# Patient Record
Sex: FEMALE | Race: WHITE | NOT HISPANIC OR LATINO | Employment: OTHER | URBAN - METROPOLITAN AREA
[De-identification: names, ages, dates, MRNs, and addresses within clinical notes are randomized per-mention and may not be internally consistent; named-entity substitution may affect disease eponyms.]

---

## 2019-09-13 ENCOUNTER — HOSPITAL ENCOUNTER (EMERGENCY)
Facility: HOSPITAL | Age: 74
Discharge: HOME | End: 2019-09-13
Attending: EMERGENCY MEDICINE
Payer: COMMERCIAL

## 2019-09-13 VITALS
RESPIRATION RATE: 15 BRPM | HEIGHT: 63 IN | SYSTOLIC BLOOD PRESSURE: 173 MMHG | TEMPERATURE: 98.1 F | HEART RATE: 92 BPM | BODY MASS INDEX: 26.56 KG/M2 | OXYGEN SATURATION: 95 % | DIASTOLIC BLOOD PRESSURE: 83 MMHG | WEIGHT: 149.91 LBS

## 2019-09-13 DIAGNOSIS — M79.604 RIGHT LEG PAIN: ICD-10-CM

## 2019-09-13 DIAGNOSIS — L97.309: ICD-10-CM

## 2019-09-13 DIAGNOSIS — T14.8XXA WOUND INFECTION: ICD-10-CM

## 2019-09-13 DIAGNOSIS — R26.2 AMBULATORY DYSFUNCTION: Primary | ICD-10-CM

## 2019-09-13 DIAGNOSIS — L08.9 WOUND INFECTION: ICD-10-CM

## 2019-09-13 PROCEDURE — 99283 EMERGENCY DEPT VISIT LOW MDM: CPT

## 2019-09-13 PROCEDURE — 99284 EMERGENCY DEPT VISIT MOD MDM: CPT | Performed by: EMERGENCY MEDICINE

## 2019-09-13 RX ORDER — CEPHALEXIN 500 MG/1
500 CAPSULE ORAL EVERY 6 HOURS SCHEDULED
Qty: 28 CAPSULE | Refills: 0 | Status: SHIPPED | OUTPATIENT
Start: 2019-09-13 | End: 2019-09-13 | Stop reason: SDUPTHER

## 2019-09-13 RX ORDER — SULFAMETHOXAZOLE AND TRIMETHOPRIM 800; 160 MG/1; MG/1
1 TABLET ORAL 2 TIMES DAILY
Qty: 14 TABLET | Refills: 0 | Status: SHIPPED | OUTPATIENT
Start: 2019-09-13 | End: 2019-09-20

## 2019-09-13 RX ORDER — TRAMADOL HYDROCHLORIDE 50 MG/1
25 TABLET ORAL EVERY 6 HOURS PRN
Qty: 12 TABLET | Refills: 0 | Status: SHIPPED | OUTPATIENT
Start: 2019-09-13 | End: 2019-09-20

## 2019-09-13 RX ORDER — SULFAMETHOXAZOLE AND TRIMETHOPRIM 800; 160 MG/1; MG/1
1 TABLET ORAL ONCE
Status: COMPLETED | OUTPATIENT
Start: 2019-09-13 | End: 2019-09-13

## 2019-09-13 RX ORDER — TRAMADOL HYDROCHLORIDE 50 MG/1
25 TABLET ORAL EVERY 6 HOURS PRN
Qty: 12 TABLET | Refills: 0 | Status: SHIPPED | OUTPATIENT
Start: 2019-09-13 | End: 2019-09-13 | Stop reason: SDUPTHER

## 2019-09-13 RX ORDER — SULFAMETHOXAZOLE AND TRIMETHOPRIM 800; 160 MG/1; MG/1
1 TABLET ORAL 2 TIMES DAILY
Qty: 14 TABLET | Refills: 0 | Status: SHIPPED | OUTPATIENT
Start: 2019-09-13 | End: 2019-09-13 | Stop reason: SDUPTHER

## 2019-09-13 RX ORDER — CEPHALEXIN 250 MG/1
500 CAPSULE ORAL ONCE
Status: COMPLETED | OUTPATIENT
Start: 2019-09-13 | End: 2019-09-13

## 2019-09-13 RX ORDER — CEPHALEXIN 500 MG/1
500 CAPSULE ORAL EVERY 6 HOURS SCHEDULED
Qty: 28 CAPSULE | Refills: 0 | Status: SHIPPED | OUTPATIENT
Start: 2019-09-13 | End: 2019-09-20

## 2019-09-13 RX ADMIN — CEPHALEXIN 500 MG: 250 CAPSULE ORAL at 11:08

## 2019-09-13 RX ADMIN — SULFAMETHOXAZOLE AND TRIMETHOPRIM 1 TABLET: 800; 160 TABLET ORAL at 11:08

## 2019-09-13 NOTE — ED PROVIDER NOTES
History  Chief Complaint   Patient presents with    Wound Infection     pt c/o wound on her right calf for the past year "on and off"  ulcer present  area is pink and warm to touch     HPI   76-year-old female with history of HLD, polyneuropathy, HTN, osteoporosis and chronic right lower extremity ulcer presents to the ED for evaluation of chronic right lower leg ulcer  Patient states that she had right knee pain last night that she believes was due to the position she was laying in  She told her daughter about pain and was urged to come to the ED for evaluation of knee pain and of chronic ulcer  Ulcer has reportedly been present for over a year  Patient has not noted any recent changes in the ulcer including drainage, increase in erythema, or increase in pain and states that she would not have come to the ED if not for knee pain, which is actually not present on evaluation  Pain in/around the ulcer is also not present on exam, though patient does report that sometimes it throbs at night  She does not recall the last time she was on oral antibiotics for the ulcer  She does apply OTC antibiotic ointment  She has not been seen by a doctor in some time  She has had difficulty walking over the past year, but has been getting around with a walker  Patient offered possible admission today for treatment/further evaluation of ambulatory dysfunction and wound care, which she declines  She states she feels well  ROS negative for fevers, chills, chest pain, shortness of breath, abdominal pain, nausea, vomiting, weakness, numbness, paresthesias, or complaints other than stated above  None       Past Medical History:   Diagnosis Date    Osteoporosis     Shingles        Past Surgical History:   Procedure Laterality Date    CATARACT EXTRACTION Bilateral     FOOT SURGERY Right     reconstruction    TOTAL HIP ARTHROPLASTY Bilateral        History reviewed  No pertinent family history    I have reviewed and agree with the history as documented  Social History     Tobacco Use    Smoking status: Former Smoker    Smokeless tobacco: Never Used    Tobacco comment: quit 50 years ago   Substance Use Topics    Alcohol use: Yes     Alcohol/week: 7 0 standard drinks     Types: 7 Glasses of wine per week    Drug use: Never        Review of Systems   Musculoskeletal: Positive for gait problem  Skin: Positive for wound  All other systems reviewed and are negative  Physical Exam  Physical Exam   Constitutional: She is oriented to person, place, and time  She appears well-nourished  No distress  HENT:   Head: Normocephalic and atraumatic  Eyes: EOM are normal    Neck: Normal range of motion  Neck supple  Cardiovascular: Normal rate and regular rhythm  Pulmonary/Chest: Effort normal and breath sounds normal  No respiratory distress  Abdominal: Soft  She exhibits no distension  There is no tenderness  Musculoskeletal: Normal range of motion  She exhibits deformity (chronic deformities of hands, left knee)  Neurological: She is alert and oriented to person, place, and time  Skin: Skin is warm and dry  She is not diaphoretic  Psychiatric: She has a normal mood and affect  Her behavior is normal    Nursing note and vitals reviewed            Vital Signs  ED Triage Vitals [09/13/19 1036]   Temperature Pulse Respirations Blood Pressure SpO2   98 1 °F (36 7 °C) 83 16 168/97 97 %      Temp Source Heart Rate Source Patient Position - Orthostatic VS BP Location FiO2 (%)   Oral Monitor Sitting Right arm --      Pain Score       No Pain           Vitals:    09/13/19 1036 09/13/19 1240   BP: 168/97 (!) 173/83   Pulse: 83 92   Patient Position - Orthostatic VS: Sitting Sitting         Visual Acuity      ED Medications  Medications   cephalexin (KEFLEX) capsule 500 mg (500 mg Oral Given 9/13/19 1108)   sulfamethoxazole-trimethoprim (BACTRIM DS) 800-160 mg per tablet 1 tablet (1 tablet Oral Given 9/13/19 1108) Diagnostic Studies  Results Reviewed     None                 No orders to display              Procedures  Procedures       ED Course           Identification of Seniors at Risk      Most Recent Value   (ISAR) Identification of Seniors at Risk   Before the illness or injury that brought you to the Emergency, did you need someone to help you on a regular basis? 1 Filed at: 09/13/2019 1041   In the last 24 hours, have you needed more help than usual?  0 Filed at: 09/13/2019 1041   Have you been hospitalized for one or more nights during the past 6 months? 0 Filed at: 09/13/2019 1041   In general, do you see well?  0 Filed at: 09/13/2019 1041   In general, do you have serious problems with your memory? 0 Filed at: 09/13/2019 1041   Do you take more than three different medications every day?  0 Filed at: 09/13/2019 1041   ISAR Score  1 Filed at: 09/13/2019 1041                          MDM    Disposition  Final diagnoses:   Ambulatory dysfunction   Right leg pain   Chronic ulcer of ankle ([facility] [address] )   Wound infection     Time reflects when diagnosis was documented in both MDM as applicable and the Disposition within this note     Time User Action Codes Description Comment    9/13/2019 10:55 AM Alisson Muñoz [R26 2] Ambulatory dysfunction     9/13/2019 11:37 AM Alisson Muñoz [M79 604] Right leg pain     9/13/2019 11:37 AM Veena Mesa [I72 038] Chronic ulcer of ankle ([facility] [address] )     9/13/2019 11:37 AM Veena Chow Add [T14  8XXA,  L08 9] Wound infection       ED Disposition     ED Disposition Condition Date/Time Comment    Discharge Stable Fri Sep 13, 2019 10:53 AM Gege Craft discharge to home/self care              Follow-up Information     Follow up With Specialties Details Why Contact Info Additional Information    [address] [facility] Emergency Department Emergency Medicine   [address] [facility] [address] [phone] [place] ED, [address] [facility], [place], [place], [place], [place] Family Medicine Go on 9/24/2019 YOU HAVE A FOLLOW UP APPOINTMENT ON 2PM  please bring insurane card(s)  [address]       Swapnil Redmond DPM Podiatry  YOU HAVE A FOLLOW UP APPOINTMENT AT 2 PM   please bring insurance card(s)   [address]  [phone]       [facility] [address] [place] [address] Vascular Surgery Schedule an appointment as soon as possible for a visit   [address]  [facility] [address] [facility]  [phone] The [address] [place] [address], [address] [place], [place], [address]          Discharge Medication List as of 9/13/2019 11:57 AM      START taking these medications    Details   cephalexin (KEFLEX) 500 mg capsule Take 1 capsule (500 mg total) by mouth every 6 (six) hours for 7 days, Starting Fri 9/13/2019, Until Fri 9/20/2019, Print      sulfamethoxazole-trimethoprim (BACTRIM DS) 800-160 mg per tablet Take 1 tablet by mouth 2 (two) times a day for 7 days smx-tmp DS (BACTRIM) 800-160 mg tabs (1tab q12 D10), Starting Fri 9/13/2019, Until Fri 9/20/2019, Print      traMADol (ULTRAM) 50 mg tablet Take 0 5 tablets (25 mg total) by mouth every 6 (six) hours as needed for moderate pain for up to 7 days, Starting Fri 9/13/2019, Until Fri 9/20/2019, Print               ED Provider  Electronically Signed by           Kaia Serna MD  09/13/19 7456

## 2019-09-13 NOTE — SOCIAL WORK
Cm consulted to meet with patient regarding home services and follow up appointments  Cm met with patient at bedside, patient alert and oriented and accompanied by her granddaughter [place]  Patient reports residing with her granddaughter [place] in a private mobile home with her 3 grandchildren and son-in-law  Patient reports using a wheelchair and self propels, patient reports having assistance with ADL's, her granddaughter Stanley Terry is home with her and the children  Patient reports being interested in seeing a provider within [address], pcp appointment made with juan Hope updated  Patient reports having a hx with [facility], referral made, patient accepted for home services  Patient reports filling her prescriptions at [facility] with no co-payment barriers, denies any MH/SA  Patient reports having complicated surgeries in the past that caused issues with her ambulating denies any str placement  Patient requested a referral for waiver services, phone referral made, patient requesting referral for meals on wheels and shared ride, referral made, referral made for vna with [address] is aware of patient appointments,with new pcp and podiatry, [facility] aware of patient discharge this day  CM reviewed discharge planning process including the following: identifying help at home, patient preference for discharge planning needs, pharmacy preference, and availability of treatment team to discuss questions or concerns patient and/or family may have regarding understanding medications and recognizing signs and symptoms once discharged  CM also encouraged patient to follow up with all recommended appointments after discharge  Patient advised of importance for patient and family to participate in managing patients medical well being

## 2019-10-24 ENCOUNTER — OFFICE VISIT (OUTPATIENT)
Dept: FAMILY MEDICINE CLINIC | Facility: CLINIC | Age: 74
End: 2019-10-24
Payer: COMMERCIAL

## 2019-10-24 VITALS
OXYGEN SATURATION: 96 % | TEMPERATURE: 99.1 F | HEART RATE: 86 BPM | WEIGHT: 146.6 LBS | BODY MASS INDEX: 25.98 KG/M2 | DIASTOLIC BLOOD PRESSURE: 84 MMHG | SYSTOLIC BLOOD PRESSURE: 142 MMHG | HEIGHT: 63 IN

## 2019-10-24 DIAGNOSIS — L03.119 CELLULITIS AND ABSCESS OF FOOT: ICD-10-CM

## 2019-10-24 DIAGNOSIS — G47.00 INSOMNIA, UNSPECIFIED TYPE: Primary | ICD-10-CM

## 2019-10-24 DIAGNOSIS — G89.29 OTHER CHRONIC PAIN: ICD-10-CM

## 2019-10-24 DIAGNOSIS — N39.41 URGE INCONTINENCE OF URINE: ICD-10-CM

## 2019-10-24 DIAGNOSIS — F41.9 ANXIETY: ICD-10-CM

## 2019-10-24 DIAGNOSIS — R53.83 FATIGUE, UNSPECIFIED TYPE: ICD-10-CM

## 2019-10-24 DIAGNOSIS — L02.619 CELLULITIS AND ABSCESS OF FOOT: ICD-10-CM

## 2019-10-24 DIAGNOSIS — I73.9 PERIPHERAL VASCULAR DISEASE OF EXTREMITY (HCC): ICD-10-CM

## 2019-10-24 DIAGNOSIS — Z00.00 HEALTHCARE MAINTENANCE: ICD-10-CM

## 2019-10-24 DIAGNOSIS — Z12.11 SCREENING FOR COLON CANCER: ICD-10-CM

## 2019-10-24 DIAGNOSIS — L03.90 WOUND CELLULITIS: ICD-10-CM

## 2019-10-24 PROCEDURE — 99204 OFFICE O/P NEW MOD 45 MIN: CPT | Performed by: NURSE PRACTITIONER

## 2019-10-24 PROCEDURE — 1101F PT FALLS ASSESS-DOCD LE1/YR: CPT | Performed by: NURSE PRACTITIONER

## 2019-10-24 RX ORDER — BROMPHENIRAMIN/PSEUDOEPHEDRINE 1-15MG/5ML
LIQUID (ML) ORAL 3 TIMES DAILY
Qty: 30 EACH | Refills: 3 | Status: SHIPPED | OUTPATIENT
Start: 2019-10-24 | End: 2021-11-30

## 2019-10-24 RX ORDER — TRAMADOL HYDROCHLORIDE 100 MG/1
100 CAPSULE ORAL DAILY
Qty: 30 CAPSULE | Refills: 0 | Status: ON HOLD | OUTPATIENT
Start: 2019-10-24 | End: 2022-03-04 | Stop reason: CLARIF

## 2019-10-24 RX ORDER — TRAZODONE HYDROCHLORIDE 50 MG/1
50 TABLET ORAL
Qty: 30 TABLET | Refills: 5 | Status: SHIPPED | OUTPATIENT
Start: 2019-10-24 | End: 2020-05-20 | Stop reason: SDUPTHER

## 2019-10-24 RX ORDER — BROMPHENIRAMIN/PSEUDOEPHEDRINE 1-15MG/5ML
LIQUID (ML) ORAL 3 TIMES DAILY
Qty: 30 EACH | Refills: 3 | Status: SHIPPED | OUTPATIENT
Start: 2019-10-24 | End: 2019-10-24 | Stop reason: SDUPTHER

## 2019-10-24 RX ORDER — GABAPENTIN 100 MG/1
100 CAPSULE ORAL 3 TIMES DAILY
Qty: 90 CAPSULE | Refills: 3 | Status: SHIPPED | OUTPATIENT
Start: 2019-10-24 | End: 2020-05-20 | Stop reason: SDUPTHER

## 2019-10-24 NOTE — PATIENT INSTRUCTIONS
Tramadol at night  Gabapentin 3 times  Day for pain  Use heat , Maintain nutrition  Obtain fasting labs, nothing to eat after midnight, may drink water  Heart Healthy Diet   WHAT YOU NEED TO KNOW:   A heart healthy diet is an eating plan low in total fat, unhealthy fats, and sodium (salt)  A heart healthy diet helps decrease your risk for heart disease and stroke  Limit the amount of fat you eat to 25% to 35% of your total daily calories  Limit sodium to less than 2,300 mg each day  DISCHARGE INSTRUCTIONS:   Healthy fats:  Healthy fats can help improve cholesterol levels  The risk for heart disease is decreased when cholesterol levels are normal  Choose healthy fats, such as the following:  · Unsaturated fat  is found in foods such as soybean, canola, olive, corn, and safflower oils  It is also found in soft tub margarine that is made with liquid vegetable oil  · Omega-3 fat  is found in certain fish, such as salmon, tuna, and trout, and in walnuts and flaxseed  Unhealthy fats:  Unhealthy fats can cause unhealthy cholesterol levels in your blood and increase your risk of heart disease  Limit unhealthy fats, such as the following:  · Cholesterol  is found in animal foods, such as eggs and lobster, and in dairy products made from whole milk  Limit cholesterol to less than 300 milligrams (mg) each day  You may need to limit cholesterol to 200 mg each day if you have heart disease  · Saturated fat  is found in meats, such as nichols and hamburger  It is also found in chicken or turkey skin, whole milk, and butter  Limit saturated fat to less than 7% of your total daily calories  Limit saturated fat to less than 6% if you have heart disease or are at increased risk for it  · Trans fat  is found in packaged foods, such as potato chips and cookies  It is also in hard margarine, some fried foods, and shortening  Avoid trans fats as much as possible    Heart healthy foods and drinks to include:  Ask your dietitian or healthcare provider how many servings to have from each of the following food groups:  · Grains:      ¨ Whole-wheat breads, cereals, and pastas, and brown rice    ¨ Low-fat, low-sodium crackers and chips    · Vegetables:      ¨ Broccoli, green beans, green peas, and spinach    ¨ Collards, kale, and lima beans    ¨ Carrots, sweet potatoes, tomatoes, and peppers    ¨ Canned vegetables with no salt added    · Fruits:      ¨ Bananas, peaches, pears, and pineapple    ¨ Grapes, raisins, and dates    ¨ Oranges, tangerines, grapefruit, orange juice, and grapefruit juice    ¨ Apricots, mangoes, melons, and papaya    ¨ Raspberries and strawberries    ¨ Canned fruit with no added sugar    · Low-fat dairy products:      ¨ Nonfat (skim) milk, 1% milk, and low-fat almond, cashew, or soy milks fortified with calcium    ¨ Low-fat cheese, regular or frozen yogurt, and cottage cheese    · Meats and proteins , such as lean cuts of beef and pork (loin, leg, round), skinless chicken and turkey, legumes, soy products, egg whites, and nuts  Foods and drinks to limit or avoid:  Ask your dietitian or healthcare provider about these and other foods that are high in unhealthy fat, sodium, and sugar:  · Snack or packaged foods , such as frozen dinners, cookies, macaroni and cheese, and cereals with more than 300 mg of sodium per serving    · Canned or dry mixes  for cakes, soups, sauces, or gravies    · Vegetables with added sodium , such as instant potatoes, vegetables with added sauces, or regular canned vegetables    · Other foods high in sodium , such as ketchup, barbecue sauce, salad dressing, pickles, olives, soy sauce, and miso    · High-fat dairy foods  such as whole or 2% milk, cream cheese, or sour cream, and cheeses     · High-fat protein foods  such as high-fat cuts of beef (T-bone steaks, ribs), chicken or turkey with skin, and organ meats, such as liver    · Cured or smoked meats , such as hot dogs, nichols, and sausage    · Unhealthy fats and oils , such as butter, stick margarine, shortening, and cooking oils such as coconut or palm oil    · Food and drinks high in sugar , such as soft drinks (soda), sports drinks, sweetened tea, candy, cake, cookies, pies, and doughnuts  Other diet guidelines to follow:   · Eat more foods containing omega-3 fats  Eat fish high in omega-3 fats at least 2 times a week  · Limit alcohol  Too much alcohol can damage your heart and raise your blood pressure  Women should limit alcohol to 1 drink a day  Men should limit alcohol to 2 drinks a day  A drink of alcohol is 12 ounces of beer, 5 ounces of wine, or 1½ ounces of liquor  · Choose low-sodium foods  High-sodium foods can lead to high blood pressure  Add little or no salt to food you prepare  Use herbs and spices in place of salt  · Eat more fiber  to help lower cholesterol levels  Eat at least 5 servings of fruits and vegetables each day  Eat 3 ounces of whole-grain foods each day  Legumes (beans) are also a good source of fiber  Lifestyle guidelines:   · Do not smoke  Nicotine and other chemicals in cigarettes and cigars can cause lung and heart damage  Ask your healthcare provider for information if you currently smoke and need help to quit  E-cigarettes or smokeless tobacco still contain nicotine  Talk to your healthcare provider before you use these products  · Exercise regularly  to help you maintain a healthy weight and improve your blood pressure and cholesterol levels  Ask your healthcare provider about the best exercise plan for you  Do not start an exercise program without asking your healthcare provider  Follow up with your healthcare provider as directed:  Write down your questions so you remember to ask them during your visits  © 2017 [address] Information is for End User's use only and may not be sold, redistributed or otherwise used for commercial purposes   All illustrations and images included in CareNotes® are the copyrighted property of A D A M , Inc  or Frankie Hernández  The above information is an  only  It is not intended as medical advice for individual conditions or treatments  Talk to your doctor, nurse or pharmacist before following any medical regimen to see if it is safe and effective for you

## 2019-10-24 NOTE — PROGRESS NOTES
Assessment/Plan:  BMI Counseling: Body mass index is 25 97 kg/m²  The BMI is above normal  Nutrition recommendations include decreasing portion sizes, encouraging healthy choices of fruits and vegetables, decreasing fast food intake, consuming healthier snacks, limiting drinks that contain sugar, moderation in carbohydrate intake, increasing intake of lean protein, reducing intake of saturated and trans fat and reducing intake of cholesterol  Exercise recommendations include exercising 3-5 times per week  Encounter to establish care  Intake completed  Labs ordered  Referral made to vascular surgery and Wound Care  Stressed the importance of managing wound so it does not deteriorate  Foot pain  Patient has more than right leg  Reports that it hurts a lot  Gabapentin ordered to help with that and also help with post herpetic neurolagia  Tramadol at night    Post-herpetic polyneuropathy  Gabapentin ordered, education provided           Problem List Items Addressed This Visit     None      Visit Diagnoses     Insomnia, unspecified type    -  Primary    Relevant Medications    traZODone (DESYREL) 50 mg tablet    Wound cellulitis        Relevant Medications    collagenase (SANTYL) ointment    Peripheral vascular disease of extremity (HCC)        Relevant Orders    Ambulatory referral to Vascular Surgery    Other chronic pain        Relevant Medications    traMADol HCl  MG CP24    gabapentin (NEURONTIN) 100 mg capsule    Urge incontinence of urine        Relevant Medications    Incontinence Supply Disposable (BRIEFS OVERNIGHT MEDIUM) MISC    Screening for colon cancer        Relevant Orders    Occult Blood, Fecal Immunochemical    Cellulitis and abscess of foot        Relevant Orders    Ambulatory referral to [address] [place] [address]        Relevant Orders    CBC and differential    Comprehensive metabolic panel    Lipid panel    TSH, 3rd generation with Free T4 reflex    Vitamin D Panel Anxiety        Relevant Orders    TSH, 3rd generation with Free T4 reflex    Vitamin D Panel    Fatigue, unspecified type        Relevant Orders    CBC and differential    TSH, 3rd generation with Free T4 reflex    Vitamin D Panel            Subjective:      Patient ID: Earle Ortega is a 75 y o  female  Patient is here to establish care  Last primary care provider- Dr Alycia Calzada  Past medical history-hypertension, hyperlipidemia   Past surgical history-  Social history- Lives with grand dtr and 5 kids    Kids-  Employment-retired  Smoker- Non  Alcohol- Wine  Other drugs-  Last diagnostic labs screening-current  Dental exam-   Eyes- 2014  Mammogram-2012  Cervical cancer screening- Not indicated   Colonoscopy-No  Current concerns-wound on right leg          The following portions of the patient's history were reviewed and updated as appropriate: allergies, current medications, past family history, past medical history, past social history, past surgical history and problem list     Review of Systems   Constitutional: Negative  HENT: Negative  Eyes: Negative  Respiratory: Negative  Cardiovascular: Negative  Gastrointestinal: Negative  Endocrine: Negative  Genitourinary: Negative  Musculoskeletal: Negative  Skin: Positive for wound (Right leg)  Allergic/Immunologic: Negative  Neurological: Negative  Psychiatric/Behavioral: Negative  Objective:      /84   Pulse 86   Temp 99 1 °F (37 3 °C) (Tympanic)   Ht 5' 3" (1 6 m)   Wt 66 5 kg (146 lb 9 6 oz)   SpO2 96%   BMI 25 97 kg/m²          Physical Exam   Constitutional: She is oriented to person, place, and time  She appears well-developed and well-nourished  HENT:   Head: Normocephalic and atraumatic  Right Ear: External ear normal    Left Ear: External ear normal    Eyes: Pupils are equal, round, and reactive to light  Neck: Normal range of motion  Cardiovascular: Normal rate and regular rhythm  Pulmonary/Chest: Effort normal    Abdominal: Soft  Bowel sounds are normal    Musculoskeletal: Normal range of motion  Neurological: She is alert and oriented to person, place, and time  Skin: Skin is warm and dry  Wound on right leg  Psychiatric: She has a normal mood and affect  Her behavior is normal  Thought content normal    Nursing note and vitals reviewed          Labs:    No results found for: WBC, HGB, HCT, MCV, PLT  No results found for: NA, K, CL, CO2, ANIONGAP, BUN, CREATININE, GLUCOSE, GLUF, CALCIUM, CORRECTEDCA, AST, ALT, ALKPHOS, PROT, BILITOT, EGFR  No results found for: GLUCOSE, CALCIUM, NA, K, CO2, CL, BUN, CREATININE

## 2019-10-25 PROBLEM — M20.40 HAMMER TOE: Status: ACTIVE | Noted: 2019-10-25

## 2019-10-25 PROBLEM — B02.23 POST-HERPETIC POLYNEUROPATHY: Status: ACTIVE | Noted: 2017-10-31

## 2019-10-25 PROBLEM — R73.01 IFG (IMPAIRED FASTING GLUCOSE): Status: ACTIVE | Noted: 2017-11-03

## 2019-10-25 PROBLEM — M81.0 SENILE OSTEOPOROSIS: Status: ACTIVE | Noted: 2017-10-31

## 2019-10-25 PROBLEM — M79.673 FOOT PAIN: Status: ACTIVE | Noted: 2019-10-25

## 2019-10-25 PROBLEM — Z76.89 ENCOUNTER TO ESTABLISH CARE: Status: ACTIVE | Noted: 2019-10-25

## 2019-11-14 ENCOUNTER — TRANSCRIBE ORDERS (OUTPATIENT)
Dept: LAB | Facility: HOSPITAL | Age: 74
End: 2019-11-14

## 2019-11-14 DIAGNOSIS — Z12.11 SCREENING FOR COLON CANCER: Primary | ICD-10-CM

## 2019-12-23 ENCOUNTER — TELEPHONE (OUTPATIENT)
Dept: FAMILY MEDICINE CLINIC | Facility: CLINIC | Age: 74
End: 2019-12-23

## 2019-12-23 NOTE — TELEPHONE ENCOUNTER
Called to see when they would get something back  They sent over a wound supply and its pending since November

## 2019-12-26 ENCOUNTER — TELEPHONE (OUTPATIENT)
Dept: FAMILY MEDICINE CLINIC | Facility: CLINIC | Age: 74
End: 2019-12-26

## 2019-12-26 NOTE — TELEPHONE ENCOUNTER
Spoke with patient regarding woundcare supplies   Request was sent out in November, have not received any new request  Advised patient to have the wound care nurse call tomorrow to discuss

## 2019-12-31 ENCOUNTER — TELEPHONE (OUTPATIENT)
Dept: FAMILY MEDICINE CLINIC | Facility: CLINIC | Age: 74
End: 2019-12-31

## 2019-12-31 NOTE — TELEPHONE ENCOUNTER
He called asking if we got a fax they sent us for the patients wound supplies  I did not see anything in your folder

## 2019-12-31 NOTE — TELEPHONE ENCOUNTER
Did not receive anything, Her wound care nurse was supposed to call on Fri, did not receive any calls

## 2020-01-30 ENCOUNTER — TELEPHONE (OUTPATIENT)
Dept: FAMILY MEDICINE CLINIC | Facility: CLINIC | Age: 75
End: 2020-01-30

## 2020-04-13 ENCOUNTER — TELEPHONE (OUTPATIENT)
Dept: OTHER | Facility: OTHER | Age: 75
End: 2020-04-13

## 2020-04-22 ENCOUNTER — TELEPHONE (OUTPATIENT)
Dept: LAB | Facility: HOSPITAL | Age: 75
End: 2020-04-22

## 2020-04-22 ENCOUNTER — TELEMEDICINE (OUTPATIENT)
Dept: FAMILY MEDICINE CLINIC | Facility: CLINIC | Age: 75
End: 2020-04-22
Payer: COMMERCIAL

## 2020-04-22 VITALS — HEART RATE: 78 BPM | SYSTOLIC BLOOD PRESSURE: 111 MMHG | TEMPERATURE: 98 F | DIASTOLIC BLOOD PRESSURE: 60 MMHG

## 2020-04-22 DIAGNOSIS — L03.119 CELLULITIS AND ABSCESS OF LEG: Primary | ICD-10-CM

## 2020-04-22 DIAGNOSIS — L02.419 CELLULITIS AND ABSCESS OF LEG: Primary | ICD-10-CM

## 2020-04-22 DIAGNOSIS — I10 ESSENTIAL HYPERTENSION: ICD-10-CM

## 2020-04-22 DIAGNOSIS — B99.9 INFECTION: ICD-10-CM

## 2020-04-22 DIAGNOSIS — R73.01 IFG (IMPAIRED FASTING GLUCOSE): ICD-10-CM

## 2020-04-22 PROBLEM — B02.23 POST-HERPETIC POLYNEUROPATHY: Status: RESOLVED | Noted: 2017-10-31 | Resolved: 2020-04-22

## 2020-04-22 PROCEDURE — 99214 OFFICE O/P EST MOD 30 MIN: CPT | Performed by: FAMILY MEDICINE

## 2020-04-22 RX ORDER — CEPHALEXIN 500 MG/1
CAPSULE ORAL
Qty: 40 CAPSULE | Refills: 1 | Status: SHIPPED | OUTPATIENT
Start: 2020-04-22 | End: 2020-05-02

## 2020-04-23 ENCOUNTER — TELEPHONE (OUTPATIENT)
Dept: LAB | Facility: HOSPITAL | Age: 75
End: 2020-04-23

## 2020-04-24 ENCOUNTER — TRANSCRIBE ORDERS (OUTPATIENT)
Dept: LAB | Facility: HOSPITAL | Age: 75
End: 2020-04-24

## 2020-04-24 DIAGNOSIS — R73.01 IMPAIRED FASTING GLUCOSE: Primary | ICD-10-CM

## 2020-04-29 ENCOUNTER — TELEMEDICINE (OUTPATIENT)
Dept: FAMILY MEDICINE CLINIC | Facility: CLINIC | Age: 75
End: 2020-04-29
Payer: COMMERCIAL

## 2020-04-29 DIAGNOSIS — E78.2 COMBINED HYPERLIPIDEMIA: ICD-10-CM

## 2020-04-29 DIAGNOSIS — I10 ESSENTIAL (PRIMARY) HYPERTENSION: ICD-10-CM

## 2020-04-29 DIAGNOSIS — L03.115 CELLULITIS OF RIGHT LOWER EXTREMITY: Primary | ICD-10-CM

## 2020-04-29 PROCEDURE — 99213 OFFICE O/P EST LOW 20 MIN: CPT | Performed by: FAMILY MEDICINE

## 2020-04-29 PROCEDURE — 1160F RVW MEDS BY RX/DR IN RCRD: CPT | Performed by: FAMILY MEDICINE

## 2020-04-30 ENCOUNTER — APPOINTMENT (OUTPATIENT)
Dept: LAB | Facility: HOSPITAL | Age: 75
End: 2020-04-30
Payer: COMMERCIAL

## 2020-04-30 DIAGNOSIS — F41.9 ANXIETY: ICD-10-CM

## 2020-04-30 DIAGNOSIS — R73.01 IFG (IMPAIRED FASTING GLUCOSE): ICD-10-CM

## 2020-04-30 DIAGNOSIS — Z00.00 HEALTHCARE MAINTENANCE: ICD-10-CM

## 2020-04-30 DIAGNOSIS — R53.83 FATIGUE, UNSPECIFIED TYPE: ICD-10-CM

## 2020-04-30 DIAGNOSIS — R73.01 IMPAIRED FASTING GLUCOSE: ICD-10-CM

## 2020-04-30 LAB
ALBUMIN SERPL BCP-MCNC: 3.3 G/DL (ref 3.5–5)
ALP SERPL-CCNC: 121 U/L (ref 46–116)
ALT SERPL W P-5'-P-CCNC: 18 U/L (ref 12–78)
ANION GAP SERPL CALCULATED.3IONS-SCNC: 11 MMOL/L (ref 4–13)
AST SERPL W P-5'-P-CCNC: 18 U/L (ref 5–45)
BASOPHILS # BLD AUTO: 0.06 THOUSANDS/ΜL (ref 0–0.1)
BASOPHILS NFR BLD AUTO: 1 % (ref 0–1)
BILIRUB SERPL-MCNC: 0.4 MG/DL (ref 0.2–1)
BUN SERPL-MCNC: 13 MG/DL (ref 5–25)
CALCIUM SERPL-MCNC: 8.4 MG/DL (ref 8.3–10.1)
CHLORIDE SERPL-SCNC: 107 MMOL/L (ref 100–108)
CHOLEST SERPL-MCNC: 171 MG/DL (ref 50–200)
CO2 SERPL-SCNC: 24 MMOL/L (ref 21–32)
CREAT SERPL-MCNC: 0.59 MG/DL (ref 0.6–1.3)
CREAT UR-MCNC: 36.8 MG/DL
EOSINOPHIL # BLD AUTO: 0.7 THOUSAND/ΜL (ref 0–0.61)
EOSINOPHIL NFR BLD AUTO: 9 % (ref 0–6)
ERYTHROCYTE [DISTWIDTH] IN BLOOD BY AUTOMATED COUNT: 18.6 % (ref 11.6–15.1)
EST. AVERAGE GLUCOSE BLD GHB EST-MCNC: 105 MG/DL
GFR SERPL CREATININE-BSD FRML MDRD: 90 ML/MIN/1.73SQ M
GLUCOSE SERPL-MCNC: 66 MG/DL (ref 65–140)
HBA1C MFR BLD: 5.3 %
HCT VFR BLD AUTO: 45.7 % (ref 34.8–46.1)
HDLC SERPL-MCNC: 37 MG/DL
HGB BLD-MCNC: 13.5 G/DL (ref 11.5–15.4)
IMM GRANULOCYTES # BLD AUTO: 0.13 THOUSAND/UL (ref 0–0.2)
IMM GRANULOCYTES NFR BLD AUTO: 2 % (ref 0–2)
LDLC SERPL CALC-MCNC: 106 MG/DL (ref 0–100)
LYMPHOCYTES # BLD AUTO: 1 THOUSANDS/ΜL (ref 0.6–4.47)
LYMPHOCYTES NFR BLD AUTO: 13 % (ref 14–44)
MCH RBC QN AUTO: 28.1 PG (ref 26.8–34.3)
MCHC RBC AUTO-ENTMCNC: 29.5 G/DL (ref 31.4–37.4)
MCV RBC AUTO: 95 FL (ref 82–98)
MICROALBUMIN UR-MCNC: <5 MG/L (ref 0–20)
MICROALBUMIN/CREAT 24H UR: <14 MG/G CREATININE (ref 0–30)
MONOCYTES # BLD AUTO: 0.66 THOUSAND/ΜL (ref 0.17–1.22)
MONOCYTES NFR BLD AUTO: 8 % (ref 4–12)
NEUTROPHILS # BLD AUTO: 5.43 THOUSANDS/ΜL (ref 1.85–7.62)
NEUTS SEG NFR BLD AUTO: 67 % (ref 43–75)
NONHDLC SERPL-MCNC: 134 MG/DL
NRBC BLD AUTO-RTO: 0 /100 WBCS
PLATELET # BLD AUTO: 683 THOUSANDS/UL (ref 149–390)
PMV BLD AUTO: 11 FL (ref 8.9–12.7)
POTASSIUM SERPL-SCNC: 4.2 MMOL/L (ref 3.5–5.3)
PROT SERPL-MCNC: 7.5 G/DL (ref 6.4–8.2)
RBC # BLD AUTO: 4.8 MILLION/UL (ref 3.81–5.12)
SODIUM SERPL-SCNC: 142 MMOL/L (ref 136–145)
TRIGL SERPL-MCNC: 139 MG/DL
TSH SERPL DL<=0.05 MIU/L-ACNC: 1.71 UIU/ML (ref 0.36–3.74)
VENIPUNCTURE: NORMAL
WBC # BLD AUTO: 7.98 THOUSAND/UL (ref 4.31–10.16)

## 2020-04-30 PROCEDURE — 82043 UR ALBUMIN QUANTITATIVE: CPT | Performed by: FAMILY MEDICINE

## 2020-04-30 PROCEDURE — 82570 ASSAY OF URINE CREATININE: CPT | Performed by: FAMILY MEDICINE

## 2020-04-30 PROCEDURE — 36415 COLL VENOUS BLD VENIPUNCTURE: CPT

## 2020-04-30 PROCEDURE — 82306 VITAMIN D 25 HYDROXY: CPT

## 2020-04-30 PROCEDURE — 83036 HEMOGLOBIN GLYCOSYLATED A1C: CPT

## 2020-04-30 PROCEDURE — 80053 COMPREHEN METABOLIC PANEL: CPT

## 2020-04-30 PROCEDURE — 85025 COMPLETE CBC W/AUTO DIFF WBC: CPT

## 2020-04-30 PROCEDURE — 80061 LIPID PANEL: CPT

## 2020-04-30 PROCEDURE — 84443 ASSAY THYROID STIM HORMONE: CPT

## 2020-05-01 ENCOUNTER — TELEPHONE (OUTPATIENT)
Dept: FAMILY MEDICINE CLINIC | Facility: CLINIC | Age: 75
End: 2020-05-01

## 2020-05-04 LAB
25(OH)D2 SERPL-MCNC: 1.4 NG/ML
25(OH)D3 SERPL-MCNC: 11 NG/ML
25(OH)D3+25(OH)D2 SERPL-MCNC: 12 NG/ML

## 2020-05-05 DIAGNOSIS — E55.9 HYPOVITAMINOSIS D: Primary | ICD-10-CM

## 2020-05-05 RX ORDER — ERGOCALCIFEROL 1.25 MG/1
50000 CAPSULE ORAL 2 TIMES WEEKLY
Qty: 16 CAPSULE | Refills: 0 | Status: SHIPPED | OUTPATIENT
Start: 2020-05-07 | End: 2022-03-04 | Stop reason: HOSPADM

## 2020-05-18 ENCOUNTER — TELEPHONE (OUTPATIENT)
Dept: FAMILY MEDICINE CLINIC | Facility: CLINIC | Age: 75
End: 2020-05-18

## 2020-05-20 ENCOUNTER — TELEMEDICINE (OUTPATIENT)
Dept: FAMILY MEDICINE CLINIC | Facility: CLINIC | Age: 75
End: 2020-05-20
Payer: COMMERCIAL

## 2020-05-20 VITALS
HEART RATE: 75 BPM | DIASTOLIC BLOOD PRESSURE: 80 MMHG | SYSTOLIC BLOOD PRESSURE: 160 MMHG | TEMPERATURE: 97.5 F | WEIGHT: 135 LBS | HEIGHT: 63 IN | BODY MASS INDEX: 23.92 KG/M2

## 2020-05-20 DIAGNOSIS — B99.9 INFECTION: ICD-10-CM

## 2020-05-20 DIAGNOSIS — G89.29 OTHER CHRONIC PAIN: ICD-10-CM

## 2020-05-20 DIAGNOSIS — Z00.00 MEDICARE ANNUAL WELLNESS VISIT, SUBSEQUENT: Primary | ICD-10-CM

## 2020-05-20 DIAGNOSIS — G47.00 INSOMNIA, UNSPECIFIED TYPE: ICD-10-CM

## 2020-05-20 PROCEDURE — 4040F PNEUMOC VAC/ADMIN/RCVD: CPT | Performed by: FAMILY MEDICINE

## 2020-05-20 PROCEDURE — 3079F DIAST BP 80-89 MM HG: CPT | Performed by: FAMILY MEDICINE

## 2020-05-20 PROCEDURE — 1125F AMNT PAIN NOTED PAIN PRSNT: CPT | Performed by: FAMILY MEDICINE

## 2020-05-20 PROCEDURE — 1160F RVW MEDS BY RX/DR IN RCRD: CPT | Performed by: FAMILY MEDICINE

## 2020-05-20 PROCEDURE — 3077F SYST BP >= 140 MM HG: CPT | Performed by: FAMILY MEDICINE

## 2020-05-20 PROCEDURE — 1036F TOBACCO NON-USER: CPT | Performed by: FAMILY MEDICINE

## 2020-05-20 PROCEDURE — G0439 PPPS, SUBSEQ VISIT: HCPCS | Performed by: FAMILY MEDICINE

## 2020-05-20 PROCEDURE — 1101F PT FALLS ASSESS-DOCD LE1/YR: CPT | Performed by: FAMILY MEDICINE

## 2020-05-20 PROCEDURE — 1170F FXNL STATUS ASSESSED: CPT | Performed by: FAMILY MEDICINE

## 2020-05-20 PROCEDURE — 3288F FALL RISK ASSESSMENT DOCD: CPT | Performed by: FAMILY MEDICINE

## 2020-05-20 RX ORDER — GABAPENTIN 300 MG/1
300 CAPSULE ORAL 3 TIMES DAILY
Qty: 270 CAPSULE | Refills: 1 | Status: SHIPPED | OUTPATIENT
Start: 2020-05-20 | End: 2020-10-13 | Stop reason: SDUPTHER

## 2020-05-20 RX ORDER — CEPHALEXIN 500 MG/1
CAPSULE ORAL
Qty: 20 CAPSULE | Refills: 0 | Status: SHIPPED | OUTPATIENT
Start: 2020-05-20 | End: 2020-05-24

## 2020-05-20 RX ORDER — TRAZODONE HYDROCHLORIDE 100 MG/1
100 TABLET ORAL
Qty: 90 TABLET | Refills: 1 | Status: ON HOLD | OUTPATIENT
Start: 2020-05-20 | End: 2022-03-04 | Stop reason: CLARIF

## 2020-06-19 ENCOUNTER — TELEPHONE (OUTPATIENT)
Dept: FAMILY MEDICINE CLINIC | Facility: CLINIC | Age: 75
End: 2020-06-19

## 2020-06-19 NOTE — TELEPHONE ENCOUNTER
Pt called in requesting for medication  -leg pain requesting same meds  I offered her virtual appt with another provider  She only wanted Dolsie and will wait until she comes in    Do you want refill medication or set her up with a virtual for next week,   ,

## 2020-07-01 NOTE — TELEPHONE ENCOUNTER
Pt is in [facility] which won't show on the TCM report  I will keep an eye out for the D/C to schedule her

## 2020-10-08 ENCOUNTER — TELEPHONE (OUTPATIENT)
Dept: FAMILY MEDICINE CLINIC | Facility: CLINIC | Age: 75
End: 2020-10-08

## 2020-10-08 DIAGNOSIS — G89.29 OTHER CHRONIC PAIN: ICD-10-CM

## 2020-10-13 DIAGNOSIS — G89.29 OTHER CHRONIC PAIN: ICD-10-CM

## 2020-10-13 RX ORDER — GABAPENTIN 300 MG/1
300 CAPSULE ORAL 3 TIMES DAILY
Qty: 270 CAPSULE | Refills: 1 | Status: SHIPPED | OUTPATIENT
Start: 2020-10-13 | End: 2020-10-14 | Stop reason: SDUPTHER

## 2020-10-13 RX ORDER — GABAPENTIN 300 MG/1
300 CAPSULE ORAL 3 TIMES DAILY
Qty: 270 CAPSULE | Refills: 1 | Status: CANCELLED | OUTPATIENT
Start: 2020-10-13

## 2020-10-14 DIAGNOSIS — G89.29 OTHER CHRONIC PAIN: ICD-10-CM

## 2020-10-14 RX ORDER — GABAPENTIN 300 MG/1
300 CAPSULE ORAL 3 TIMES DAILY
Qty: 270 CAPSULE | Refills: 1 | Status: SHIPPED | OUTPATIENT
Start: 2020-10-14 | End: 2021-10-12 | Stop reason: SDUPTHER

## 2021-03-25 DIAGNOSIS — L03.90 WOUND CELLULITIS: ICD-10-CM

## 2021-03-25 NOTE — TELEPHONE ENCOUNTER
Patients home nurse wants her to have the ointment she was on before, they were giving her the wrong one  Since Leah Serna is not in today could you send this in for the patient

## 2021-03-26 NOTE — TELEPHONE ENCOUNTER
Patient fell in her home Bathroom  Broke her left leg  She is at [facility]  [address] room 4B  [facility] ADULT PROGRESS NOTE    SUBJECTIVE  Patient seen and examined, chart reviewed. No acute events overnight. No complaints associated with his SPT. Draining well.        Current Outpatient Medications:   •  carvedilol 3.125 MG Oral Tab, Take 1 table

## 2021-06-07 ENCOUNTER — HOSPITAL ENCOUNTER (INPATIENT)
Facility: HOSPITAL | Age: 76
LOS: 7 days | Discharge: HOME HEALTH | DRG: 580 | End: 2021-06-14
Attending: EMERGENCY MEDICINE | Admitting: INTERNAL MEDICINE
Payer: COMMERCIAL

## 2021-06-07 ENCOUNTER — APPOINTMENT (EMERGENCY)
Dept: RADIOLOGY | Facility: HOSPITAL | Age: 76
DRG: 580 | End: 2021-06-07
Payer: COMMERCIAL

## 2021-06-07 DIAGNOSIS — D64.9 ANEMIA: ICD-10-CM

## 2021-06-07 DIAGNOSIS — L08.9 INFECTED ULCER OF SKIN (HCC): Primary | ICD-10-CM

## 2021-06-07 DIAGNOSIS — R06.02 SHORTNESS OF BREATH: ICD-10-CM

## 2021-06-07 DIAGNOSIS — D75.839 THROMBOCYTOSIS: ICD-10-CM

## 2021-06-07 DIAGNOSIS — L98.499 INFECTED ULCER OF SKIN (HCC): Primary | ICD-10-CM

## 2021-06-07 DIAGNOSIS — L03.116 CELLULITIS OF LEFT FOOT: ICD-10-CM

## 2021-06-07 DIAGNOSIS — D72.829 LEUKOCYTOSIS: ICD-10-CM

## 2021-06-07 PROBLEM — D50.9 MICROCYTIC ANEMIA: Status: ACTIVE | Noted: 2021-06-07

## 2021-06-07 PROBLEM — S81.802A OPEN WOUND OF LEFT LOWER LEG: Status: ACTIVE | Noted: 2021-06-07

## 2021-06-07 PROBLEM — S81.801A OPEN WOUND OF RIGHT LOWER LEG: Status: ACTIVE | Noted: 2021-06-07

## 2021-06-07 LAB
ANION GAP SERPL CALCULATED.3IONS-SCNC: 12 MMOL/L (ref 4–13)
BASOPHILS # BLD AUTO: 0.03 THOUSANDS/ΜL (ref 0–0.1)
BASOPHILS NFR BLD AUTO: 0 % (ref 0–1)
BUN SERPL-MCNC: 13 MG/DL (ref 5–25)
CALCIUM SERPL-MCNC: 8.9 MG/DL (ref 8.3–10.1)
CHLORIDE SERPL-SCNC: 106 MMOL/L (ref 100–108)
CO2 SERPL-SCNC: 24 MMOL/L (ref 21–32)
CREAT SERPL-MCNC: 0.74 MG/DL (ref 0.6–1.3)
EOSINOPHIL # BLD AUTO: 0.23 THOUSAND/ΜL (ref 0–0.61)
EOSINOPHIL NFR BLD AUTO: 2 % (ref 0–6)
ERYTHROCYTE [DISTWIDTH] IN BLOOD BY AUTOMATED COUNT: 17.7 % (ref 11.6–15.1)
GFR SERPL CREATININE-BSD FRML MDRD: 78 ML/MIN/1.73SQ M
GLUCOSE SERPL-MCNC: 90 MG/DL (ref 65–140)
HCT VFR BLD AUTO: 33.4 % (ref 34.8–46.1)
HGB BLD-MCNC: 9 G/DL (ref 11.5–15.4)
IMM GRANULOCYTES # BLD AUTO: 0.23 THOUSAND/UL (ref 0–0.2)
IMM GRANULOCYTES NFR BLD AUTO: 2 % (ref 0–2)
LACTATE SERPL-SCNC: 1.3 MMOL/L (ref 0.5–2)
LYMPHOCYTES # BLD AUTO: 1.15 THOUSANDS/ΜL (ref 0.6–4.47)
LYMPHOCYTES NFR BLD AUTO: 10 % (ref 14–44)
MCH RBC QN AUTO: 21.9 PG (ref 26.8–34.3)
MCHC RBC AUTO-ENTMCNC: 26.9 G/DL (ref 31.4–37.4)
MCV RBC AUTO: 81 FL (ref 82–98)
MONOCYTES # BLD AUTO: 2.01 THOUSAND/ΜL (ref 0.17–1.22)
MONOCYTES NFR BLD AUTO: 17 % (ref 4–12)
NEUTROPHILS # BLD AUTO: 8.35 THOUSANDS/ΜL (ref 1.85–7.62)
NEUTS SEG NFR BLD AUTO: 69 % (ref 43–75)
NRBC BLD AUTO-RTO: 0 /100 WBCS
NT-PROBNP SERPL-MCNC: 1433 PG/ML
PLATELET # BLD AUTO: 654 THOUSANDS/UL (ref 149–390)
PMV BLD AUTO: 10.9 FL (ref 8.9–12.7)
POTASSIUM SERPL-SCNC: 4.9 MMOL/L (ref 3.5–5.3)
RBC # BLD AUTO: 4.11 MILLION/UL (ref 3.81–5.12)
SODIUM SERPL-SCNC: 142 MMOL/L (ref 136–145)
TROPONIN I SERPL-MCNC: <0.02 NG/ML
WBC # BLD AUTO: 12 THOUSAND/UL (ref 4.31–10.16)

## 2021-06-07 PROCEDURE — 83880 ASSAY OF NATRIURETIC PEPTIDE: CPT | Performed by: EMERGENCY MEDICINE

## 2021-06-07 PROCEDURE — 80048 BASIC METABOLIC PNL TOTAL CA: CPT | Performed by: EMERGENCY MEDICINE

## 2021-06-07 PROCEDURE — 99285 EMERGENCY DEPT VISIT HI MDM: CPT

## 2021-06-07 PROCEDURE — 85025 COMPLETE CBC W/AUTO DIFF WBC: CPT | Performed by: EMERGENCY MEDICINE

## 2021-06-07 PROCEDURE — 87040 BLOOD CULTURE FOR BACTERIA: CPT | Performed by: EMERGENCY MEDICINE

## 2021-06-07 PROCEDURE — 99285 EMERGENCY DEPT VISIT HI MDM: CPT | Performed by: EMERGENCY MEDICINE

## 2021-06-07 PROCEDURE — 83605 ASSAY OF LACTIC ACID: CPT | Performed by: EMERGENCY MEDICINE

## 2021-06-07 PROCEDURE — 84484 ASSAY OF TROPONIN QUANT: CPT | Performed by: EMERGENCY MEDICINE

## 2021-06-07 PROCEDURE — 99223 1ST HOSP IP/OBS HIGH 75: CPT | Performed by: PHYSICIAN ASSISTANT

## 2021-06-07 PROCEDURE — 36415 COLL VENOUS BLD VENIPUNCTURE: CPT | Performed by: EMERGENCY MEDICINE

## 2021-06-07 PROCEDURE — 71046 X-RAY EXAM CHEST 2 VIEWS: CPT

## 2021-06-07 RX ORDER — OXYCODONE HYDROCHLORIDE 5 MG/1
2.5 TABLET ORAL EVERY 4 HOURS PRN
Status: DISCONTINUED | OUTPATIENT
Start: 2021-06-07 | End: 2021-06-14 | Stop reason: HOSPADM

## 2021-06-07 RX ORDER — HYDROMORPHONE HCL IN WATER/PF 6 MG/30 ML
0.2 PATIENT CONTROLLED ANALGESIA SYRINGE INTRAVENOUS
Status: DISCONTINUED | OUTPATIENT
Start: 2021-06-07 | End: 2021-06-14 | Stop reason: HOSPADM

## 2021-06-07 RX ORDER — OXYCODONE HYDROCHLORIDE 5 MG/1
5 TABLET ORAL EVERY 4 HOURS PRN
Status: DISCONTINUED | OUTPATIENT
Start: 2021-06-07 | End: 2021-06-14 | Stop reason: HOSPADM

## 2021-06-07 RX ORDER — KETOROLAC TROMETHAMINE 30 MG/ML
15 INJECTION, SOLUTION INTRAMUSCULAR; INTRAVENOUS ONCE
Status: COMPLETED | OUTPATIENT
Start: 2021-06-07 | End: 2021-06-07

## 2021-06-07 RX ADMIN — KETOROLAC TROMETHAMINE 15 MG: 30 INJECTION, SOLUTION INTRAMUSCULAR; INTRAVENOUS at 22:15

## 2021-06-07 RX ADMIN — CEFTRIAXONE SODIUM 1000 MG: 10 INJECTION, POWDER, FOR SOLUTION INTRAVENOUS at 22:21

## 2021-06-08 LAB
CHOLEST SERPL-MCNC: 108 MG/DL (ref 50–200)
FERRITIN SERPL-MCNC: 65 NG/ML (ref 8–388)
HDLC SERPL-MCNC: 32 MG/DL
IRON SATN MFR SERPL: 8 %
IRON SERPL-MCNC: 14 UG/DL (ref 50–170)
LDLC SERPL CALC-MCNC: 58 MG/DL (ref 0–100)
TIBC SERPL-MCNC: 186 UG/DL (ref 250–450)
TRIGL SERPL-MCNC: 92 MG/DL

## 2021-06-08 PROCEDURE — 80061 LIPID PANEL: CPT | Performed by: PHYSICIAN ASSISTANT

## 2021-06-08 PROCEDURE — 36415 COLL VENOUS BLD VENIPUNCTURE: CPT | Performed by: PHYSICIAN ASSISTANT

## 2021-06-08 PROCEDURE — 99232 SBSQ HOSP IP/OBS MODERATE 35: CPT | Performed by: INTERNAL MEDICINE

## 2021-06-08 PROCEDURE — 83550 IRON BINDING TEST: CPT | Performed by: PHYSICIAN ASSISTANT

## 2021-06-08 PROCEDURE — 83540 ASSAY OF IRON: CPT | Performed by: PHYSICIAN ASSISTANT

## 2021-06-08 PROCEDURE — 82728 ASSAY OF FERRITIN: CPT | Performed by: PHYSICIAN ASSISTANT

## 2021-06-08 RX ORDER — CEFAZOLIN SODIUM 1 G/50ML
1000 SOLUTION INTRAVENOUS EVERY 8 HOURS
Status: DISCONTINUED | OUTPATIENT
Start: 2021-06-08 | End: 2021-06-11

## 2021-06-08 RX ORDER — LANOLIN ALCOHOL/MO/W.PET/CERES
3 CREAM (GRAM) TOPICAL
Status: DISCONTINUED | OUTPATIENT
Start: 2021-06-08 | End: 2021-06-14 | Stop reason: HOSPADM

## 2021-06-08 RX ORDER — GABAPENTIN 300 MG/1
600 CAPSULE ORAL
Status: DISCONTINUED | OUTPATIENT
Start: 2021-06-08 | End: 2021-06-14 | Stop reason: HOSPADM

## 2021-06-08 RX ORDER — ACETAMINOPHEN 325 MG/1
650 TABLET ORAL EVERY 4 HOURS PRN
Status: DISCONTINUED | OUTPATIENT
Start: 2021-06-08 | End: 2021-06-14 | Stop reason: HOSPADM

## 2021-06-08 RX ORDER — HYDROCHLOROTHIAZIDE 12.5 MG/1
12.5 TABLET ORAL DAILY
Status: DISCONTINUED | OUTPATIENT
Start: 2021-06-08 | End: 2021-06-09

## 2021-06-08 RX ORDER — MAGNESIUM HYDROXIDE/ALUMINUM HYDROXICE/SIMETHICONE 120; 1200; 1200 MG/30ML; MG/30ML; MG/30ML
30 SUSPENSION ORAL EVERY 6 HOURS PRN
Status: DISCONTINUED | OUTPATIENT
Start: 2021-06-08 | End: 2021-06-14 | Stop reason: HOSPADM

## 2021-06-08 RX ORDER — B-COMPLEX WITH VITAMIN C
1 TABLET ORAL
Status: DISCONTINUED | OUTPATIENT
Start: 2021-06-08 | End: 2021-06-14 | Stop reason: HOSPADM

## 2021-06-08 RX ORDER — ATORVASTATIN CALCIUM 20 MG/1
20 TABLET, FILM COATED ORAL
Status: DISCONTINUED | OUTPATIENT
Start: 2021-06-08 | End: 2021-06-14 | Stop reason: HOSPADM

## 2021-06-08 RX ORDER — DOXYCYCLINE HYCLATE 50 MG/1
324 CAPSULE, GELATIN COATED ORAL
Status: DISCONTINUED | OUTPATIENT
Start: 2021-06-08 | End: 2021-06-14 | Stop reason: HOSPADM

## 2021-06-08 RX ORDER — ONDANSETRON 2 MG/ML
4 INJECTION INTRAMUSCULAR; INTRAVENOUS EVERY 6 HOURS PRN
Status: DISCONTINUED | OUTPATIENT
Start: 2021-06-08 | End: 2021-06-14 | Stop reason: HOSPADM

## 2021-06-08 RX ADMIN — ATORVASTATIN CALCIUM 20 MG: 20 TABLET, FILM COATED ORAL at 17:26

## 2021-06-08 RX ADMIN — OXYCODONE HYDROCHLORIDE 5 MG: 5 TABLET ORAL at 17:25

## 2021-06-08 RX ADMIN — CEFAZOLIN SODIUM 1000 MG: 1 SOLUTION INTRAVENOUS at 22:02

## 2021-06-08 RX ADMIN — ENOXAPARIN SODIUM 40 MG: 40 INJECTION SUBCUTANEOUS at 09:17

## 2021-06-08 RX ADMIN — Medication 3 MG: at 01:26

## 2021-06-08 RX ADMIN — CYANOCOBALAMIN TAB 500 MCG 1000 MCG: 500 TAB at 09:17

## 2021-06-08 RX ADMIN — HYDROCHLOROTHIAZIDE 12.5 MG: 12.5 TABLET ORAL at 09:17

## 2021-06-08 RX ADMIN — CEFAZOLIN SODIUM 1000 MG: 1 SOLUTION INTRAVENOUS at 14:48

## 2021-06-08 RX ADMIN — GABAPENTIN 600 MG: 300 CAPSULE ORAL at 22:00

## 2021-06-08 RX ADMIN — OXYCODONE HYDROCHLORIDE 2.5 MG: 5 TABLET ORAL at 01:25

## 2021-06-08 RX ADMIN — Medication 1 TABLET: at 09:17

## 2021-06-08 RX ADMIN — GABAPENTIN 600 MG: 300 CAPSULE ORAL at 01:27

## 2021-06-08 RX ADMIN — CEFAZOLIN SODIUM 1000 MG: 1 SOLUTION INTRAVENOUS at 06:17

## 2021-06-08 RX ADMIN — Medication 3 MG: at 22:00

## 2021-06-08 RX ADMIN — FERROUS GLUCONATE 324 MG: 324 TABLET ORAL at 17:25

## 2021-06-08 NOTE — ASSESSMENT & PLAN NOTE
· Patient presented with Fluid-filled blister that appears cloudy to left lateral ankle  States first noticed on day prior to admission  Erythema and warmth surrounding to mid foot and mid-thigh  Also has chronic ulceration to left medial ankle and right medial ankle/calf with surrounding erythema  · WBC 12 00  LA 1 3  Afebrile    Plan:  Currently slowly improving    Continue antibiotics, currently on cephazolin IV  Podiatry input appreciated - patient underwent incision and drainage of left small abscess of the heel  Monitor clinically, temperature curve, white count

## 2021-06-08 NOTE — ASSESSMENT & PLAN NOTE
· Patient presented with Fluid-filled blister that appears cloudy to left lateral ankle  States first noticed on day prior to admission  Erythema and warmth surrounding to mid foot and mid-thigh  Also has chronic ulceration to left medial ankle and right medial ankle/calf with surrounding erythema  · WBC 12 00  LA 1 3   Afebrile    Plan:  Continue antibiotics, currently on cephazolin IV  Podiatry input appreciated  Monitor clinically, temperature curve, white count

## 2021-06-08 NOTE — CASE MANAGEMENT
Referral made to [facility] for wound care, skilled nursing via [address] [place] [address] Drive

## 2021-06-08 NOTE — PROGRESS NOTES
[address] [place] [address]  Progress Note - Reynaldo SiVerion 1945, 67 y o  female MRN: [id number]  Unit/Bed#: ED 20 Encounter: [id number]  Primary Care Provider: TELLY Fitzgerald   Date and time admitted to hospital: 6/7/2021  7:43 PM    * Cellulitis of left foot  Assessment & Plan  · Patient presented with Fluid-filled blister that appears cloudy to left lateral ankle  States first noticed on day prior to admission  Erythema and warmth surrounding to mid foot and mid-thigh  Also has chronic ulceration to left medial ankle and right medial ankle/calf with surrounding erythema  · WBC 12 00  LA 1 3  Afebrile    Plan:  Continue antibiotics, currently on cephazolin IV  Podiatry input appreciated  Monitor clinically, temperature curve, white count    Open wound of right lower leg  Assessment & Plan  · Chronic ulceration to right medial ankle/calf x 1-1 5 years  · See AP above for LLE wound    Open wound of left lower leg  Assessment & Plan  · 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 1-1 5 years  States has been following with wound care clinic as outpt, but has been unable to go x 1 month 2/2 lack of transportation  (-) hx of DM  · Wound care consult  · CM consult to assist with wound care resources available as outpt/HH  · Wound care orders placed pending wound care nurse recommendations    Microcytic anemia  Assessment & Plan  · Hb decreased at 9 0 on admission  MCV 81  · No evidence of acute bleeding  · CBC and iron panel in am    Essential (primary) hypertension  Assessment & Plan  · BP adequately controlled currently  · States is no longer taking home dose Atenolol, HCTZ or Vastec because her BP was running too low   States sis not address this with PCP prior to discontinuing her meds  · Will restart home dose HCTZ but monitor kidney function closely  · Monitor BP per unit protocol        VTE Pharmacologic Prophylaxis:   Pharmacologic: Enoxaparin (Lovenox)  Mechanical VTE Prophylaxis in Place: Yes    Patient Centered Rounds: I have performed bedside rounds with nursing staff today  Discussions with Specialists or Other Care Team Provider:  Care team    Education and Discussions with Family / Patient:  Attempted to call number on file, no answer, left a message    Time Spent for Care: 30 minutes  More than 50% of total time spent on counseling and coordination of care as described above  Current Length of Stay: 1 day(s)    Current Patient Status: Inpatient   Certification Statement: The patient will continue to require additional inpatient hospital stay due to Need for IV antibiotics    Discharge Plan:  Once stable    Code Status: Level 2 - DNAR: but accepts endotracheal intubation      Subjective:     Patient evaluated this morning  She still complains of some pain on lower extremities  White count noted to be 2000  Tolerating antibiotics well otherwise  She denies any nausea vomiting diarrhea constipation  No other events reported  Objective:     Vitals:   Temp (24hrs), Av 4 °F (36 9 °C), Min:98 4 °F (36 9 °C), Max:98 4 °F (36 9 °C)    Temp:  [98 4 °F (36 9 °C)] 98 4 °F (36 9 °C)  HR:  [] 82  Resp:  [16-50] 18  BP: (106-157)/(55-94) 133/83  SpO2:  [94 %-97 %] 97 %  Body mass index is 24 21 kg/m²  Input and Output Summary (last 24 hours): Intake/Output Summary (Last 24 hours) at 2021 1203  Last data filed at 2021 2251  Gross per 24 hour   Intake 50 ml   Output --   Net 50 ml       Physical Exam:     Physical Exam  Vitals signs and nursing note reviewed  Constitutional:       Appearance: Normal appearance  She is normal weight  Comments: Elderly female in bed, awake   HENT:      Head: Normocephalic and atraumatic  Right Ear: External ear normal       Left Ear: External ear normal       Nose: Nose normal  No congestion  Mouth/Throat:      Mouth: Mucous membranes are moist       Pharynx: Oropharynx is clear   No oropharyngeal exudate or posterior oropharyngeal erythema  Eyes:      General: No scleral icterus  Right eye: No discharge  Left eye: No discharge  Extraocular Movements: Extraocular movements intact  Conjunctiva/sclera: Conjunctivae normal       Pupils: Pupils are equal, round, and reactive to light  Neck:      Musculoskeletal: Normal range of motion and neck supple  No neck rigidity or muscular tenderness  Cardiovascular:      Rate and Rhythm: Normal rate and regular rhythm  Pulses: Normal pulses  Heart sounds: Normal heart sounds  No murmur  No friction rub  No gallop  Pulmonary:      Effort: Pulmonary effort is normal  No respiratory distress  Breath sounds: Normal breath sounds  No stridor  No wheezing, rhonchi or rales  Chest:      Chest wall: No tenderness  Abdominal:      General: Abdomen is flat  Bowel sounds are normal  There is no distension  Palpations: Abdomen is soft  There is no mass  Tenderness: There is no abdominal tenderness  There is no guarding or rebound  Musculoskeletal: Normal range of motion  General: No swelling, tenderness, deformity or signs of injury  Skin:     General: Skin is warm and dry  Capillary Refill: Capillary refill takes less than 2 seconds  Coloration: Skin is not jaundiced or pale  Findings: Erythema and lesion present  No bruising or rash  Comments: Significant ulceration bilateral lower extremities with surrounding erythema, no significant drainage   Neurological:      General: No focal deficit present  Mental Status: She is alert and oriented to person, place, and time  Mental status is at baseline  Cranial Nerves: No cranial nerve deficit  Sensory: No sensory deficit  Motor: No weakness  Coordination: Coordination normal    Psychiatric:         Mood and Affect: Mood normal          Behavior: Behavior normal          Thought Content:  Thought content normal          Judgment: Judgment normal            Additional Data:     Labs:    Results from last 7 days   Lab Units 06/07/21 2051   WBC Thousand/uL 12 00*   HEMOGLOBIN g/dL 9 0*   HEMATOCRIT % 33 4*   PLATELETS Thousands/uL 654*   NEUTROS PCT % 69   LYMPHS PCT % 10*   MONOS PCT % 17*   EOS PCT % 2     Results from last 7 days   Lab Units 06/07/21 2051   SODIUM mmol/L 142   POTASSIUM mmol/L 4 9   CHLORIDE mmol/L 106   CO2 mmol/L 24   BUN mg/dL 13   CREATININE mg/dL 0 74   ANION GAP mmol/L 12   CALCIUM mg/dL 8 9   GLUCOSE RANDOM mg/dL 90                 Results from last 7 days   Lab Units 06/07/21 2051   LACTIC ACID mmol/L 1 3           * I Have Reviewed All Lab Data Listed Above  * Additional Pertinent Lab Tests Reviewed: Kandi [address] Admission Reviewed      Recent Cultures (last 7 days):     Results from last 7 days   Lab Units 06/07/21 2051   BLOOD CULTURE  Received in Microbiology Lab  Culture in Progress  Received in Microbiology Lab  Culture in Progress         Last 24 Hours Medication List:   Current Facility-Administered Medications   Medication Dose Route Frequency Provider Last Rate    acetaminophen  650 mg Oral Q4H PRN Ant Bingham PA-C      aluminum-magnesium hydroxide-simethicone  30 mL Oral Q6H PRN Ant Bingham PA-C      atorvastatin  20 mg Oral Daily With Aurora PumpLISA      calcium carbonate-vitamin D  1 tablet Oral Daily With Breakfast Ant Bingham PA-C      cefazolin  1,000 mg Intravenous Q8H La Nena Huynh Stopped (06/08/21 1369)   Clifm Jace cyanocobalamin  1,000 mcg Oral Daily Ant Bingham PA-C      enoxaparin  40 mg Subcutaneous Daily La Nena Huynh      ferrous gluconate  324 mg Oral BID AC Elvia Lockhart MD      gabapentin  600 mg Oral HS Ant Bingham PA-C      hydrochlorothiazide  12 5 mg Oral Daily La Nena Huynh      HYDROmorphone  0 2 mg Intravenous Q3H PRN Ant Bingham PA-C      melatonin  3 mg Oral HS Ant Bingham PA-C      ondansetron  4 mg Intravenous Q6H PRN Howie Simental PA-C      oxyCODONE  2 5 mg Oral Q4H PRN La Nena Haskins      oxyCODONE  5 mg Oral Q4H PRN Howie Simental PA-C          Today, Patient Was Seen By: Ann Amato MD    ** Please Note: Dictation voice to text software may have been used in the creation of this document   **

## 2021-06-08 NOTE — ASSESSMENT & PLAN NOTE
· Fluid-filled blister that appears cloudy to left lateral ankle  States first noticed on day prior to admission  Erythema and warmth surrounding to mid foot and mid-thigh  Also has chronic ulceration to left medial ankle and right medial ankle/calf with surrounding erythema  · WBC 12 00  LA 1 3  Afebrile  · Consult podiatry  · Received dose of Rocephin in ED   Will switch to Ancef pending podiatry recommendations  · Elevate heels off of bed  · Monitor temp and WBC trends as well as level of erythema

## 2021-06-08 NOTE — ED PROVIDER NOTES
History  Chief Complaint   Patient presents with    Weakness - Generalized     Patient reports generalized weakness, sob since earlier today      HPI    Prior to Admission Medications   Prescriptions Last Dose Informant Patient Reported? Taking?    Calcium Carb-Cholecalciferol (CALCIUM 600+D3) 600-200 MG-UNIT TABS   Yes No   Sig: Calcium 600 + D(3) 600 mg (1,500 mg)-200 unit tablet   Incontinence Supply Disposable (BRIEFS OVERNIGHT MEDIUM) MISC   No No   Sig: by Does not apply route 3 (three) times a day   LORazepam (ATIVAN) 0 5 mg tablet   Yes No   Sig: lorazepam 0 5 mg tablet   MAGNESIUM PO   Yes No   Si tablets   Omega-3 Fatty Acids ([employer] FISH OIL) 1200 MG CAPS   Yes No   Sig: Take 1 capsule by mouth Daily   acetaminophen, FOR EMS ONLY, (TYLENOL) 160 mg/5 mL suspension   Yes No   atenolol (TENORMIN) 25 mg tablet   Yes No   Si mg   atorvastatin (LIPITOR) 20 mg tablet   Yes No   Si mg   baclofen 10 mg tablet   Yes No   Sig: baclofen 10 mg tablet   collagenase (SANTYL) ointment   No No   Sig: Apply topically daily   cyanocobalamin (VITAMIN B-12) 100 MCG tablet   Yes No   Si,000 mcg   denosumab (PROLIA) 60 mg/mL   Yes No   Sig: Prolia 60 mg/mL subcutaneous syringe   enalapril (VASOTEC) 20 mg tablet   Yes No   Si mg   ergocalciferol (VITAMIN D2) 50,000 units   No No   Sig: Take 1 capsule (50,000 Units total) by mouth 2 (two) times a week for 16 doses   gabapentin (NEURONTIN) 300 mg capsule   No No   Sig: Take 1 capsule (300 mg total) by mouth 3 (three) times a day   hydrochlorothiazide (MICROZIDE) 12 5 mg capsule   Yes No   Sig: hydrochlorothiazide 12 5 mg capsule   mupirocin (BACTROBAN) 2 % ointment   No No   Sig: Apply topically 3 (three) times a day   naproxen (NAPROSYN) 500 mg tablet   Yes No   Sig: naproxen 500 mg tablet   traMADol HCl  MG CP24   No No   Sig: Take 1 capsule (100 mg total) by mouth daily   traZODone (DESYREL) 100 mg tablet   No No   Sig: Take 1 tablet (100 mg total) by mouth daily at bedtime      Facility-Administered Medications: None       Past Medical History:   Diagnosis Date    Osteoporosis     Shingles        Past Surgical History:   Procedure Laterality Date    CATARACT EXTRACTION Bilateral     FOOT SURGERY Right     reconstruction    TOTAL HIP ARTHROPLASTY Bilateral        Family History   Family history unknown: Yes     I have reviewed and agree with the history as documented  E-Cigarette/Vaping     E-Cigarette/Vaping Substances     Social History     Tobacco Use    Smoking status: Former Smoker    Smokeless tobacco: Never Used    Tobacco comment: quit 50 years ago   Substance Use Topics    Alcohol use: Yes     Alcohol/week: 7 0 standard drinks     Types: 7 Glasses of wine per week    Drug use: Never       Review of Systems    Physical Exam  Physical Exam  Vitals signs and nursing note reviewed  Constitutional:       General: She is not in acute distress  Appearance: She is well-developed  HENT:      Head: Normocephalic and atraumatic  Eyes:      Conjunctiva/sclera: Conjunctivae normal       Pupils: Pupils are equal, round, and reactive to light  Neck:      Musculoskeletal: Normal range of motion  Trachea: No tracheal deviation  Cardiovascular:      Rate and Rhythm: Normal rate and regular rhythm  Pulses:           Dorsalis pedis pulses are 2+ on the right side and 2+ on the left side  Heart sounds: Normal heart sounds  Pulmonary:      Effort: Pulmonary effort is normal  No respiratory distress  Breath sounds: Normal breath sounds  Abdominal:      General: There is no distension  Palpations: Abdomen is soft  Tenderness: There is no abdominal tenderness  Musculoskeletal:      Right ankle: She exhibits normal range of motion  Tenderness (Over ulcer)  Left ankle: She exhibits normal range of motion  Tenderness (Over ulcer)  Right lower leg: No edema  Left lower leg: No edema          Legs: Feet:    Skin:     General: Skin is warm and dry  Neurological:      Mental Status: She is alert and oriented to person, place, and time  GCS: GCS eye subscore is 4  GCS verbal subscore is 5  GCS motor subscore is 6     Psychiatric:         Behavior: Behavior normal          Vital Signs  ED Triage Vitals   Temperature Pulse Respirations Blood Pressure SpO2   06/07/21 2008 06/07/21 1946 06/07/21 1946 06/07/21 1946 06/07/21 1946   98 4 °F (36 9 °C) 98 22 141/75 97 %      Temp Source Heart Rate Source Patient Position - Orthostatic VS BP Location FiO2 (%)   06/07/21 2008 06/07/21 1946 06/07/21 1946 06/07/21 1946 --   Oral Monitor Lying Left arm       Pain Score       06/07/21 1946       No Pain           Vitals:    06/07/21 1946   BP: 141/75   Pulse: 98   Patient Position - Orthostatic VS: Lying         Visual Acuity      ED Medications  Medications   ketorolac (TORADOL) injection 15 mg (15 mg Intravenous Given 6/7/21 2215)   ceftriaxone (ROCEPHIN) 1 g/50 mL in dextrose IVPB (1,000 mg Intravenous New Bag 6/7/21 2221)       Diagnostic Studies  Results Reviewed     Procedure Component Value Units Date/Time    Basic metabolic panel [[id number]] Collected: 06/07/21 2051    Lab Status: Final result Specimen: Blood from Arm, Left Updated: 06/07/21 2138     Sodium 142 mmol/L      Potassium 4 9 mmol/L      Chloride 106 mmol/L      CO2 24 mmol/L      ANION GAP 12 mmol/L      BUN 13 mg/dL      Creatinine 0 74 mg/dL      Glucose 90 mg/dL      Calcium 8 9 mg/dL      eGFR 78 ml/min/1 73sq m     Narrative:      Laly guidelines for Chronic Kidney Disease (CKD):     Stage 1 with normal or high GFR (GFR > 90 mL/min/1 73 square meters)    Stage 2 Mild CKD (GFR = 60-89 mL/min/1 73 square meters)    Stage 3A Moderate CKD (GFR = 45-59 mL/min/1 73 square meters)    Stage 3B Moderate CKD (GFR = 30-44 mL/min/1 73 square meters)    Stage 4 Severe CKD (GFR = 15-29 mL/min/1 73 square meters)    Stage 5 End Stage CKD (GFR <15 mL/min/1 73 square meters)  Note: GFR calculation is accurate only with a steady state creatinine    Troponin I [[id number]]  (Normal) Collected: 06/07/21 2051    Lab Status: Final result Specimen: Blood from Arm, Left Updated: 06/07/21 2135     Troponin I <0 02 ng/mL     NT-BNP PRO [[id number]]  (Abnormal) Collected: 06/07/21 2051    Lab Status: Final result Specimen: Blood from Arm, Left Updated: 06/07/21 2123     NT-proBNP 1,433 pg/mL     Lactic acid [[id number]]  (Normal) Collected: 06/07/21 2051    Lab Status: Final result Specimen: Blood from Arm, Left Updated: 06/07/21 2118     LACTIC ACID 1 3 mmol/L     Narrative:      Result may be elevated if tourniquet was used during collection  CBC and differential [[id number]]  (Abnormal) Collected: 06/07/21 2051    Lab Status: Final result Specimen: Blood from Arm, Left Updated: 06/07/21 2059     WBC 12 00 Thousand/uL      RBC 4 11 Million/uL      Hemoglobin 9 0 g/dL      Hematocrit 33 4 %      MCV 81 fL      MCH 21 9 pg      MCHC 26 9 g/dL      RDW 17 7 %      MPV 10 9 fL      Platelets 661 Thousands/uL      nRBC 0 /100 WBCs      Neutrophils Relative 69 %      Immat GRANS % 2 %      Lymphocytes Relative 10 %      Monocytes Relative 17 %      Eosinophils Relative 2 %      Basophils Relative 0 %      Neutrophils Absolute 8 35 Thousands/µL      Immature Grans Absolute 0 23 Thousand/uL      Lymphocytes Absolute 1 15 Thousands/µL      Monocytes Absolute 2 01 Thousand/µL      Eosinophils Absolute 0 23 Thousand/µL      Basophils Absolute 0 03 Thousands/µL     Blood culture #1 [[id number]] Collected: 06/07/21 2051    Lab Status: In process Specimen: Blood from Arm, Left Updated: 06/07/21 2056    Blood culture #2 [[id number]] Collected: 06/07/21 2051    Lab Status:  In process Specimen: Blood from Arm, Left Updated: 06/07/21 2055                 XR chest 2 views    (Results Pending)              Procedures  ECG 12 Lead Documentation Only    Date/Time: 6/7/2021 9:55 PM  Performed by: Pavan Redmond MD  Authorized by: Pavan Redmond MD     Indications / Diagnosis:  SOB  ECG reviewed by me, the ED Provider: yes    Patient location:  ED  Previous ECG:     Previous ECG:  Unavailable  Interpretation:     Interpretation: normal    Quality:     Tracing quality:  Limited by artifact  Rate:     ECG rate:  96    ECG rate assessment: normal    Rhythm:     Rhythm: sinus rhythm    Ectopy:     Ectopy: none    QRS:     QRS axis:  Normal    QRS intervals:  Normal  Conduction:     Conduction: normal    ST segments:     ST segments:  Normal  T waves:     T waves: normal               ED Course                             SBIRT 20yo+      Most Recent Value   SBIRT (22 yo +)   In order to provide better care to our patients, we are screening all of our patients for alcohol and drug use  Would it be okay to ask you these screening questions? Yes Filed at: 06/07/2021 2008   Initial Alcohol Screen: US AUDIT-C    1  How often do you have a drink containing alcohol? 4 Filed at: 06/07/2021 2008   2  How many drinks containing alcohol do you have on a typical day you are drinking? 1 Filed at: 06/07/2021 2008   3b  FEMALE Any Age, or MALE 65+: How often do you have 4 or more drinks on one occassion? 0 Filed at: 06/07/2021 2008   Audit-C Score  5 Filed at: 06/07/2021 2008   REEMA: How many times in the past year have you    Used an illegal drug or used a prescription medication for non-medical reasons? Never Filed at: 06/07/2021 2008                    MDM  Number of Diagnoses or Management Options  Anemia: new and requires workup  Infected ulcer of skin ([facility] [address] ): new and requires workup  Leukocytosis: new and requires workup  Shortness of breath: new and requires workup  Thrombocytosis [facility]): new and requires workup  Diagnosis management comments: This is a 58-year-old female who presents here today for evaluation of shortness of breath    She states happened earlier today while she was sitting inside her form house, which is not air conditioned, with just a fan going  She said family got back from being out and thought that she seemed to be having trouble breathing, and made her come to the ER for evaluation  She denies fevers, nasal congestion, cough, chest pain, palpitations, nausea, vomiting, diarrhea, underlying problems with her heart or lungs  She states she feels better now that he is in the air conditioning  He denies any known sick contacts  She also notes wounds to her bilateral legs which is been present for awhile  She says she has not been to wound care in about a month due to transportation issues  She previously had home health nurse was not been to see her in some time, and her daughter helps change her dressings periodically  There were last changed on 6/4, and she feels like she needs help at home to help take care of these  She states she was told she has ulcers but does not know why they are there  She says she was checked for problems with her circulation previously and was told that everything looked fine  She denies problems with diabetes or injuries to the area  She denies fevers, increased drainage, redness, odor, myalgias or arthralgias, other systemic symptoms  Review of systems:  Otherwise negative unless stated above    She is well-appearing, in no acute distress  She has no respiratory distress, and lungs are clear  She has wounds to her bilateral ankles which are malodorous, down to the subcutaneous tissue  There is some oozing of blood from the left ankle when the dressing is removed, some purulence drainage from the right  There is surrounding erythema to both of them and tenderness  She has no crepitus, induration or fluctuance  Exam is otherwise unremarkable  Shortness of breath may be due to hot environment, however there is also concern for underlying infection, ACS, CHF contributing to her symptoms  She does have a actively infected wounds to her bilateral legs  We will check labs to evaluate for possible sepsis  Given the extent of the wounds and lack of appropriate care at home she will be admitted for further management of these  She does have a leukocytosis  Thrombocytosis is similar to prior  Anemia is similar to when last checked a year ago  Lactic acid is normal   BNP is mildly elevated at 1433 without prior for comparison  Chest x-ray was reviewed by myself, and shows no acute abnormalities  Lab work does not support severe sepsis or septic shock  Amount and/or Complexity of Data Reviewed  Clinical lab tests: reviewed and ordered  Tests in the radiology section of CPT®: reviewed and ordered  Decide to obtain previous medical records or to obtain history from someone other than the patient: yes  Review and summarize past medical records: yes  Independent visualization of images, tracings, or specimens: yes        Disposition  Final diagnoses:   Infected ulcer of skin (HCC)   Leukocytosis   Thrombocytosis ([facility] [address] )   Anemia   Shortness of breath     Time reflects when diagnosis was documented in both MDM as applicable and the Disposition within this note     Time User Action Codes Description Comment    6/7/2021 10:02 PM Chatman-Tesoriero, Clide Larch Add [R37 396,  L08 9] Infected ulcer of skin ([facility] [address] )     6/7/2021 10:02 PM Chatman-Tesoriero, Clide Larch Add [X12 873] Leukocytosis     6/7/2021 10:02 PM Chatman-Tesoriero, Clide Larch Add [D47 3] Thrombocytosis ([facility] [address] )     6/7/2021 10:02 PM Chatman-Tesoriero, Clide Larch Add [D64 9] Anemia     6/7/2021 10:02 PM Chatman-Tesoriero, Clide Larch Add [R06 02] Shortness of breath       ED Disposition     ED Disposition Condition Date/Time Comment    Admit Stable Mon Jun 7, 2021  9:48 PM Case was discussed with Lata Miller and the patient's admission status was agreed to be Admission Status: inpatient status to the service of Dr Angel Ferrer          Follow-up Information None         Patient's Medications   Discharge Prescriptions    No medications on file     No discharge procedures on file      PDMP Review     None          ED Provider  Electronically Signed by           Shereen Kelly MD  06/07/21 5274

## 2021-06-08 NOTE — UTILIZATION REVIEW
Initial Clinical Review    Admission: Date/Time/Statement:   Admission Orders (From admission, onward)     Ordered        06/07/21 2202  Inpatient Admission  Once                   Orders Placed This Encounter   Procedures    Inpatient Admission     Standing Status:   Standing     Number of Occurrences:   1     Order Specific Question:   Level of Care     Answer:   Med Surg [16]     Order Specific Question:   Estimated length of stay     Answer:   More than 2 Midnights     Order Specific Question:   Certification     Answer:   I certify that inpatient services are medically necessary for this patient for a duration of greater than two midnights  See H&P and MD Progress Notes for additional information about the patient's course of treatment  ED Arrival Information     Expected Arrival Acuity Means of Arrival Escorted By Service Admission Type    - 6/7/2021 19:34 Urgent Wheelchair Self General Medicine Urgent    Arrival Complaint    SOB        Chief Complaint   Patient presents with    Weakness - Generalized     Patient reports generalized weakness, sob since earlier today        Initial Presentation: 67year old female to the ED from home with complaints of weakness, shortness of breath  Admitted to inpatient for cellulitis foot, open wound left and right  lower leg  She was following with wound care, but hasn't gone in about a month due to lack of transportation  She also stopped taking her bp meds  Wound care consult  WBC elevated  Podiatry consult  There is redness and warmth from left mid foot to mid thigh  Monitor temps and WBCs  Restart HCTZ  Monitor bp  Date: 6/8   Day 2:  Continue IV abx        ED Triage Vitals   Temperature Pulse Respirations Blood Pressure SpO2   06/07/21 2008 06/07/21 1946 06/07/21 1946 06/07/21 1946 06/07/21 1946   98 4 °F (36 9 °C) 98 22 141/75 97 %      Temp Source Heart Rate Source Patient Position - Orthostatic VS BP Location FiO2 (%)   06/07/21 2008 06/07/21 1946 06/07/21 1946 06/07/21 1946 --   Oral Monitor Lying Left arm       Pain Score       06/07/21 1946       No Pain          Wt Readings from Last 1 Encounters:   06/07/21 62 kg (136 lb 11 oz)     Additional Vital Signs:   Date/Time  Temp  Pulse  Resp  BP  MAP (mmHg)  SpO2  O2 Device  Patient Position - Orthostatic VS   06/08/21 1030  --  82  18  133/83  104  97 %  None (Room air)  Lying   06/08/21 0800  --  69  18  124/56  80  96 %  None (Room air)  Lying   06/08/21 0700  --  75  20  119/55  79  95 %  None (Room air)  Lying   06/08/21 0630  --  86  20  106/56  70  97 %  None (Room air)  Lying   06/08/21 0600  --  76  16  118/62  84  94 %  None (Room air)  Lying   06/08/21 0300  --  85  20  128/67  90  95 %  None (Room air)  Lying   06/07/21 2300  --  104  50Abnormal   157/94  119  --  None (Room air)  Lying   06/07/21 2008  98 4 °F (36 9 °C)  --  --  --  --  --  --  --   06/07/21 1946  --  98  22  141/75  --  97 %  None (Room air)  Lying       Pertinent Labs/Diagnostic Test Results:   6/8 CXR: No active pulmonary disease on examination which is somewhat limited secondary to low lung volumes         Results from last 7 days   Lab Units 06/07/21 2051   WBC Thousand/uL 12 00*   HEMOGLOBIN g/dL 9 0*   HEMATOCRIT % 33 4*   PLATELETS Thousands/uL 654*   NEUTROS ABS Thousands/µL 8 35*         Results from last 7 days   Lab Units 06/07/21 2051   SODIUM mmol/L 142   POTASSIUM mmol/L 4 9   CHLORIDE mmol/L 106   CO2 mmol/L 24   ANION GAP mmol/L 12   BUN mg/dL 13   CREATININE mg/dL 0 74   EGFR ml/min/1 73sq m 78   CALCIUM mg/dL 8 9             Results from last 7 days   Lab Units 06/07/21 2051   GLUCOSE RANDOM mg/dL 90         Results from last 7 days   Lab Units 06/07/21 2051   TROPONIN I ng/mL <0 02         Results from last 7 days   Lab Units 06/07/21 2051   LACTIC ACID mmol/L 1 3       Results from last 7 days   Lab Units 06/07/21  2051   NT-PRO BNP pg/mL 1,433*     Results from last 7 days   Lab Units 06/08/21  0524 FERRITIN ng/mL 65     Results from last 7 days   Lab Units 06/07/21 2051   BLOOD CULTURE  Received in Microbiology Lab  Culture in Progress  Received in Microbiology Lab  Culture in Progress       ED Treatment:   Medication Administration from 06/07/2021 1933 to 06/08/2021 1247       Date/Time Order Dose Route Action     06/07/2021 2215 ketorolac (TORADOL) injection 15 mg 15 mg Intravenous Given     06/07/2021 2221 ceftriaxone (ROCEPHIN) 1 g/50 mL in dextrose IVPB 1,000 mg Intravenous New Bag     06/08/2021 0125 oxyCODONE (ROXICODONE) IR tablet 2 5 mg 2 5 mg Oral Given     06/08/2021 0917 calcium carbonate-vitamin D (OSCAL-D) 500 mg-200 units per tablet 1 tablet 1 tablet Oral Given     06/08/2021 0917 cyanocobalamin (VITAMIN B-12) tablet 1,000 mcg 1,000 mcg Oral Given     06/08/2021 0127 gabapentin (NEURONTIN) capsule 600 mg 600 mg Oral Given     06/08/2021 0917 hydrochlorothiazide (HYDRODIURIL) tablet 12 5 mg 12 5 mg Oral Given     06/08/2021 0126 melatonin tablet 3 mg 3 mg Oral Given     06/08/2021 0917 enoxaparin (LOVENOX) subcutaneous injection 40 mg 40 mg Subcutaneous Given     06/08/2021 0617 ceFAZolin (ANCEF) IVPB (premix in dextrose) 1,000 mg 50 mL 1,000 mg Intravenous New Bag        Past Medical History:   Diagnosis Date    Hyperlipidemia     Hypertension     Osteoporosis     Shingles        Admitting Diagnosis: Generalized weakness [R53 1]  SOB (shortness of breath) [R06 02]  Age/Sex: 67 y o  female  Admission Orders:  CBC, CMP    Scheduled Medications:  atorvastatin, 20 mg, Oral, Daily With Dinner  calcium carbonate-vitamin D, 1 tablet, Oral, Daily With Breakfast  cefazolin, 1,000 mg, Intravenous, Q8H  cyanocobalamin, 1,000 mcg, Oral, Daily  enoxaparin, 40 mg, Subcutaneous, Daily  ferrous gluconate, 324 mg, Oral, BID AC  gabapentin, 600 mg, Oral, HS  hydrochlorothiazide, 12 5 mg, Oral, Daily  melatonin, 3 mg, Oral, HS      Continuous IV Infusions:     PRN Meds:  acetaminophen, 650 mg, Oral, Q4H PRN  aluminum-magnesium hydroxide-simethicone, 30 mL, Oral, Q6H PRN  HYDROmorphone, 0 2 mg, Intravenous, Q3H PRN  ondansetron, 4 mg, Intravenous, Q6H PRN  oxyCODONE, 2 5 mg, Oral, Q4H PRN  oxyCODONE, 5 mg, Oral, Q4H PRN        IP CONSULT TO PODIATRY  IP CONSULT TO CASE MANAGEMENT  IP CONSULT TO PODIATRY    Network Utilization Review Department  ATTENTION: Please call with any questions or concerns to [phone] and carefully listen to the prompts so that you are directed to the right person  All voicemails are confidential   Nasima Chopra all requests for admission clinical reviews, approved or denied determinations and any other requests to dedicated fax number below belonging to the campus where the patient is receiving treatment   List of dedicated fax numbers for the Facilities:  [address] DENIALS (Administrative/Medical Necessity) [phone]   [address] (Maternity/NICU/Pediatrics) [phone]   [address] [place] [address] [address] [address] [place] [address] [address] [facility] [facility] [phone]

## 2021-06-08 NOTE — ASSESSMENT & PLAN NOTE
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

## 2021-06-08 NOTE — CASE MANAGEMENT
CM called  [phone]  phone number to call for additional nursing thru CM  Spoke with  Ana Leblanc  Ref# for conversation was [id number]  Verified that patient does have a [facility] benefit  Ana Leblanc had no information on OP CM benefit for chronic conditions and had no information re Nursing care for chronic conditions

## 2021-06-08 NOTE — ASSESSMENT & PLAN NOTE
· Hb decreased at 9 0 on admission   MCV 81  · No evidence of acute bleeding  · CBC and iron panel in am

## 2021-06-08 NOTE — ASSESSMENT & PLAN NOTE
· BP adequately controlled currently  · States is no longer taking home dose Atenolol, HCTZ or Vastec because her BP was running too low   States sis not address this with PCP prior to discontinuing her meds  · Will restart home dose HCTZ  · Monitor BP per unit protocol

## 2021-06-08 NOTE — H&P
[facility]  H&P- Lynda Blazing 1945, 67 y o  female MRN: [id number]  Unit/Bed#: ED 20 Encounter: [id number]  Primary Care Provider: TELLY Muñoz   Date and time admitted to hospital: 6/7/2021  7:43 PM    * Cellulitis of left foot  Assessment & Plan  · Fluid-filled blister that appears cloudy to left lateral ankle  States first noticed on day prior to admission  Erythema and warmth surrounding to mid foot and mid-thigh  Also has chronic ulceration to left medial ankle and right medial ankle/calf with surrounding erythema  · WBC 12 00  LA 1 3  Afebrile  · Consult podiatry  · Received dose of Rocephin in ED  Will switch to Ancef pending podiatry recommendations  · Elevate heels off of bed  · Monitor temp and WBC trends as well as level of erythema    Open wound of left lower leg  Assessment & Plan  · 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 1-1 5 years  States has been following with wound care clinic as outpt, but has been unable to go x 1 month 2/2 lack of transportation  (-) hx of DM  · Wound care consult  · CM consult to assist with wound care resources available as outpt/HH  · Wound care orders placed pending wound care nurse recommendations    Open wound of right lower leg  Assessment & Plan  · Chronic ulceration to right medial ankle/calf x 1-1 5 years  · See AP above for LLE wound    Microcytic anemia  Assessment & Plan  · Hb decreased at 9 0 on admission  MCV 81  · No evidence of acute bleeding  · CBC and iron panel in am    Essential (primary) hypertension  Assessment & Plan  · BP adequately controlled currently  · States is no longer taking home dose Atenolol, HCTZ or Vastec because her BP was running too low   States sis not address this with PCP prior to discontinuing her meds  · Will restart home dose HCTZ  · Monitor BP per unit protocol      VTE Prophylaxis: Enoxaparin (Lovenox)  / reason for no mechanical VTE prophylaxis BLE wounds to legs and feet   Code Status: Level 2 Accepts intubation  POLST: POLST form is not discussed and not completed at this time  Discussion with family: NA    Anticipated Length of Stay:  Patient will be admitted on an Inpatient basis with an anticipated length of stay of  Greater than 2 midnights  Justification for Hospital Stay: See AP above    Total Time for Visit, including Counseling / Coordination of Care: 45 minutes  Greater than 50% of this total time spent on direct patient counseling and coordination of care  Chief Complaint:   BLE wounds    History of Present Illness:    Trini Obregon is a 67 y o  female who presents with BLE wounds with cellulitis  Reports chronic open wounds to BLE x 1-1 5 years  States she follows with wound care clinic as outpt but has been unable to go x 1 month 2/2 lack of transportation  Reports no longer taking prescription statin and BP meds 2/2 BP getting too low  States she did not discuss this with PCP  Review of Systems:    Review of Systems   Constitutional: Negative for appetite change, chills and fever  HENT: Negative for congestion, ear pain, sinus pressure and sore throat  Eyes: Negative for visual disturbance  Respiratory: Negative for cough, shortness of breath and wheezing  Cardiovascular: Positive for leg swelling  Negative for chest pain and palpitations  Gastrointestinal: Negative for abdominal pain, constipation, diarrhea, nausea and vomiting  Genitourinary: Negative for difficulty urinating, dysuria and frequency  Musculoskeletal: Negative for neck pain and neck stiffness  Skin: Positive for wound  Neurological: Negative for dizziness, syncope, light-headedness and headaches  All other systems reviewed and are negative        Past Medical and Surgical History:     Past Medical History:   Diagnosis Date    Hyperlipidemia     Hypertension     Osteoporosis     Shingles        Past Surgical History:   Procedure Laterality Date    CATARACT EXTRACTION Bilateral  FOOT SURGERY Right     reconstruction    TOTAL HIP ARTHROPLASTY Bilateral        Meds/Allergies:    Prior to Admission medications    Medication Sig Start Date End Date Taking?  Authorizing Provider   acetaminophen, FOR EMS ONLY, (TYLENOL) 160 mg/5 mL suspension     Historical Provider, MD   atenolol (TENORMIN) 25 mg tablet 25 mg 4/11/18   Historical Provider, MD   atorvastatin (LIPITOR) 20 mg tablet 20 mg    Historical Provider, MD   baclofen 10 mg tablet baclofen 10 mg tablet    Historical Provider, MD   Calcium Carb-Cholecalciferol (CALCIUM 600+D3) 600-200 MG-UNIT TABS Calcium 600 + D(3) 600 mg (1,500 mg)-200 unit tablet 12/1/14   Historical Provider, MD   collagenase (SANTYL) ointment Apply topically daily 3/25/21   TELLY Moore   cyanocobalamin (VITAMIN B-12) 100 MCG tablet 1,000 mcg    Historical Provider, MD   denosumab (PROLIA) 60 mg/mL Prolia 60 mg/mL subcutaneous syringe 9/6/16   Historical Provider, MD   enalapril (VASOTEC) 20 mg tablet 20 mg 11/3/17   Historical Provider, MD   ergocalciferol (VITAMIN D2) 50,000 units Take 1 capsule (50,000 Units total) by mouth 2 (two) times a week for 16 doses 5/7/20 6/30/20  TELLY Looney   gabapentin (NEURONTIN) 300 mg capsule Take 1 capsule (300 mg total) by mouth 3 (three) times a day  Patient taking differently: Take 600 mg by mouth daily at bedtime  10/14/20   TELLY Garcia   hydrochlorothiazide (MICROZIDE) 12 5 mg capsule hydrochlorothiazide 12 5 mg capsule    Historical Provider, MD   Incontinence Supply Disposable (BRIEFS OVERNIGHT MEDIUM) MISC by Does not apply route 3 (three) times a day 10/24/19 11/23/19  TELLY Looney   LORazepam (ATIVAN) 0 5 mg tablet lorazepam 0 5 mg tablet    Historical Provider, MD   MAGNESIUM PO 2 tablets    Historical Provider, MD   mupirocin (BACTROBAN) 2 % ointment Apply topically 3 (three) times a day  Patient not taking: Reported on 6/7/2021 5/20/20   TELLY Garcia   naproxen (NAPROSYN) 500 mg tablet naproxen 500 mg tablet 4/10/17   Historical Provider, MD   Omega-3 Fatty Acids ([employer] FISH OIL) 1200 MG CAPS Take 1 capsule by mouth Daily    Historical Provider, MD   traMADol HCl  MG CP24 Take 1 capsule (100 mg total) by mouth daily  Patient not taking: Reported on 6/7/2021 10/24/19   TELLY Miguel   traZODone (DESYREL) 100 mg tablet Take 1 tablet (100 mg total) by mouth daily at bedtime  Patient not taking: Reported on 6/7/2021 5/20/20   TELLY Valencia     I have reviewed home medications with patient personally  Allergies: Allergies   Allergen Reactions    Aspirin        Social History:     Marital Status:    Patient Pre-hospital Living Situation: Lives with grand daughter and great grandchildren  Patient Pre-hospital Level of Mobility: Ambulatory w rollator  Patient Pre-hospital Diet Restrictions: None  Substance Use History:   Social History     Substance and Sexual Activity   Alcohol Use Yes    Alcohol/week: 7 0 standard drinks    Types: 7 Glasses of wine per week    Frequency: 2-3 times a week    Drinks per session: 1 or 2     Social History     Tobacco Use   Smoking Status Former Smoker   Smokeless Tobacco Never Used   Tobacco Comment    quit 50 years ago     Social History     Substance and Sexual Activity   Drug Use Never       Family History:    Family History   Family history unknown: Yes       Physical Exam:     Vitals:   Blood Pressure: 157/94 (06/07/21 2300)  Pulse: 104 (06/07/21 2300)  Temperature: 98 4 °F (36 9 °C) (06/07/21 2008)  Temp Source: Oral (06/07/21 2008)  Respirations: (!) 50 (06/07/21 2300)  Height: 5' 3" (160 cm) (06/07/21 2008)  Weight - Scale: 62 kg (136 lb 11 oz) (06/07/21 2008)  SpO2: 97 % (06/07/21 1946)    Physical Exam  Vitals signs reviewed  Constitutional:       General: She is not in acute distress  HENT:      Head: Normocephalic and atraumatic        Mouth/Throat:      Comments: deferred  Eyes:      Extraocular Movements: Extraocular movements intact  Neck:      Musculoskeletal: Normal range of motion and neck supple  Cardiovascular:      Rate and Rhythm: Normal rate and regular rhythm  Pulses: Normal pulses  Heart sounds: Normal heart sounds  Pulmonary:      Effort: Pulmonary effort is normal  No respiratory distress  Breath sounds: Normal breath sounds  No wheezing or rhonchi  Abdominal:      Palpations: Abdomen is soft  Tenderness: There is no abdominal tenderness  There is no guarding or rebound  Musculoskeletal: Normal range of motion  Right lower leg: Edema (trace) present  Left lower leg: Edema (trace) present  Skin:     General: Skin is warm and dry  Findings: Erythema (BLE) present  Comments: Open wounds to BLE  Fluid filled blister to left lateral heel  Neurological:      General: No focal deficit present  Mental Status: She is alert and oriented to person, place, and time  Psychiatric:         Mood and Affect: Mood normal          Behavior: Behavior normal          Thought Content: Thought content normal          Additional Data:     Lab Results: I have personally reviewed pertinent reports        Results from last 7 days   Lab Units 06/07/21 2051   WBC Thousand/uL 12 00*   HEMOGLOBIN g/dL 9 0*   HEMATOCRIT % 33 4*   PLATELETS Thousands/uL 654*   NEUTROS PCT % 69   LYMPHS PCT % 10*   MONOS PCT % 17*   EOS PCT % 2     Results from last 7 days   Lab Units 06/07/21  2051   SODIUM mmol/L 142   POTASSIUM mmol/L 4 9   CHLORIDE mmol/L 106   CO2 mmol/L 24   BUN mg/dL 13   CREATININE mg/dL 0 74   ANION GAP mmol/L 12   CALCIUM mg/dL 8 9   GLUCOSE RANDOM mg/dL 90                 Results from last 7 days   Lab Units 06/07/21 2051   LACTIC ACID mmol/L 1 3       Imaging: I have personally reviewed pertinent films in PACS    XR chest 2 views    (Results Pending)       EKG, Pathology, and Other Studies Reviewed on Admission:   · EKG: NA    Allscripts / Epic Records Reviewed: Yes     ** Please Note: This note has been constructed using a voice recognition system   **

## 2021-06-08 NOTE — CASE MANAGEMENT
PT LOS: 1 day  PATIENT CLASS IP  PATIENT IS NOT A READMISSION  PATIENT IS NOT A BUNDLE  CM met with patient at bedside to complete CM open and discuss discharge planning  Patient lives in [facility] in [place]  Patient lives with her grand daughter and 6 great grand children  Home set up is one floor with 2 NALINI  Patient has DME: a rollator and walker and a WC  Patient is Min assist  with ADLs and  Uses rollator kev WC for Mobility  Patient states she is retired, she does not drive  [address] daughter provides transportation  Patient has no  admissions and no MH hx:   Patient has hx of smoking no other hx of SA  Patients PCP is: Stuart Chamberlain and uses [facility] for pharmacy  Patient has used [facility] in  Past  She does not want them at discharge but is open to all other agencies Patientis agreeable to:[facility] at discharge  Patient agrees to a [facility] referral to assess for additional help at home  Patient also states that she is eligible for Nursing support thru customer service  Patient gave CM [phone] as the phone number to call  Patient is able to afford her meds, states has enough food at home thru food stamps  Patient does have POA, Her grand daughter Antoinette Marrow [phone]  Patient's anticipated transportation home is:grand daughter   CM reviewed discharge planning process including the following: identifying caregivers at home, preference for d/c planning needs,   availability of [facility] Meds to Bed program, availability of treatment team to discuss questions or concerns patient and/or family may have regarding diagnosis, plan of care, old or new medications and discharge planning   CM will continue to follow for care coordination and update assessment as appropriate  Patient is a 68 yo female admitted for cellulitis  Bilateral LE   IV abx x2 , pain control, wound care, PLOF: WC mobility with WC and Rollator, Min A with adls ( LE Bathing and dressing)   DCP:  Needs more help at home  Elyssa  referral  Ramez Kramer referral Pending in [facility] [address]   Transportation: grand daughter Hoa Tang

## 2021-06-08 NOTE — ASSESSMENT & PLAN NOTE
· BP adequately controlled currently  · States is no longer taking home dose Atenolol, HCTZ or Vastec because her BP was running too low   States sis not address this with PCP prior to discontinuing her meds  · Will restart home dose HCTZ but monitor kidney function closely  · Monitor BP per unit protocol

## 2021-06-09 ENCOUNTER — APPOINTMENT (INPATIENT)
Dept: RADIOLOGY | Facility: HOSPITAL | Age: 76
DRG: 580 | End: 2021-06-09
Payer: COMMERCIAL

## 2021-06-09 LAB
ALBUMIN SERPL BCP-MCNC: 2.2 G/DL (ref 3.5–5)
ALP SERPL-CCNC: 90 U/L (ref 46–116)
ALT SERPL W P-5'-P-CCNC: 12 U/L (ref 12–78)
ANION GAP SERPL CALCULATED.3IONS-SCNC: 9 MMOL/L (ref 4–13)
AST SERPL W P-5'-P-CCNC: 16 U/L (ref 5–45)
BASOPHILS # BLD AUTO: 0.02 THOUSANDS/ΜL (ref 0–0.1)
BASOPHILS NFR BLD AUTO: 0 % (ref 0–1)
BILIRUB SERPL-MCNC: 0.37 MG/DL (ref 0.2–1)
BUN SERPL-MCNC: 13 MG/DL (ref 5–25)
CALCIUM ALBUM COR SERPL-MCNC: 10 MG/DL (ref 8.3–10.1)
CALCIUM SERPL-MCNC: 8.6 MG/DL (ref 8.3–10.1)
CHLORIDE SERPL-SCNC: 105 MMOL/L (ref 100–108)
CO2 SERPL-SCNC: 27 MMOL/L (ref 21–32)
CREAT SERPL-MCNC: 0.71 MG/DL (ref 0.6–1.3)
EOSINOPHIL # BLD AUTO: 0.23 THOUSAND/ΜL (ref 0–0.61)
EOSINOPHIL NFR BLD AUTO: 3 % (ref 0–6)
ERYTHROCYTE [DISTWIDTH] IN BLOOD BY AUTOMATED COUNT: 17.4 % (ref 11.6–15.1)
GFR SERPL CREATININE-BSD FRML MDRD: 82 ML/MIN/1.73SQ M
GLUCOSE SERPL-MCNC: 99 MG/DL (ref 65–140)
HCT VFR BLD AUTO: 29.7 % (ref 34.8–46.1)
HGB BLD-MCNC: 8.1 G/DL (ref 11.5–15.4)
IMM GRANULOCYTES # BLD AUTO: 0.15 THOUSAND/UL (ref 0–0.2)
IMM GRANULOCYTES NFR BLD AUTO: 2 % (ref 0–2)
LYMPHOCYTES # BLD AUTO: 1.12 THOUSANDS/ΜL (ref 0.6–4.47)
LYMPHOCYTES NFR BLD AUTO: 14 % (ref 14–44)
MCH RBC QN AUTO: 22.1 PG (ref 26.8–34.3)
MCHC RBC AUTO-ENTMCNC: 27.3 G/DL (ref 31.4–37.4)
MCV RBC AUTO: 81 FL (ref 82–98)
MONOCYTES # BLD AUTO: 2.09 THOUSAND/ΜL (ref 0.17–1.22)
MONOCYTES NFR BLD AUTO: 26 % (ref 4–12)
NEUTROPHILS # BLD AUTO: 4.46 THOUSANDS/ΜL (ref 1.85–7.62)
NEUTS SEG NFR BLD AUTO: 55 % (ref 43–75)
NRBC BLD AUTO-RTO: 0 /100 WBCS
PLATELET # BLD AUTO: 586 THOUSANDS/UL (ref 149–390)
PMV BLD AUTO: 11.1 FL (ref 8.9–12.7)
POTASSIUM SERPL-SCNC: 4.1 MMOL/L (ref 3.5–5.3)
PROT SERPL-MCNC: 6.5 G/DL (ref 6.4–8.2)
RBC # BLD AUTO: 3.67 MILLION/UL (ref 3.81–5.12)
SODIUM SERPL-SCNC: 141 MMOL/L (ref 136–145)
WBC # BLD AUTO: 8.07 THOUSAND/UL (ref 4.31–10.16)

## 2021-06-09 PROCEDURE — 0J9R0ZZ DRAINAGE OF LEFT FOOT SUBCUTANEOUS TISSUE AND FASCIA, OPEN APPROACH: ICD-10-PCS | Performed by: PHYSICIAN ASSISTANT

## 2021-06-09 PROCEDURE — 87205 SMEAR GRAM STAIN: CPT | Performed by: PODIATRIST

## 2021-06-09 PROCEDURE — 80053 COMPREHEN METABOLIC PANEL: CPT | Performed by: INTERNAL MEDICINE

## 2021-06-09 PROCEDURE — 97163 PT EVAL HIGH COMPLEX 45 MIN: CPT

## 2021-06-09 PROCEDURE — 85025 COMPLETE CBC W/AUTO DIFF WBC: CPT | Performed by: INTERNAL MEDICINE

## 2021-06-09 PROCEDURE — 87186 SC STD MICRODIL/AGAR DIL: CPT | Performed by: PODIATRIST

## 2021-06-09 PROCEDURE — 73630 X-RAY EXAM OF FOOT: CPT

## 2021-06-09 PROCEDURE — 87075 CULTR BACTERIA EXCEPT BLOOD: CPT | Performed by: PODIATRIST

## 2021-06-09 PROCEDURE — 87070 CULTURE OTHR SPECIMN AEROBIC: CPT | Performed by: PODIATRIST

## 2021-06-09 PROCEDURE — 99232 SBSQ HOSP IP/OBS MODERATE 35: CPT | Performed by: INTERNAL MEDICINE

## 2021-06-09 RX ORDER — SODIUM CHLORIDE, SODIUM GLUCONATE, SODIUM ACETATE, POTASSIUM CHLORIDE, MAGNESIUM CHLORIDE, SODIUM PHOSPHATE, DIBASIC, AND POTASSIUM PHOSPHATE .53; .5; .37; .037; .03; .012; .00082 G/100ML; G/100ML; G/100ML; G/100ML; G/100ML; G/100ML; G/100ML
50 INJECTION, SOLUTION INTRAVENOUS CONTINUOUS
Status: DISCONTINUED | OUTPATIENT
Start: 2021-06-09 | End: 2021-06-13

## 2021-06-09 RX ADMIN — CYANOCOBALAMIN TAB 500 MCG 1000 MCG: 500 TAB at 08:20

## 2021-06-09 RX ADMIN — CEFAZOLIN SODIUM 1000 MG: 1 SOLUTION INTRAVENOUS at 14:22

## 2021-06-09 RX ADMIN — ATORVASTATIN CALCIUM 20 MG: 20 TABLET, FILM COATED ORAL at 16:24

## 2021-06-09 RX ADMIN — CEFAZOLIN SODIUM 1000 MG: 1 SOLUTION INTRAVENOUS at 21:40

## 2021-06-09 RX ADMIN — SODIUM CHLORIDE, SODIUM GLUCONATE, SODIUM ACETATE, POTASSIUM CHLORIDE, MAGNESIUM CHLORIDE, SODIUM PHOSPHATE, DIBASIC, AND POTASSIUM PHOSPHATE 50 ML/HR: .53; .5; .37; .037; .03; .012; .00082 INJECTION, SOLUTION INTRAVENOUS at 15:18

## 2021-06-09 RX ADMIN — Medication 3 MG: at 21:40

## 2021-06-09 RX ADMIN — FERROUS GLUCONATE 324 MG: 324 TABLET ORAL at 08:27

## 2021-06-09 RX ADMIN — CEFAZOLIN SODIUM 1000 MG: 1 SOLUTION INTRAVENOUS at 06:08

## 2021-06-09 RX ADMIN — GABAPENTIN 600 MG: 300 CAPSULE ORAL at 21:40

## 2021-06-09 RX ADMIN — OXYCODONE HYDROCHLORIDE 5 MG: 5 TABLET ORAL at 06:11

## 2021-06-09 RX ADMIN — HYDROMORPHONE HYDROCHLORIDE 0.2 MG: 0.2 INJECTION, SOLUTION INTRAMUSCULAR; INTRAVENOUS; SUBCUTANEOUS at 07:34

## 2021-06-09 RX ADMIN — Medication 1 TABLET: at 08:20

## 2021-06-09 RX ADMIN — ENOXAPARIN SODIUM 40 MG: 40 INJECTION SUBCUTANEOUS at 08:20

## 2021-06-09 RX ADMIN — FERROUS GLUCONATE 324 MG: 324 TABLET ORAL at 16:24

## 2021-06-09 NOTE — WOUND OSTOMY CARE
Progress Note - Wound   Dwyane Draft 67 y o  female MRN: [id number]  Unit/Bed#: -01 Encounter: [id number]      Assessment:   Patient admitted due to cellulitis of left foot  History of HLD, HTN, and osteoporosis  Wound care consulted for lower extremity wounds, podiatry was also consulted and has evaluated the wounds and placed orders  Wound care will sign off and podiatry will be following  Patient agreeable to assessment, alert and oriented x4, continent of bowel and bladder, independently turns for assessment, heels elevated off of mattress, dependent for care at this time due to NWB to left foot per podiatry  1  Bilateral lower extremities being following and evaluated by podiatry  Follow orders per podiatry for wound care treatment  2  Bilateral sacrum and buttock, and right heels are dry, intact, no redness  Educated patient on importance of frequent offloading of pressure via turning, repositioning, and weight-shifting  Verbalized understanding of plan of care  Primary nurse aware of plan of care  See flow sheets for more detailed assessment findings  Will follow along  Skin care plans:  1-Hydraguard to bilateral sacrum, buttock and right heel BID and PRN  2-Elevate heels to offload pressure  3-Ehob cushion in chair when out of bed  4-Moisturize skin daily with skin nourishing cream   5-Turn/reposition q2h or when medically stable for pressure re-distribution on skin  6-Wound care will sign off at this time  Follow orders per podiatry for lower extremity wound care           Wound Care will sign off  Call or Tigertext with any questions    Hermelindo Wahl RN BSN  Wound care

## 2021-06-09 NOTE — PLAN OF CARE
Problem: PHYSICAL THERAPY ADULT  Goal: Performs mobility at highest level of function for planned discharge setting  See evaluation for individualized goals  Description: Treatment/Interventions: Functional transfer training, LE strengthening/ROM, Therapeutic exercise, Endurance training, Patient/family training, Bed mobility, Continued evaluation, Spoke to nursing          See flowsheet documentation for full assessment, interventions and recommendations  Note: Prognosis: Good  Problem List: Decreased strength, Decreased endurance, Impaired balance, Decreased mobility, Impaired sensation, Decreased skin integrity(podiatry restrictions)  Assessment: Pt is 67year old female seen for PT evaluation s/p admit to [facility] on 6/7/2021 with Cellulitis of left foot  PT consulted to assess pt's functional mobility and d/c needs  Order placed for PT eval and tx, with up with assist order  Comorbidities affecting pt's physical performance at time of assessment include essential primary hypertension, open wound left and right lower leg, and microcytic anemia  PTA, pt was independent with stand pivot transfers  Personal factors affecting pt at time of IE include stairs to enter home, inability to ambulate household distances, inability to navigate community distances, inability to navigate level surfaces without external assistance, unable to perform dynamic tasks in community, positive fall history, inability to perform IADLs, and inability to perform ADLs  Please find objective findings from PT assessment regarding body systems outlined above with impairments and limitations including weakness, impaired balance, decreased endurance, inability to ambulate/perform stand pivot transfers, decreased activity tolerance, decreased functional mobility tolerance, altered sensation, fall risk, and decreased skin integrity   The following objective measures performed on IE also reveal limitations: Barthel Index: 45/100 and Modified Mireille: 4 (moderate/severe disability)  Pt's clinical presentation is currently unstable/unpredictable seen in pt's presentation of need for ongoing medical management/monitoring, pt is a fall risk, and pt requires cues/assist for safety with functional mobility  Pt to benefit from continued PT tx to address deficits as defined above and maximize level of functional independent mobility and consistency  From PT/mobility standpoint, recommendation at time of d/c would be STR pending progress in order to facilitate return to PLOF  Barriers to Discharge: Inaccessible home environment, Other (Comment)(decline in functional status)     PT Discharge Recommendation: Post acute rehabilitation services     PT - OK to Discharge: Yes(when medically cleared, if to STR)    See flowsheet documentation for full assessment

## 2021-06-09 NOTE — CONSULTS
Consult - Podiatry   Emy Loyd 67 y o  female MRN: [id number]  Unit/Bed#: -01 Encounter: [id number]    Assessment/Plan     Assessment:  Chronic Leg ulcers bilateral  Ulcer left heel with abscess, cellulitis  Plan:  Reviewed chart and labs  Reviewed pathophysiology and treatment with the patient  Leg wounds were cleansed and dressed  I and D of small abscess on left heel was performed at the bedside  Deep swabs were taken for culture and sensitivity aerobic and anaerobic  The wound was irrigated and dressed  General skin precautions should be observed  Additional diagnostic modalities or changes in the treatment regimen will be predicated by the clinical course and appearance and result of labs and x-rays  The patient has recently been a patient at the [facility] but stopped going about a month ago on account of difficulty with transportation  She is going to need home health nursing for wound care at least 3 times a week  She really should also be following up in the [address] [place] [address]  History of Present Illness     HPI:  Emy Loyd is a 67 y o  female who presents with bilateral leg wounds which have been being treated in the local wound care center  She also has new or ulcerations in the lateral aspect of the left heel which is of very recent origin according to the patient  Consults  Review of Systems   Constitutional:  Per admit H&P  HENT:  Per admit H&P  Eyes:  Per admit H&P  Respiratory: per admit H&P tive  Cardiovascular:  Per admit H&P  Gastrointestinal:  Per admit H&P  Musculoskeletal:  Bilateral lower extremity weakness  Patient reports has been nonambulatory and uses a wheelchair or scooter  Skin:  Chronic leg ulcerations and new ulcerations in the left heel   Neurological: Negative  Psych: negative         Historical Information   Past Medical History:   Diagnosis Date    Hyperlipidemia     Hypertension     Osteoporosis     Shingles      Past Surgical History:   Procedure Laterality Date    CATARACT EXTRACTION Bilateral     FOOT SURGERY Right     reconstruction    TOTAL HIP ARTHROPLASTY Bilateral      Social History   Social History     Substance and Sexual Activity   Alcohol Use Yes    Alcohol/week: 7 0 standard drinks    Types: 7 Glasses of wine per week    Frequency: 2-3 times a week    Drinks per session: 1 or 2     Social History     Substance and Sexual Activity   Drug Use Never     Social History     Tobacco Use   Smoking Status Former Smoker   Smokeless Tobacco Never Used   Tobacco Comment    quit 50 years ago     Family History:   Family History   Family history unknown: Yes       Meds/Allergies   Medications Prior to Admission   Medication    acetaminophen, FOR EMS ONLY, (TYLENOL) 160 mg/5 mL suspension    atenolol (TENORMIN) 25 mg tablet    atorvastatin (LIPITOR) 20 mg tablet    baclofen 10 mg tablet    Calcium Carb-Cholecalciferol (CALCIUM 600+D3) 600-200 MG-UNIT TABS    collagenase (SANTYL) ointment    cyanocobalamin (VITAMIN B-12) 100 MCG tablet    denosumab (PROLIA) 60 mg/mL    enalapril (VASOTEC) 20 mg tablet    ergocalciferol (VITAMIN D2) 50,000 units    gabapentin (NEURONTIN) 300 mg capsule    hydrochlorothiazide (MICROZIDE) 12 5 mg capsule    Incontinence Supply Disposable (BRIEFS OVERNIGHT MEDIUM) MISC    LORazepam (ATIVAN) 0 5 mg tablet    MAGNESIUM PO    mupirocin (BACTROBAN) 2 % ointment    naproxen (NAPROSYN) 500 mg tablet    Omega-3 Fatty Acids ([employer] FISH OIL) 1200 MG CAPS    traMADol HCl  MG CP24    traZODone (DESYREL) 100 mg tablet     Allergies   Allergen Reactions    Aspirin        Objective   First Vitals:   Blood Pressure: 141/75 (06/07/21 1946)  Pulse: 98 (06/07/21 1946)  Temperature: 98 4 °F (36 9 °C) (06/07/21 2008)  Temp Source: Oral (06/07/21 2008)  Respirations: 22 (06/07/21 1946)  Height: 5' 3" (160 cm) (06/07/21 2008)  Weight - Scale: 62 kg (136 lb 11 oz) (06/07/21 2008)  SpO2: 97 % (06/07/21 1946)    Current Vitals:   Blood Pressure: (!) 94/46 (06/09/21 0752)  Pulse: 93 (06/09/21 0752)  Temperature: 97 5 °F (36 4 °C) (06/09/21 0752)  Temp Source: Oral (06/07/21 2008)  Respirations: 18 (06/09/21 0752)  Height: 5' 3" (160 cm) (06/07/21 2008)  Weight - Scale: 62 kg (136 lb 11 oz) (06/07/21 2008)  SpO2: 93 % (06/09/21 0752)        BP (!) 94/46   Pulse 93   Temp 97 5 °F (36 4 °C)   Resp 18   Ht 5' 3" (1 6 m)   Wt 62 kg (136 lb 11 oz)   SpO2 93%   BMI 24 21 kg/m²      General Appearance:    Alert, cooperative, no distress   Head:    Normocephalic, without obvious abnormality, atraumatic   Eyes:    PERRL, conjunctiva/corneas clear, EOM's intact        Nose:   Moist mucous membranes   Neck:   Supple, symmetrical, trachea midline   Back:     Symmetric   Lungs:     Respirations unlabored   Heart:    Regular rate and rhythm, S1 and S2 normal, no murmur, rub   or gallop   Abdomen:     Soft, non-tender   Extremities: There is bilateral lower extremity weakness and stiffness  Pulses:   Pedal pulses are not palpable  There is no distal cyanosis or gangrene  There is no wound cyanosis or gangrene  There is no sign of acute evolving dysvascularity  Skin:   The skin is warm and dry  There are large ulcerations present in the anterior leg bilaterally  Right leg with mostly moist granulation tissue  Left leg with mostly moist granulation tissue but some firmly adherent yellow fibrinous exudate  There is no undermining or deep tracking  There is no pus or purulence expressed  There is no malodor  The left heel shows a small ulceration at the lateral aspect  Full-thickness ulceration about 1 cm x 0 6 cm times 0 6 cm deep  There is some filmy exudate and slough  Just distal to this wound about 5 cm away is evidence of a large bulla which apparently has recently broke open  There is some murky fluid present    Debridement of loose overlying skin reveals a central ulceration and small underlying abscess  This wound is about 1 cm x 0 5 cm times point 8 cm  Incision and drainage of excisional type with scalpel and scissors of epidermis, dermis, subcutaneous tissues and fascia to bleeding tissue  A modest amount of pus was expressed  There did not appear to be any connection between the 2 heel wounds  Deep culture swabs were taken for analysis  The wounds were irrigated and dressed with Maxorb Ag  Neurologic:   Gross sensation is preserved  Protective sensation is intact  Lab Results:   Admission on 06/07/2021   Component Date Value    WBC 06/07/2021 12 00*    RBC 06/07/2021 4 11     Hemoglobin 06/07/2021 9 0*    Hematocrit 06/07/2021 33 4*    MCV 06/07/2021 81*    MCH 06/07/2021 21 9*    MCHC 06/07/2021 26 9*    RDW 06/07/2021 17 7*    MPV 06/07/2021 10 9     Platelets 41/16/8192 654*    nRBC 06/07/2021 0     Neutrophils Relative 06/07/2021 69     Immat GRANS % 06/07/2021 2     Lymphocytes Relative 06/07/2021 10*    Monocytes Relative 06/07/2021 17*    Eosinophils Relative 06/07/2021 2     Basophils Relative 06/07/2021 0     Neutrophils Absolute 06/07/2021 8 35*    Immature Grans Absolute 06/07/2021 0 23*    Lymphocytes Absolute 06/07/2021 1 15     Monocytes Absolute 06/07/2021 2 01*    Eosinophils Absolute 06/07/2021 0 23     Basophils Absolute 06/07/2021 0 03     Sodium 06/07/2021 142     Potassium 06/07/2021 4 9     Chloride 06/07/2021 106     CO2 06/07/2021 24     ANION GAP 06/07/2021 12     BUN 06/07/2021 13     Creatinine 06/07/2021 0 74     Glucose 06/07/2021 90     Calcium 06/07/2021 8 9     eGFR 06/07/2021 78     LACTIC ACID 06/07/2021 1 3     Blood Culture 06/07/2021 No Growth at 24 hrs   Blood Culture 06/07/2021 No Growth at 24 hrs       Troponin I 06/07/2021 <0 02     NT-proBNP 06/07/2021 1,433*    Cholesterol 06/08/2021 108     Triglycerides 06/08/2021 92     HDL, Direct 06/08/2021 32*    LDL Calculated 06/08/2021 58     Iron Saturation 06/08/2021 8     TIBC 06/08/2021 186*    Iron 06/08/2021 14*    Ferritin 06/08/2021 65     WBC 06/09/2021 8 07     RBC 06/09/2021 3 67*    Hemoglobin 06/09/2021 8 1*    Hematocrit 06/09/2021 29 7*    MCV 06/09/2021 81*    MCH 06/09/2021 22 1*    MCHC 06/09/2021 27 3*    RDW 06/09/2021 17 4*    MPV 06/09/2021 11 1     Platelets 15/29/3821 586*    nRBC 06/09/2021 0     Neutrophils Relative 06/09/2021 55     Immat GRANS % 06/09/2021 2     Lymphocytes Relative 06/09/2021 14     Monocytes Relative 06/09/2021 26*    Eosinophils Relative 06/09/2021 3     Basophils Relative 06/09/2021 0     Neutrophils Absolute 06/09/2021 4 46     Immature Grans Absolute 06/09/2021 0 15     Lymphocytes Absolute 06/09/2021 1 12     Monocytes Absolute 06/09/2021 2 09*    Eosinophils Absolute 06/09/2021 0 23     Basophils Absolute 06/09/2021 0 02     Sodium 06/09/2021 141     Potassium 06/09/2021 4 1     Chloride 06/09/2021 105     CO2 06/09/2021 27     ANION GAP 06/09/2021 9     BUN 06/09/2021 13     Creatinine 06/09/2021 0 71     Glucose 06/09/2021 99     Calcium 06/09/2021 8 6     Corrected Calcium 06/09/2021 10 0     AST 06/09/2021 16     ALT 06/09/2021 12     Alkaline Phosphatase 06/09/2021 90     Total Protein 06/09/2021 6 5     Albumin 06/09/2021 2 2*    Total Bilirubin 06/09/2021 0 37     eGFR 06/09/2021 82              Results from last 7 days   Lab Units 06/07/21 2051   BLOOD CULTURE  No Growth at 24 hrs  No Growth at 24 hrs  Invalid input(s): LABAEARO            Imaging: I have personally reviewed pertinent films in PACS  EKG, Pathology, and Other Studies: I have personally reviewed pertinent reports        Code Status: Level 2 - DNAR: but accepts endotracheal intubation  Advance Directive and Living Will:      Power of :    POLST:

## 2021-06-09 NOTE — PLAN OF CARE
Problem: Potential for Falls  Goal: Patient will remain free of falls  Description: INTERVENTIONS:  - Assess patient frequently for physical needs  -  Identify cognitive and physical deficits and behaviors that affect risk of falls    -  [place] fall precautions as indicated by assessment   - Educate patient/family on patient safety including physical limitations  - Instruct patient to call for assistance with activity based on assessment  - Modify environment to reduce risk of injury  - Consider OT/PT consult to assist with strengthening/mobility  Outcome: Progressing     Problem: Prexisting or High Potential for Compromised Skin Integrity  Goal: Skin integrity is maintained or improved  Description: INTERVENTIONS:  - Identify patients at risk for skin breakdown  - Assess and monitor skin integrity  - Assess and monitor nutrition and hydration status  - Monitor labs   - Assess for incontinence   - Turn and reposition patient  - Assist with mobility/ambulation  - Relieve pressure over bony prominences  - Avoid friction and shearing  - Provide appropriate hygiene as needed including keeping skin clean and dry  - Evaluate need for skin moisturizer/barrier cream  - Collaborate with interdisciplinary team   - Patient/family teaching  - Consider wound care consult   Outcome: Progressing     Problem: PAIN - ADULT  Goal: Verbalizes/displays adequate comfort level or baseline comfort level  Description: Interventions:  - Encourage patient to monitor pain and request assistance  - Assess pain using appropriate pain scale  - Administer analgesics based on type and severity of pain and evaluate response  - Implement non-pharmacological measures as appropriate and evaluate response  - Consider cultural and social influences on pain and pain management  - Notify physician/advanced practitioner if interventions unsuccessful or patient reports new pain  Outcome: Progressing     Problem: INFECTION - ADULT  Goal: Absence or prevention of progression during hospitalization  Description: INTERVENTIONS:  - Assess and monitor for signs and symptoms of infection  - Monitor lab/diagnostic results  - Monitor all insertion sites, i e  indwelling lines, tubes, and drains  - Monitor endotracheal if appropriate and nasal secretions for changes in amount and color  - Valentines appropriate cooling/warming therapies per order  - Administer medications as ordered  - Instruct and encourage patient and family to use good hand hygiene technique  - Identify and instruct in appropriate isolation precautions for identified infection/condition  Outcome: Progressing  Goal: Absence of fever/infection during neutropenic period  Description: INTERVENTIONS:  - Monitor WBC    Outcome: Progressing     Problem: SAFETY ADULT  Goal: Patient will remain free of falls  Description: INTERVENTIONS:  - Assess patient frequently for physical needs  -  Identify cognitive and physical deficits and behaviors that affect risk of falls    -  [place] fall precautions as indicated by assessment   - Educate patient/family on patient safety including physical limitations  - Instruct patient to call for assistance with activity based on assessment  - Modify environment to reduce risk of injury  - Consider OT/PT consult to assist with strengthening/mobility  Outcome: Progressing  Goal: Maintain or return to baseline ADL function  Description: INTERVENTIONS:  -  Assess patient's ability to carry out ADLs; assess patient's baseline for ADL function and identify physical deficits which impact ability to perform ADLs (bathing, care of mouth/teeth, toileting, grooming, dressing, etc )  - Assess/evaluate cause of self-care deficits   - Assess range of motion  - Assess patient's mobility; develop plan if impaired  - Assess patient's need for assistive devices and provide as appropriate  - Encourage maximum independence but intervene and supervise when necessary  - Involve family in performance of ADLs  - Assess for home care needs following discharge   - Consider OT consult to assist with ADL evaluation and planning for discharge  - Provide patient education as appropriate  Outcome: Progressing  Goal: Maintain or return mobility status to optimal level  Description: INTERVENTIONS:  - Assess patient's baseline mobility status (ambulation, transfers, stairs, etc )    - Identify cognitive and physical deficits and behaviors that affect mobility  - Identify mobility aids required to assist with transfers and/or ambulation (gait belt, sit-to-stand, lift, walker, cane, etc )  - [place] fall precautions as indicated by assessment  - Record patient progress and toleration of activity level on Mobility SBAR; progress patient to next Phase/Stage  - Instruct patient to call for assistance with activity based on assessment  - Consider rehabilitation consult to assist with strengthening/weightbearing, etc   Outcome: Progressing     Problem: DISCHARGE PLANNING  Goal: Discharge to home or other facility with appropriate resources  Description: INTERVENTIONS:  - Identify barriers to discharge w/patient and caregiver  - Arrange for needed discharge resources and transportation as appropriate  - Identify discharge learning needs (meds, wound care, etc )  - Arrange for interpretive services to assist at discharge as needed  - Refer to Case Management Department for coordinating discharge planning if the patient needs post-hospital services based on physician/advanced practitioner order or complex needs related to functional status, cognitive ability, or social support system  Outcome: Progressing     Problem: Knowledge Deficit  Goal: Patient/family/caregiver demonstrates understanding of disease process, treatment plan, medications, and discharge instructions  Description: Complete learning assessment and assess knowledge base    Interventions:  - Provide teaching at level of understanding  - Provide teaching via preferred learning methods  Outcome: Progressing

## 2021-06-09 NOTE — PROGRESS NOTES
[address] [facility]  Progress Note - Pink Batter 1945, 67 y o  female MRN: [id number]  Unit/Bed#: -Alejandro Encounter: [id number]  Primary Care Provider: TELLY David   Date and time admitted to hospital: 6/7/2021  7:43 PM    * Cellulitis of left foot  Assessment & Plan  · Patient presented with Fluid-filled blister that appears cloudy to left lateral ankle  States first noticed on day prior to admission  Erythema and warmth surrounding to mid foot and mid-thigh  Also has chronic ulceration to left medial ankle and right medial ankle/calf with surrounding erythema  · WBC 12 00  LA 1 3  Afebrile    Plan:  Continue antibiotics, currently on cephazolin IV  Podiatry input appreciated  Monitor clinically, temperature curve, white count    Open wound of right lower leg  Assessment & Plan  · Chronic ulceration to right medial ankle/calf x 1-1 5 years  · See AP above for LLE wound    Open wound of left lower leg  Assessment & Plan  · 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 1-1 5 years  States has been following with wound care clinic as outpt, but has been unable to go x 1 month 2/2 lack of transportation  (-) hx of DM  · Wound care consult  · CM consult to assist with wound care resources available as outpt/HH  · Wound care orders placed pending wound care nurse recommendations    Microcytic anemia  Assessment & Plan  · Hb decreased at 9 0 on admission  MCV 81  · No evidence of acute bleeding  · CBC and iron panel in am    Essential (primary) hypertension  Assessment & Plan  · BP adequately controlled currently  · States is no longer taking home dose Atenolol, HCTZ or Vastec because her BP was running too low   States sis not address this with PCP prior to discontinuing her meds  · Will restart home dose HCTZ but monitor kidney function closely  · Monitor BP per unit protocol        VTE Pharmacologic Prophylaxis:   Pharmacologic: Enoxaparin (Lovenox)  Mechanical VTE Prophylaxis in Place: Yes    Patient Centered Rounds: I have performed bedside rounds with nursing staff today  Discussions with Specialists or Other Care Team Provider:  Discussed with care team    Education and Discussions with Family / Patient:  Patient    Time Spent for Care: 30 minutes  More than 50% of total time spent on counseling and coordination of care as described above  Current Length of Stay: 2 day(s)    Current Patient Status: Inpatient   Certification Statement: The patient will continue to require additional inpatient hospital stay due to Need for IV antibiotics    Discharge Plan:  Once stable    Code Status: Level 2 - DNAR: but accepts endotracheal intubation      Subjective:     Patient later this morning  She feels slightly better compared to yesterday  States that the pain lower extremities is improving degree  She just underwent evaluation by Podiatry, ACE and drainage was done at the bedside  Currently nontoxic otherwise  Hemoglobin 8 1 this morning  She denies any bleeding  Objective:     Vitals:   Temp (24hrs), Av 5 °F (36 9 °C), Min:97 5 °F (36 4 °C), Max:99 °F (37 2 °C)    Temp:  [97 5 °F (36 4 °C)-99 °F (37 2 °C)] 99 °F (37 2 °C)  HR:  [84-93] 85  Resp:  [17-18] 17  BP: ()/(46-64) 91/49  SpO2:  [93 %-98 %] 96 %  Body mass index is 24 21 kg/m²  Input and Output Summary (last 24 hours): Intake/Output Summary (Last 24 hours) at 2021 1500  Last data filed at 2021 0900  Gross per 24 hour   Intake 1290 ml   Output 250 ml   Net 1040 ml       Physical Exam:     Physical Exam  Vitals signs and nursing note reviewed  Constitutional:       Appearance: Normal appearance  She is normal weight  Comments: Elderly female in bed, awake   HENT:      Head: Normocephalic and atraumatic  Right Ear: External ear normal       Left Ear: External ear normal       Nose: Nose normal  No congestion        Mouth/Throat:      Mouth: Mucous membranes are moist       Pharynx: Oropharynx is clear  No oropharyngeal exudate or posterior oropharyngeal erythema  Eyes:      General: No scleral icterus  Right eye: No discharge  Left eye: No discharge  Extraocular Movements: Extraocular movements intact  Conjunctiva/sclera: Conjunctivae normal       Pupils: Pupils are equal, round, and reactive to light  Neck:      Musculoskeletal: Normal range of motion and neck supple  No neck rigidity or muscular tenderness  Cardiovascular:      Rate and Rhythm: Normal rate and regular rhythm  Pulses: Normal pulses  Heart sounds: Normal heart sounds  No murmur  No friction rub  No gallop  Pulmonary:      Effort: Pulmonary effort is normal  No respiratory distress  Breath sounds: Normal breath sounds  No stridor  No wheezing, rhonchi or rales  Chest:      Chest wall: No tenderness  Abdominal:      General: Abdomen is flat  Bowel sounds are normal  There is no distension  Palpations: Abdomen is soft  There is no mass  Tenderness: There is no abdominal tenderness  There is no guarding or rebound  Musculoskeletal: Normal range of motion  General: No swelling, tenderness, deformity or signs of injury  Skin:     General: Skin is warm and dry  Capillary Refill: Capillary refill takes less than 2 seconds  Coloration: Skin is not jaundiced or pale  Findings: Lesion present  No bruising, erythema or rash  Comments: Wounds in bilateral lower extremities currently dressed, area of redness particularly in the left lower extremity seems slightly improved compared to yesterday   Neurological:      General: No focal deficit present  Mental Status: She is alert and oriented to person, place, and time  Mental status is at baseline  Cranial Nerves: No cranial nerve deficit  Sensory: No sensory deficit  Motor: No weakness        Coordination: Coordination normal    Psychiatric:         Mood and Affect: Mood normal  Behavior: Behavior normal          Thought Content: Thought content normal          Judgment: Judgment normal          Additional Data:     Labs:    Results from last 7 days   Lab Units 06/09/21  0439   WBC Thousand/uL 8 07   HEMOGLOBIN g/dL 8 1*   HEMATOCRIT % 29 7*   PLATELETS Thousands/uL 586*   NEUTROS PCT % 55   LYMPHS PCT % 14   MONOS PCT % 26*   EOS PCT % 3     Results from last 7 days   Lab Units 06/09/21  0439   SODIUM mmol/L 141   POTASSIUM mmol/L 4 1   CHLORIDE mmol/L 105   CO2 mmol/L 27   BUN mg/dL 13   CREATININE mg/dL 0 71   ANION GAP mmol/L 9   CALCIUM mg/dL 8 6   ALBUMIN g/dL 2 2*   TOTAL BILIRUBIN mg/dL 0 37   ALK PHOS U/L 90   ALT U/L 12   AST U/L 16   GLUCOSE RANDOM mg/dL 99                 Results from last 7 days   Lab Units 06/07/21 2051   LACTIC ACID mmol/L 1 3           * I Have Reviewed All Lab Data Listed Above  * Additional Pertinent Lab Tests Reviewed: Kandi [address] Admission Reviewed        Recent Cultures (last 7 days):     Results from last 7 days   Lab Units 06/09/21 0822 06/07/21 2051   BLOOD CULTURE   --  No Growth at 24 hrs  No Growth at 24 hrs     GRAM STAIN RESULT  Rare Polys*  Rare Gram positive cocci in pairs*  --        Last 24 Hours Medication List:   Current Facility-Administered Medications   Medication Dose Route Frequency Provider Last Rate    acetaminophen  650 mg Oral Q4H PRN Bambi Villagomez PA-C      aluminum-magnesium hydroxide-simethicone  30 mL Oral Q6H PRN Bambi Villagomez PA-C      atorvastatin  20 mg Oral Daily With Joan Villagomez PA-C      calcium carbonate-vitamin D  1 tablet Oral Daily With Breakfast Bambi Villagomez PA-C      cefazolin  1,000 mg Intravenous Q8H Bambi Villagomez PA-C 1,000 mg (06/09/21 1422)    cyanocobalamin  1,000 mcg Oral Daily Bambi Villagomez PA-C      enoxaparin  40 mg Subcutaneous Daily Shelley Benavides PA-C      ferrous gluconate  324 mg Oral BID AC Marco Antonio Ellis MD      gabapentin  600 mg Oral HS Pretty Jackson PA-C      hydrochlorothiazide  12 5 mg Oral Daily Pretty [place], [place]      HYDROmorphone  0 2 mg Intravenous Q3H PRN Pretty Jackson PA-C      melatonin  3 mg Oral HS [place], [place]      ondansetron  4 mg Intravenous Q6H PRN Pretty [place]      oxyCODONE  2 5 mg Oral Q4H PRN Pretty [place], [place]      oxyCODONE  5 mg Oral Q4H PRN Pretty Jackson PA-C          Today, Patient Was Seen By: Johann Castillo MD    ** Please Note: Dictation voice to text software may have been used in the creation of this document   **

## 2021-06-09 NOTE — PHYSICAL THERAPY NOTE
Physical Therapy Evaluation     Patient's Name: Funmi Barney    Admitting Diagnosis  Shortness of breath [R06 02]  Leukocytosis [D72 829]  SOB (shortness of breath) [R06 02]  Anemia [D64 9]  Thrombocytosis (HCC) [D47 3]  Cellulitis of left foot [L03 116]  Generalized weakness [R53 1]  Infected ulcer of skin ([address] ) [L98 499, L08 9]    Problem List  Patient Active Problem List   Diagnosis    Hammer toe    Combined hyperlipidemia    Essential (primary) hypertension    Foot pain    IFG (impaired fasting glucose)    Senile osteoporosis    Encounter to establish care    Open wound of left lower leg    Open wound of right lower leg    Cellulitis of left foot    Microcytic anemia     Past Medical History  Past Medical History:   Diagnosis Date    Hyperlipidemia     Hypertension     Osteoporosis     Shingles      Past Surgical History  Past Surgical History:   Procedure Laterality Date    CATARACT EXTRACTION Bilateral     FOOT SURGERY Right     reconstruction    TOTAL HIP ARTHROPLASTY Bilateral       06/09/21 0952   PT Last Visit   PT Visit Date 06/09/21   Note Type   Note type Evaluation   Pain Assessment   Pain Assessment Tool 0-10   Pain Score No Pain   Home Living   Type of [address] [place] [address] One level;Stairs to enter without rails  (2 NALINI)   Bathroom Shower/Tub Walk-in shower   Bathroom Toilet Standard   Bathroom Equipment Grab bars in shower; Shower chair;Commode   [address] [facility]; Wheelchair-manual;Other (Comment)  (RW and rollator)   Additional Comments Pt performs stand pivot transfers at baseline     Prior Function   Level of [place] Independent with ADLs and functional mobility   Lives With Other (Comment)  (granddaughter)   Receives Help From Family   ADL Assistance Needs assistance   IADLs Needs assistance   Falls in the last 6 months 0  (positive fall history >1 year ago)   Vocational Retired   Restrictions/Precautions   navabi Precautions Per Order Yes   LLE Weight Bearing Per Order NWB  (maintained NWB until cleared by podiatry)   Braces or Orthoses Other (Comment)  (none per patient)   Other Precautions Bed Alarm;WBS;Fall Risk   General   Family/Caregiver Present No   Cognition   Overall Cognitive Status WFL   Arousal/Participation Alert   Orientation Level Oriented X4   Memory Within functional limits   Following Commands Follows all commands and directions without difficulty   Comments Pt agreeable to PT    RLE Assessment   RLE Assessment X   Strength RLE   RLE Overall Strength 3+/5   LLE Assessment   LLE Assessment X   Strength LLE   LLE Overall Strength 3+/5   Light Touch   RLE Light Touch Impaired   LLE Light Touch Impaired   Bed Mobility   Rolling R 4  Minimal assistance   Additional items Assist x 1;Bedrails; Increased time required;Verbal cues;LE management   Supine to Sit 4  Minimal assistance   Additional items Assist x 1;HOB elevated; Bedrails; Increased time required;Verbal cues;LE management   Sit to Supine 4  Minimal assistance   Additional items Assist x 1;HOB elevated; Bedrails; Increased time required;Verbal cues;LE management   Transfers   Sit to Stand Unable to assess   Ambulation/Elevation   Gait pattern Not appropriate; Not tested   Balance   Static Sitting Fair   Dynamic Sitting Fair -   Endurance Deficit   Endurance Deficit Yes   Activity Tolerance   Activity Tolerance Patient tolerated treatment well   Nurse Made Aware Discussed case with VINICIUS Grey; post session pt was left supine in bed in NAD, all belongings within reach, +bed alarm   Assessment   Prognosis Good   Problem List Decreased strength;Decreased endurance; Impaired balance;Decreased mobility; Impaired sensation;Decreased skin integrity  (podiatry restrictions)   Assessment Pt is 67year old female seen for PT evaluation s/p [facility] to [facility] on [facility]/7/2021 with Cellulitis of left foot  PT consulted to assess pt's functional mobility and d/c needs   Order placed for PT eval and tx, with up with assist order  Comorbidities affecting pt's physical performance at time of assessment include essential primary hypertension, open wound left and right lower leg, and microcytic anemia  PTA, pt was independent with stand pivot transfers  Personal factors affecting pt at time of IE include stairs to enter home, inability to ambulate household distances, inability to navigate community distances, inability to navigate level surfaces without external assistance, unable to perform dynamic tasks in community, positive fall history, inability to perform IADLs, and inability to perform ADLs  Please find objective findings from PT assessment regarding body systems outlined above with impairments and limitations including weakness, impaired balance, decreased endurance, inability to ambulate/perform stand pivot transfers, decreased activity tolerance, decreased functional mobility tolerance, altered sensation, fall risk, and decreased skin integrity  The following objective measures performed on IE also reveal limitations: Barthel Index: 45/100 and Modified [place]: 4 (moderate/severe disability)  Pt's clinical presentation is currently unstable/unpredictable seen in pt's presentation of need for ongoing medical management/monitoring, pt is a fall risk, and pt requires cues/assist for safety with functional mobility  Pt to benefit from continued PT tx to address deficits as defined above and maximize level of functional independent mobility and consistency  From PT/mobility standpoint, recommendation at time of d/c would be STR pending progress in order to facilitate return to PLOF  Barriers to Discharge Inaccessible home environment; Other (Comment)  (decline in functional status)   Goals   STG Expiration Date 06/19/21   Short Term Goal #1 In 7-10 days: Increase bilateral LE strength 1/2 grade to facilitate independent mobility, Perform all bed mobility tasks with close supervision to decrease caregiver burden, Increase all balance 1/2 grade to decrease risk for falls and PT to see and establish goals for transfers when appropriate   Plan   Treatment/Interventions Functional transfer training;LE strengthening/ROM; Therapeutic exercise; Endurance training;Patient/family training;Bed mobility;Continued evaluation;Spoke to nursing   PT Frequency Other (Comment)  (3-5x/wk)   Recommendation   PT Discharge Recommendation Post acute rehabilitation services   PT - OK to Discharge Yes  (when medically cleared, if to STR)   AM-PAC Basic Mobility Inpatient   Turning in Bed Without Bedrails 3   Lying on Back to Sitting on Edge of Flat Bed 3   Moving Bed to Chair 1   Standing Up From Chair 1   Walk in Room 1   Climb 3-5 Stairs 1   Basic Mobility Inpatient Raw Score 10   Turning Head Towards Sound 4   Follow Simple Instructions 4   Low Function Basic Mobility Raw Score 18   Low Function Basic Mobility Standardized Score 29 25   Modified Wetzel Scale   Modified Wetzel Scale 4   Barthel Index   Feeding 10   Bathing 0   Grooming Score 5   Dressing Score 5   Bladder Score 5   Bowels Score 10   Toilet Use Score 5   Transfers (Bed/Chair) Score 5   Mobility (Level Surface) Score 0   Stairs Score 0   Barthel Index Score 45     Long Ordonez, PT, DPT

## 2021-06-10 LAB
ANION GAP SERPL CALCULATED.3IONS-SCNC: 10 MMOL/L (ref 4–13)
BASOPHILS # BLD MANUAL: 0.13 THOUSAND/UL (ref 0–0.1)
BASOPHILS NFR MAR MANUAL: 2 % (ref 0–1)
BUN SERPL-MCNC: 11 MG/DL (ref 5–25)
CALCIUM SERPL-MCNC: 8.2 MG/DL (ref 8.3–10.1)
CHLORIDE SERPL-SCNC: 105 MMOL/L (ref 100–108)
CO2 SERPL-SCNC: 26 MMOL/L (ref 21–32)
CREAT SERPL-MCNC: 0.65 MG/DL (ref 0.6–1.3)
EOSINOPHIL # BLD MANUAL: 0.19 THOUSAND/UL (ref 0–0.4)
EOSINOPHIL NFR BLD MANUAL: 3 % (ref 0–6)
ERYTHROCYTE [DISTWIDTH] IN BLOOD BY AUTOMATED COUNT: 17.3 % (ref 11.6–15.1)
GFR SERPL CREATININE-BSD FRML MDRD: 86 ML/MIN/1.73SQ M
GLUCOSE SERPL-MCNC: 87 MG/DL (ref 65–140)
HCT VFR BLD AUTO: 29 % (ref 34.8–46.1)
HGB BLD-MCNC: 7.8 G/DL (ref 11.5–15.4)
LYMPHOCYTES # BLD AUTO: 1.14 THOUSAND/UL (ref 0.6–4.47)
LYMPHOCYTES # BLD AUTO: 18 % (ref 14–44)
MCH RBC QN AUTO: 21.7 PG (ref 26.8–34.3)
MCHC RBC AUTO-ENTMCNC: 26.9 G/DL (ref 31.4–37.4)
MCV RBC AUTO: 81 FL (ref 82–98)
MICROCYTES BLD QL AUTO: PRESENT
MONOCYTES # BLD AUTO: 1.33 THOUSAND/UL (ref 0–1.22)
MONOCYTES NFR BLD: 21 % (ref 4–12)
NEUTROPHILS # BLD MANUAL: 3.48 THOUSAND/UL (ref 1.85–7.62)
NEUTS BAND NFR BLD MANUAL: 1 % (ref 0–8)
NEUTS SEG NFR BLD AUTO: 54 % (ref 43–75)
NRBC BLD AUTO-RTO: 0 /100 WBCS
PLATELET # BLD AUTO: 550 THOUSANDS/UL (ref 149–390)
PLATELET BLD QL SMEAR: ABNORMAL
PMV BLD AUTO: 10.8 FL (ref 8.9–12.7)
POTASSIUM SERPL-SCNC: 3.9 MMOL/L (ref 3.5–5.3)
RBC # BLD AUTO: 3.59 MILLION/UL (ref 3.81–5.12)
SODIUM SERPL-SCNC: 141 MMOL/L (ref 136–145)
TOTAL CELLS COUNTED SPEC: 100
VARIANT LYMPHS # BLD AUTO: 1 %
WBC # BLD AUTO: 6.32 THOUSAND/UL (ref 4.31–10.16)

## 2021-06-10 PROCEDURE — 80048 BASIC METABOLIC PNL TOTAL CA: CPT | Performed by: INTERNAL MEDICINE

## 2021-06-10 PROCEDURE — 99232 SBSQ HOSP IP/OBS MODERATE 35: CPT | Performed by: INTERNAL MEDICINE

## 2021-06-10 PROCEDURE — 85027 COMPLETE CBC AUTOMATED: CPT | Performed by: INTERNAL MEDICINE

## 2021-06-10 PROCEDURE — 85007 BL SMEAR W/DIFF WBC COUNT: CPT | Performed by: INTERNAL MEDICINE

## 2021-06-10 RX ADMIN — ENOXAPARIN SODIUM 40 MG: 40 INJECTION SUBCUTANEOUS at 08:16

## 2021-06-10 RX ADMIN — ATORVASTATIN CALCIUM 20 MG: 20 TABLET, FILM COATED ORAL at 17:01

## 2021-06-10 RX ADMIN — FERROUS GLUCONATE 324 MG: 324 TABLET ORAL at 17:01

## 2021-06-10 RX ADMIN — CEFAZOLIN SODIUM 1000 MG: 1 SOLUTION INTRAVENOUS at 06:28

## 2021-06-10 RX ADMIN — OXYCODONE HYDROCHLORIDE 5 MG: 5 TABLET ORAL at 17:26

## 2021-06-10 RX ADMIN — Medication 1 TABLET: at 06:31

## 2021-06-10 RX ADMIN — FERROUS GLUCONATE 324 MG: 324 TABLET ORAL at 06:28

## 2021-06-10 RX ADMIN — SODIUM CHLORIDE, SODIUM GLUCONATE, SODIUM ACETATE, POTASSIUM CHLORIDE, MAGNESIUM CHLORIDE, SODIUM PHOSPHATE, DIBASIC, AND POTASSIUM PHOSPHATE 50 ML/HR: .53; .5; .37; .037; .03; .012; .00082 INJECTION, SOLUTION INTRAVENOUS at 08:17

## 2021-06-10 RX ADMIN — CYANOCOBALAMIN TAB 500 MCG 1000 MCG: 500 TAB at 08:16

## 2021-06-10 RX ADMIN — Medication 3 MG: at 23:09

## 2021-06-10 RX ADMIN — ACETAMINOPHEN 650 MG: 325 TABLET, FILM COATED ORAL at 13:34

## 2021-06-10 RX ADMIN — GABAPENTIN 600 MG: 300 CAPSULE ORAL at 23:09

## 2021-06-10 RX ADMIN — CEFAZOLIN SODIUM 1000 MG: 1 SOLUTION INTRAVENOUS at 13:34

## 2021-06-10 RX ADMIN — CEFAZOLIN SODIUM 1000 MG: 1 SOLUTION INTRAVENOUS at 23:09

## 2021-06-10 NOTE — ASSESSMENT & PLAN NOTE
· Patient presented with Fluid-filled blister that appears cloudy to left lateral ankle  States first noticed on day prior to admission  Erythema and warmth surrounding to mid foot and mid-thigh  Also has chronic ulceration to left medial ankle and right medial ankle/calf with surrounding erythema  · WBC 12 00  LA 1 3  Afebrile    Plan:  Currently slowly improving  Continue antibiotics, currently on cephazolin IV  Podiatry input appreciated - patient underwent incision and drainage of left small abscess of the heel  Waiting for cultures    Monitor clinically, temperature curve, white count

## 2021-06-10 NOTE — ASSESSMENT & PLAN NOTE
· BP adequately controlled currently  · States is no longer taking home dose Atenolol, HCTZ or Vastec because her BP was running too low   States sis not address this with PCP prior to discontinuing her meds  · Hydrochlorothiazide has been discontinued due to blood pressure on the lower side  · Monitor BP per unit protocol

## 2021-06-11 LAB
ALBUMIN SERPL BCP-MCNC: 2.2 G/DL (ref 3.5–5)
ALP SERPL-CCNC: 73 U/L (ref 46–116)
ALT SERPL W P-5'-P-CCNC: 7 U/L (ref 12–78)
ANION GAP SERPL CALCULATED.3IONS-SCNC: 9 MMOL/L (ref 4–13)
AST SERPL W P-5'-P-CCNC: 14 U/L (ref 5–45)
BACTERIA SPEC ANAEROBE CULT: NORMAL
BACTERIA WND AEROBE CULT: ABNORMAL
BILIRUB SERPL-MCNC: 0.27 MG/DL (ref 0.2–1)
BUN SERPL-MCNC: 11 MG/DL (ref 5–25)
CALCIUM ALBUM COR SERPL-MCNC: 9.7 MG/DL (ref 8.3–10.1)
CALCIUM SERPL-MCNC: 8.3 MG/DL (ref 8.3–10.1)
CHLORIDE SERPL-SCNC: 105 MMOL/L (ref 100–108)
CO2 SERPL-SCNC: 28 MMOL/L (ref 21–32)
CREAT SERPL-MCNC: 0.65 MG/DL (ref 0.6–1.3)
ERYTHROCYTE [DISTWIDTH] IN BLOOD BY AUTOMATED COUNT: 17.3 % (ref 11.6–15.1)
GFR SERPL CREATININE-BSD FRML MDRD: 86 ML/MIN/1.73SQ M
GLUCOSE SERPL-MCNC: 88 MG/DL (ref 65–140)
GRAM STN SPEC: ABNORMAL
GRAM STN SPEC: ABNORMAL
HCT VFR BLD AUTO: 29.7 % (ref 34.8–46.1)
HGB BLD-MCNC: 8 G/DL (ref 11.5–15.4)
MCH RBC QN AUTO: 21.9 PG (ref 26.8–34.3)
MCHC RBC AUTO-ENTMCNC: 26.9 G/DL (ref 31.4–37.4)
MCV RBC AUTO: 81 FL (ref 82–98)
NRBC BLD AUTO-RTO: 0 /100 WBCS
PLATELET # BLD AUTO: 600 THOUSANDS/UL (ref 149–390)
PMV BLD AUTO: 10.7 FL (ref 8.9–12.7)
POTASSIUM SERPL-SCNC: 4.1 MMOL/L (ref 3.5–5.3)
PROT SERPL-MCNC: 6.6 G/DL (ref 6.4–8.2)
RBC # BLD AUTO: 3.66 MILLION/UL (ref 3.81–5.12)
SODIUM SERPL-SCNC: 142 MMOL/L (ref 136–145)
WBC # BLD AUTO: 7.56 THOUSAND/UL (ref 4.31–10.16)

## 2021-06-11 PROCEDURE — 80053 COMPREHEN METABOLIC PANEL: CPT | Performed by: INTERNAL MEDICINE

## 2021-06-11 PROCEDURE — 85027 COMPLETE CBC AUTOMATED: CPT | Performed by: INTERNAL MEDICINE

## 2021-06-11 PROCEDURE — 99232 SBSQ HOSP IP/OBS MODERATE 35: CPT | Performed by: INTERNAL MEDICINE

## 2021-06-11 RX ORDER — DOXYCYCLINE HYCLATE 100 MG/1
100 CAPSULE ORAL EVERY 12 HOURS SCHEDULED
Status: DISCONTINUED | OUTPATIENT
Start: 2021-06-11 | End: 2021-06-14 | Stop reason: HOSPADM

## 2021-06-11 RX ADMIN — CEFAZOLIN SODIUM 1000 MG: 1 SOLUTION INTRAVENOUS at 06:10

## 2021-06-11 RX ADMIN — FERROUS GLUCONATE 324 MG: 324 TABLET ORAL at 17:27

## 2021-06-11 RX ADMIN — FERROUS GLUCONATE 324 MG: 324 TABLET ORAL at 07:48

## 2021-06-11 RX ADMIN — CYANOCOBALAMIN TAB 500 MCG 1000 MCG: 500 TAB at 09:24

## 2021-06-11 RX ADMIN — GABAPENTIN 600 MG: 300 CAPSULE ORAL at 22:33

## 2021-06-11 RX ADMIN — SODIUM CHLORIDE, SODIUM GLUCONATE, SODIUM ACETATE, POTASSIUM CHLORIDE, MAGNESIUM CHLORIDE, SODIUM PHOSPHATE, DIBASIC, AND POTASSIUM PHOSPHATE 50 ML/HR: .53; .5; .37; .037; .03; .012; .00082 INJECTION, SOLUTION INTRAVENOUS at 06:10

## 2021-06-11 RX ADMIN — ACETAMINOPHEN 325 MG: 325 TABLET, FILM COATED ORAL at 12:39

## 2021-06-11 RX ADMIN — DOXYCYCLINE 100 MG: 100 CAPSULE ORAL at 22:33

## 2021-06-11 RX ADMIN — Medication 1 TABLET: at 07:47

## 2021-06-11 RX ADMIN — DOXYCYCLINE 100 MG: 100 CAPSULE ORAL at 13:45

## 2021-06-11 RX ADMIN — Medication 3 MG: at 22:33

## 2021-06-11 RX ADMIN — ATORVASTATIN CALCIUM 20 MG: 20 TABLET, FILM COATED ORAL at 17:27

## 2021-06-11 RX ADMIN — ENOXAPARIN SODIUM 40 MG: 40 INJECTION SUBCUTANEOUS at 09:24

## 2021-06-11 NOTE — PLAN OF CARE
Problem: Potential for Falls  Goal: Patient will remain free of falls  Description: INTERVENTIONS:  - Assess patient frequently for physical needs  -  Identify cognitive and physical deficits and behaviors that affect risk of falls    -  [place] fall precautions as indicated by assessment   - Educate patient/family on patient safety including physical limitations  - Instruct patient to call for assistance with activity based on assessment  - Modify environment to reduce risk of injury  - Consider OT/PT consult to assist with strengthening/mobility  Outcome: Progressing     Problem: Prexisting or High Potential for Compromised Skin Integrity  Goal: Skin integrity is maintained or improved  Description: INTERVENTIONS:  - Identify patients at risk for skin breakdown  - Assess and monitor skin integrity  - Assess and monitor nutrition and hydration status  - Monitor labs   - Assess for incontinence   - Turn and reposition patient  - Assist with mobility/ambulation  - Relieve pressure over bony prominences  - Avoid friction and shearing  - Provide appropriate hygiene as needed including keeping skin clean and dry  - Evaluate need for skin moisturizer/barrier cream  - Collaborate with interdisciplinary team   - Patient/family teaching  - Consider wound care consult   Outcome: Progressing     Problem: PAIN - ADULT  Goal: Verbalizes/displays adequate comfort level or baseline comfort level  Description: Interventions:  - Encourage patient to monitor pain and request assistance  - Assess pain using appropriate pain scale  - Administer analgesics based on type and severity of pain and evaluate response  - Implement non-pharmacological measures as appropriate and evaluate response  - Consider cultural and social influences on pain and pain management  - Notify physician/advanced practitioner if interventions unsuccessful or patient reports new pain  Outcome: Progressing     Problem: INFECTION - ADULT  Goal: Absence or prevention of progression during hospitalization  Description: INTERVENTIONS:  - Assess and monitor for signs and symptoms of infection  - Monitor lab/diagnostic results  - Monitor all insertion sites, i e  indwelling lines, tubes, and drains  - Monitor endotracheal if appropriate and nasal secretions for changes in amount and color  - [place] appropriate cooling/warming therapies per order  - Administer medications as ordered  - Instruct and encourage patient and family to use good hand hygiene technique  - Identify and instruct in appropriate isolation precautions for identified infection/condition  Outcome: Progressing  Goal: Absence of fever/infection during neutropenic period  Description: INTERVENTIONS:  - Monitor WBC    Outcome: Progressing     Problem: SAFETY ADULT  Goal: Patient will remain free of falls  Description: INTERVENTIONS:  - Assess patient frequently for physical needs  -  Identify cognitive and physical deficits and behaviors that affect risk of falls    -  [place] fall precautions as indicated by assessment   - Educate patient/family on patient safety including physical limitations  - Instruct patient to call for assistance with activity based on assessment  - Modify environment to reduce risk of injury  - Consider OT/PT consult to assist with strengthening/mobility  Outcome: Progressing  Goal: Maintain or return to baseline ADL function  Description: INTERVENTIONS:  -  Assess patient's ability to carry out ADLs; assess patient's baseline for ADL function and identify physical deficits which impact ability to perform ADLs (bathing, care of mouth/teeth, toileting, grooming, dressing, etc )  - Assess/evaluate cause of self-care deficits   - Assess range of motion  - Assess patient's mobility; develop plan if impaired  - Assess patient's need for assistive devices and provide as appropriate  - Encourage maximum independence but intervene and supervise when necessary  - Involve family in performance of ADLs  - Assess for home care needs following discharge   - Consider OT consult to assist with ADL evaluation and planning for discharge  - Provide patient education as appropriate  Outcome: Progressing  Goal: Maintain or return mobility status to optimal level  Description: INTERVENTIONS:  - Assess patient's baseline mobility status (ambulation, transfers, stairs, etc )    - Identify cognitive and physical deficits and behaviors that affect mobility  - Identify mobility aids required to assist with transfers and/or ambulation (gait belt, sit-to-stand, lift, walker, cane, etc )  - [place] fall precautions as indicated by assessment  - Record patient progress and toleration of activity level on Mobility SBAR; progress patient to next Phase/Stage  - Instruct patient to call for assistance with activity based on assessment  - Consider rehabilitation consult to assist with strengthening/weightbearing, etc   Outcome: Progressing     Problem: DISCHARGE PLANNING  Goal: Discharge to home or other facility with appropriate resources  Description: INTERVENTIONS:  - Identify barriers to discharge w/patient and caregiver  - Arrange for needed discharge resources and transportation as appropriate  - Identify discharge learning needs (meds, wound care, etc )  - Arrange for interpretive services to assist at discharge as needed  - Refer to Case Management Department for coordinating discharge planning if the patient needs post-hospital services based on physician/advanced practitioner order or complex needs related to functional status, cognitive ability, or social support system  Outcome: Progressing     Problem: Knowledge Deficit  Goal: Patient/family/caregiver demonstrates understanding of disease process, treatment plan, medications, and discharge instructions  Description: Complete learning assessment and assess knowledge base    Interventions:  - Provide teaching at level of understanding  - Provide teaching via preferred learning methods  Outcome: Progressing

## 2021-06-11 NOTE — PROGRESS NOTES
[address] [facility]  Progress Note - Phoebe Blend 1945, 67 y o  female MRN: [id number]  Unit/Bed#: -Alejandro Encounter: [id number]  Primary Care Provider: TELLY Zeng   Date and time admitted to hospital: 6/7/2021  7:43 PM    * Cellulitis of left foot  Assessment & Plan  · Patient presented with Fluid-filled blister that appears cloudy to left lateral ankle  States first noticed on day prior to admission  Erythema and warmth surrounding to mid foot and mid-thigh  Also has chronic ulceration to left medial ankle and right medial ankle/calf with surrounding erythema  · WBC 12 00  LA 1 3  Afebrile    Plan:  Currently slowly improving  On cephazolin IV wait for final culture results   Podiatry input appreciated - patient underwent incision and drainage of left small abscess of the heel  Monitor clinically, temperature curve, white count    Open wound of right lower leg  Assessment & Plan  · Chronic ulceration to right medial ankle/calf x 1-1 5 years  · See AP above for LLE wound    Open wound of left lower leg  Assessment & Plan  · 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 1-1 5 years  States has been following with wound care clinic as outpt, but has been unable to go x 1 month 2/2 lack of transportation  (-) hx of DM  · Wound care consult  · CM consult to assist with wound care resources available as outpt/HH  · Wound care orders placed pending wound care nurse recommendations    Microcytic anemia  Assessment & Plan  · Hb decreased at 9 0 on admission  MCV 81  · No evidence of acute bleeding  · CBC and iron panel in am    Essential (primary) hypertension  Assessment & Plan  · BP adequately controlled currently  · States is no longer taking home dose Atenolol, HCTZ or Vastec because her BP was running too low   States sis not address this with PCP prior to discontinuing her meds  · Hydrochlorothiazide has been discontinued due to blood pressure on the lower side  · Monitor BP per unit protocol    VTE Pharmacologic Prophylaxis:   Pharmacologic: Enoxaparin (Lovenox)  Mechanical VTE Prophylaxis in Place: Yes    Patient Centered Rounds: I have performed bedside rounds with nursing staff today  Discussions with Specialists or Other Care Team Provider:  Discussed with patient    Education and Discussions with Family / Patient:  Patient, she is updating the family herself    Time Spent for Care: 30 minutes  More than 50% of total time spent on counseling and coordination of care as described above  Current Length of Stay: 4 day(s)    Current Patient Status: Inpatient   Certification Statement: The patient will continue to require additional inpatient hospital stay due to Need for therapy    Discharge Plan:  Once stable    Code Status: Level 2 - DNAR: but accepts endotracheal intubation      Subjective:     Patient evaluated this morning  She does mention improvement in her lower extremities but not completely back to baseline yet  She denies any fever, hemodynamically stable  No other events reported  Objective:     Vitals:   Temp (24hrs), Av 4 °F (36 9 °C), Min:98 2 °F (36 8 °C), Max:98 6 °F (37 °C)    Temp:  [98 2 °F (36 8 °C)-98 6 °F (37 °C)] 98 2 °F (36 8 °C)  HR:  [72-75] 74  Resp:  [18-19] 19  BP: (102-108)/(59-71) 105/61  SpO2:  [95 %-96 %] 96 %  Body mass index is 24 21 kg/m²  Input and Output Summary (last 24 hours): Intake/Output Summary (Last 24 hours) at 2021 1257  Last data filed at 2021 1202  Gross per 24 hour   Intake 480 ml   Output 3350 ml   Net -2870 ml       Physical Exam:     Physical Exam  Vitals signs and nursing note reviewed  Constitutional:       General: She is not in acute distress  Appearance: Normal appearance  She is normal weight  She is not ill-appearing, toxic-appearing or diaphoretic  Comments: Elderly female in bed, awake   HENT:      Head: Normocephalic and atraumatic        Right Ear: External ear normal       Left Ear: External ear normal       Nose: Nose normal  No congestion  Mouth/Throat:      Mouth: Mucous membranes are moist       Pharynx: Oropharynx is clear  No oropharyngeal exudate or posterior oropharyngeal erythema  Eyes:      General: No scleral icterus  Right eye: No discharge  Left eye: No discharge  Extraocular Movements: Extraocular movements intact  Conjunctiva/sclera: Conjunctivae normal       Pupils: Pupils are equal, round, and reactive to light  Neck:      Musculoskeletal: Normal range of motion and neck supple  No neck rigidity or muscular tenderness  Cardiovascular:      Rate and Rhythm: Normal rate and regular rhythm  Pulses: Normal pulses  Heart sounds: Normal heart sounds  No murmur  No friction rub  No gallop  Pulmonary:      Effort: Pulmonary effort is normal  No respiratory distress  Breath sounds: Normal breath sounds  No stridor  No wheezing, rhonchi or rales  Chest:      Chest wall: No tenderness  Abdominal:      General: Abdomen is flat  Bowel sounds are normal  There is no distension  Palpations: Abdomen is soft  There is no mass  Tenderness: There is no abdominal tenderness  There is no guarding or rebound  Musculoskeletal: Normal range of motion  General: No swelling, tenderness, deformity or signs of injury  Skin:     General: Skin is warm and dry  Capillary Refill: Capillary refill takes less than 2 seconds  Coloration: Skin is not jaundiced or pale  Findings: No bruising, erythema, lesion or rash  Comments: Again noted Wounds in bilateral lower extremities currently dressed, area of redness particularly in the left lower extremity since significantly improved at this point   Neurological:      General: No focal deficit present  Mental Status: She is alert and oriented to person, place, and time  Mental status is at baseline  Cranial Nerves: No cranial nerve deficit        Sensory: No sensory deficit  Motor: No weakness  Coordination: Coordination normal    Psychiatric:         Mood and Affect: Mood normal          Behavior: Behavior normal          Thought Content: Thought content normal          Judgment: Judgment normal          Additional Data:     Labs:    Results from last 7 days   Lab Units 06/11/21  0614 06/10/21  0532 06/09/21  0439   WBC Thousand/uL 7 56 6 32 8 07   HEMOGLOBIN g/dL 8 0* 7 8* 8 1*   HEMATOCRIT % 29 7* 29 0* 29 7*   PLATELETS Thousands/uL 600* 550* 586*   BANDS PCT %  --  1  --    NEUTROS PCT %  --   --  55   LYMPHS PCT %  --   --  14   LYMPHO PCT %  --  18  --    MONOS PCT %  --   --  26*   MONO PCT %  --  21*  --    EOS PCT %  --  3 3     Results from last 7 days   Lab Units 06/11/21  0614   SODIUM mmol/L 142   POTASSIUM mmol/L 4 1   CHLORIDE mmol/L 105   CO2 mmol/L 28   BUN mg/dL 11   CREATININE mg/dL 0 65   ANION GAP mmol/L 9   CALCIUM mg/dL 8 3   ALBUMIN g/dL 2 2*   TOTAL BILIRUBIN mg/dL 0 27   ALK PHOS U/L 73   ALT U/L 7*   AST U/L 14   GLUCOSE RANDOM mg/dL 88                 Results from last 7 days   Lab Units 06/07/21 2051   LACTIC ACID mmol/L 1 3           * I Have Reviewed All Lab Data Listed Above  * Additional Pertinent Lab Tests Reviewed: Kandi [address] Admission Reviewed      Recent Cultures (last 7 days):     Results from last 7 days   Lab Units 06/09/21  0822 06/07/21 2051   BLOOD CULTURE   --  No Growth at 72 hrs  No Growth at 72 hrs     GRAM STAIN RESULT  Rare Polys*  Rare Gram positive cocci in pairs*  --    WOUND CULTURE  2+ Growth of Methicillin Resistant Staphylococcus aureus*  --        Last 24 Hours Medication List:   Current Facility-Administered Medications   Medication Dose Route Frequency Provider Last Rate    acetaminophen  650 mg Oral Q4H PRN Mendel Mallick, PA-C      aluminum-magnesium hydroxide-simethicone  30 mL Oral Q6H PRN Mendel Mallick, PA-C      atorvastatin  20 mg Oral Daily With Mob[employer] M Khoi Huff PA-C      calcium carbonate-vitamin D  1 tablet Oral Daily With Breakfast Trevon Correa PA-C      cyanocobalamin  1,000 mcg Oral Daily [place], [place]      doxycycline hyclate  100 mg Oral Q12H [address] Gela Paniagua MD      enoxaparin  40 mg Subcutaneous Daily [place], [place]      ferrous gluconate  324 mg Oral BID AC Gela Paniagua MD      gabapentin  600 mg Oral HS [place], [place]      HYDROmorphone  0 2 mg Intravenous Q3H PRN Trevon Correa PA-C      melatonin  3 mg Oral HS [place], [place]      multi-electrolyte  50 mL/hr Intravenous Continuous Gela Paniagua MD 50 mL/hr (06/11/21 0610)    ondansetron  4 mg Intravenous Q6H PRN Trevon Correa PA-C      oxyCODONE  2 5 mg Oral Q4H PRN Trevon Correa PA-C      oxyCODONE  5 mg Oral Q4H PRN Trevon Correa PA-C          Today, Patient Was Seen By: Gela Paniagua MD    ** Please Note: Dictation voice to text software may have been used in the creation of this document   **

## 2021-06-11 NOTE — ASSESSMENT & PLAN NOTE
· Patient presented with Fluid-filled blister that appears cloudy to left lateral ankle  States first noticed on day prior to admission  Erythema and warmth surrounding to mid foot and mid-thigh  Also has chronic ulceration to left medial ankle and right medial ankle/calf with surrounding erythema  · WBC 12 00  LA 1 3  Afebrile    Plan:  Currently slowly improving    On cephazolin IV wait for final culture results   Podiatry input appreciated - patient underwent incision and drainage of left small abscess of the heel  Monitor clinically, temperature curve, white count

## 2021-06-11 NOTE — PLAN OF CARE
Problem: Potential for Falls  Goal: Patient will remain free of falls  Description: INTERVENTIONS:  - Assess patient frequently for physical needs  -  Identify cognitive and physical deficits and behaviors that affect risk of falls    -  [place] fall precautions as indicated by assessment   - Educate patient/family on patient safety including physical limitations  - Instruct patient to call for assistance with activity based on assessment  - Modify environment to reduce risk of injury  - Consider OT/PT consult to assist with strengthening/mobility  Outcome: Progressing     Problem: Prexisting or High Potential for Compromised Skin Integrity  Goal: Skin integrity is maintained or improved  Description: INTERVENTIONS:  - Identify patients at risk for skin breakdown  - Assess and monitor skin integrity  - Assess and monitor nutrition and hydration status  - Monitor labs   - Assess for incontinence   - Turn and reposition patient  - Assist with mobility/ambulation  - Relieve pressure over bony prominences  - Avoid friction and shearing  - Provide appropriate hygiene as needed including keeping skin clean and dry  - Evaluate need for skin moisturizer/barrier cream  - Collaborate with interdisciplinary team   - Patient/family teaching  - Consider wound care consult   Outcome: Progressing     Problem: PAIN - ADULT  Goal: Verbalizes/displays adequate comfort level or baseline comfort level  Description: Interventions:  - Encourage patient to monitor pain and request assistance  - Assess pain using appropriate pain scale  - Administer analgesics based on type and severity of pain and evaluate response  - Implement non-pharmacological measures as appropriate and evaluate response  - Consider cultural and social influences on pain and pain management  - Notify physician/advanced practitioner if interventions unsuccessful or patient reports new pain  Outcome: Progressing     Problem: INFECTION - ADULT  Goal: Absence or prevention of progression during hospitalization  Description: INTERVENTIONS:  - Assess and monitor for signs and symptoms of infection  - Monitor lab/diagnostic results  - Monitor all insertion sites, i e  indwelling lines, tubes, and drains  - Monitor endotracheal if appropriate and nasal secretions for changes in amount and color  - [place] appropriate cooling/warming therapies per order  - Administer medications as ordered  - Instruct and encourage patient and family to use good hand hygiene technique  - Identify and instruct in appropriate isolation precautions for identified infection/condition  Outcome: Progressing  Goal: Absence of fever/infection during neutropenic period  Description: INTERVENTIONS:  - Monitor WBC    Outcome: Progressing     Problem: SAFETY ADULT  Goal: Patient will remain free of falls  Description: INTERVENTIONS:  - Assess patient frequently for physical needs  -  Identify cognitive and physical deficits and behaviors that affect risk of falls    -  [place] fall precautions as indicated by assessment   - Educate patient/family on patient safety including physical limitations  - Instruct patient to call for assistance with activity based on assessment  - Modify environment to reduce risk of injury  - Consider OT/PT consult to assist with strengthening/mobility  Outcome: Progressing  Goal: Maintain or return to baseline ADL function  Description: INTERVENTIONS:  -  Assess patient's ability to carry out ADLs; assess patient's baseline for ADL function and identify physical deficits which impact ability to perform ADLs (bathing, care of mouth/teeth, toileting, grooming, dressing, etc )  - Assess/evaluate cause of self-care deficits   - Assess range of motion  - Assess patient's mobility; develop plan if impaired  - Assess patient's need for assistive devices and provide as appropriate  - Encourage maximum independence but intervene and supervise when necessary  - Involve family in performance of ADLs  - Assess for home care needs following discharge   - Consider OT consult to assist with ADL evaluation and planning for discharge  - Provide patient education as appropriate  Outcome: Progressing  Goal: Maintain or return mobility status to optimal level  Description: INTERVENTIONS:  - Assess patient's baseline mobility status (ambulation, transfers, stairs, etc )    - Identify cognitive and physical deficits and behaviors that affect mobility  - Identify mobility aids required to assist with transfers and/or ambulation (gait belt, sit-to-stand, lift, walker, cane, etc )  - [place] fall precautions as indicated by assessment  - Record patient progress and toleration of activity level on Mobility SBAR; progress patient to next Phase/Stage  - Instruct patient to call for assistance with activity based on assessment  - Consider rehabilitation consult to assist with strengthening/weightbearing, etc   Outcome: Progressing     Problem: DISCHARGE PLANNING  Goal: Discharge to home or other facility with appropriate resources  Description: INTERVENTIONS:  - Identify barriers to discharge w/patient and caregiver  - Arrange for needed discharge resources and transportation as appropriate  - Identify discharge learning needs (meds, wound care, etc )  - Arrange for interpretive services to assist at discharge as needed  - Refer to Case Management Department for coordinating discharge planning if the patient needs post-hospital services based on physician/advanced practitioner order or complex needs related to functional status, cognitive ability, or social support system  Outcome: Progressing     Problem: Knowledge Deficit  Goal: Patient/family/caregiver demonstrates understanding of disease process, treatment plan, medications, and discharge instructions  Description: Complete learning assessment and assess knowledge base    Interventions:  - Provide teaching at level of understanding  - Provide teaching via preferred learning methods  Outcome: Progressing     Problem: Nutrition/Hydration-ADULT  Goal: Nutrient/Hydration intake appropriate for improving, restoring or maintaining nutritional needs  Description: Monitor and assess patient's nutrition/hydration status for malnutrition  Collaborate with interdisciplinary team and initiate plan and interventions as ordered  Monitor patient's weight and dietary intake as ordered or per policy  Utilize nutrition screening tool and intervene as necessary  Determine patient's food preferences and provide high-protein, high-caloric foods as appropriate       INTERVENTIONS:  - Monitor oral intake, urinary output, labs, and treatment plans  - Assess nutrition and hydration status and recommend course of action  - Evaluate amount of meals eaten  - Assist patient with eating if necessary   - Allow adequate time for meals  - Recommend/ encourage appropriate diets, oral nutritional supplements, and vitamin/mineral supplements  - Order, calculate, and assess calorie counts as needed  - Recommend, monitor, and adjust tube feedings and TPN/PPN based on assessed needs  - Assess need for intravenous fluids  - Provide specific nutrition/hydration education as appropriate  - Include patient/family/caregiver in decisions related to nutrition  Outcome: Progressing

## 2021-06-11 NOTE — CASE MANAGEMENT
IMM reviewed w/ Jae Garcia ([facility])   [phone] (H)  COPY left at bedside  IMM filed in medical records bin

## 2021-06-11 NOTE — CASE MANAGEMENT
[facility] AT [facility] 6/16  avs updated

## 2021-06-12 ENCOUNTER — IMMUNIZATIONS (INPATIENT)
Dept: FAMILY MEDICINE CLINIC | Facility: HOSPITAL | Age: 76
DRG: 580 | End: 2021-06-12
Payer: COMMERCIAL

## 2021-06-12 DIAGNOSIS — Z23 ENCOUNTER FOR IMMUNIZATION: Primary | ICD-10-CM

## 2021-06-12 LAB
ANION GAP SERPL CALCULATED.3IONS-SCNC: 8 MMOL/L (ref 4–13)
BUN SERPL-MCNC: 11 MG/DL (ref 5–25)
CALCIUM SERPL-MCNC: 8.5 MG/DL (ref 8.3–10.1)
CHLORIDE SERPL-SCNC: 106 MMOL/L (ref 100–108)
CO2 SERPL-SCNC: 28 MMOL/L (ref 21–32)
CREAT SERPL-MCNC: 0.71 MG/DL (ref 0.6–1.3)
ERYTHROCYTE [DISTWIDTH] IN BLOOD BY AUTOMATED COUNT: 17.2 % (ref 11.6–15.1)
GFR SERPL CREATININE-BSD FRML MDRD: 82 ML/MIN/1.73SQ M
GLUCOSE SERPL-MCNC: 95 MG/DL (ref 65–140)
HCT VFR BLD AUTO: 29.5 % (ref 34.8–46.1)
HGB BLD-MCNC: 8 G/DL (ref 11.5–15.4)
MCH RBC QN AUTO: 22 PG (ref 26.8–34.3)
MCHC RBC AUTO-ENTMCNC: 27.1 G/DL (ref 31.4–37.4)
MCV RBC AUTO: 81 FL (ref 82–98)
NRBC BLD AUTO-RTO: 0 /100 WBCS
PLATELET # BLD AUTO: 638 THOUSANDS/UL (ref 149–390)
PMV BLD AUTO: 11 FL (ref 8.9–12.7)
POTASSIUM SERPL-SCNC: 4.2 MMOL/L (ref 3.5–5.3)
RBC # BLD AUTO: 3.63 MILLION/UL (ref 3.81–5.12)
SODIUM SERPL-SCNC: 142 MMOL/L (ref 136–145)
WBC # BLD AUTO: 9.05 THOUSAND/UL (ref 4.31–10.16)

## 2021-06-12 PROCEDURE — 85027 COMPLETE CBC AUTOMATED: CPT | Performed by: INTERNAL MEDICINE

## 2021-06-12 PROCEDURE — 91300 SARS-COV-2 / COVID-19 MRNA VACCINE (PFIZER-BIONTECH) 30 MCG: CPT

## 2021-06-12 PROCEDURE — 0001A SARS-COV-2 / COVID-19 MRNA VACCINE (PFIZER-BIONTECH) 30 MCG: CPT

## 2021-06-12 PROCEDURE — 80048 BASIC METABOLIC PNL TOTAL CA: CPT | Performed by: INTERNAL MEDICINE

## 2021-06-12 PROCEDURE — 99232 SBSQ HOSP IP/OBS MODERATE 35: CPT | Performed by: INTERNAL MEDICINE

## 2021-06-12 RX ADMIN — SODIUM CHLORIDE, SODIUM GLUCONATE, SODIUM ACETATE, POTASSIUM CHLORIDE, MAGNESIUM CHLORIDE, SODIUM PHOSPHATE, DIBASIC, AND POTASSIUM PHOSPHATE 50 ML/HR: .53; .5; .37; .037; .03; .012; .00082 INJECTION, SOLUTION INTRAVENOUS at 04:14

## 2021-06-12 RX ADMIN — GABAPENTIN 600 MG: 300 CAPSULE ORAL at 21:17

## 2021-06-12 RX ADMIN — Medication 1 TABLET: at 07:22

## 2021-06-12 RX ADMIN — FERROUS GLUCONATE 324 MG: 324 TABLET ORAL at 15:26

## 2021-06-12 RX ADMIN — DOXYCYCLINE 100 MG: 100 CAPSULE ORAL at 21:16

## 2021-06-12 RX ADMIN — ENOXAPARIN SODIUM 40 MG: 40 INJECTION SUBCUTANEOUS at 08:15

## 2021-06-12 RX ADMIN — Medication 3 MG: at 21:17

## 2021-06-12 RX ADMIN — MUPIROCIN 1 APPLICATION: 20 OINTMENT TOPICAL at 14:04

## 2021-06-12 RX ADMIN — FERROUS GLUCONATE 324 MG: 324 TABLET ORAL at 07:21

## 2021-06-12 RX ADMIN — CYANOCOBALAMIN TAB 500 MCG 1000 MCG: 500 TAB at 08:15

## 2021-06-12 RX ADMIN — MUPIROCIN 1 APPLICATION: 20 OINTMENT TOPICAL at 17:26

## 2021-06-12 RX ADMIN — ACETAMINOPHEN 650 MG: 325 TABLET, FILM COATED ORAL at 15:26

## 2021-06-12 RX ADMIN — ATORVASTATIN CALCIUM 20 MG: 20 TABLET, FILM COATED ORAL at 15:36

## 2021-06-12 RX ADMIN — DOXYCYCLINE 100 MG: 100 CAPSULE ORAL at 08:14

## 2021-06-12 NOTE — ASSESSMENT & PLAN NOTE
· Patient presented with Fluid-filled blister that appears cloudy to left lateral ankle  States first noticed on day prior to admission  Erythema and warmth surrounding to mid foot and mid-thigh  Also has chronic ulceration to left medial ankle and right medial ankle/calf with surrounding erythema  · WBC 12 00  LA 1 3  Afebrile    Plan:  Currently slowly improving  Podiatry input appreciated - patient underwent incision and drainage of left small abscess of the heel  On cephazolin IV initially, culture growing MRSA, switched to doxycycline  Monitor clinically, temperature curve, white count    Patients mobility is pretty poor, she has been recommended rehab  However she then told me she wants to go home with home VNA  I told her that she needs to do better if she wants to go home since she has not moved from the bed recently I am not sure if she is steady enough to go home at all  Will re-consult PT/OT to reassess her status

## 2021-06-12 NOTE — PROGRESS NOTES
[address] [place] [address]  Progress Note - Brenden Lyon 1945, 67 y o  female MRN: [id number]  Unit/Bed#: -Alejandro Encounter: [id number]  Primary Care Provider: TELLY Chang   Date and time admitted to hospital: 6/7/2021  7:43 PM    * Cellulitis of left foot  Assessment & Plan  · Patient presented with Fluid-filled blister that appears cloudy to left lateral ankle  States first noticed on day prior to admission  Erythema and warmth surrounding to mid foot and mid-thigh  Also has chronic ulceration to left medial ankle and right medial ankle/calf with surrounding erythema  · WBC 12 00  LA 1 3  Afebrile    Plan:  Currently slowly improving  Podiatry input appreciated - patient underwent incision and drainage of left small abscess of the heel  On cephazolin IV initially, culture growing MRSA, switched to doxycycline  Monitor clinically, temperature curve, white count    Patients mobility is pretty poor, she has been recommended rehab  However she then told me she wants to go home with home VNA  I told her that she needs to do better if she wants to go home since she has not moved from the bed recently I am not sure if she is steady enough to go home at all  Will re-consult PT/OT to reassess her status  Open wound of right lower leg  Assessment & Plan  · Chronic ulceration to right medial ankle/calf x 1-1 5 years  · See AP above for LLE wound    Open wound of left lower leg  Assessment & Plan  · 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 1-1 5 years  States has been following with wound care clinic as outpt, but has been unable to go x 1 month 2/2 lack of transportation  (-) hx of DM  · Wound care consult  · CM consult to assist with wound care resources available as outpt/HH  · Wound care on board  Microcytic anemia  Assessment & Plan  · Hb decreased at 9 0 on admission   MCV 81  · No evidence of acute bleeding  · CBC and iron panel in am    Essential (primary) hypertension  Assessment & Plan  · BP adequately controlled currently  · States is no longer taking home dose Atenolol, HCTZ or Vastec because her BP was running too low  States sis not address this with PCP prior to discontinuing her meds  · Hydrochlorothiazide has been discontinued due to blood pressure on the lower side  · Monitor BP per unit protocol    VTE Pharmacologic Prophylaxis:   Pharmacologic: Enoxaparin (Lovenox)  Mechanical VTE Prophylaxis in Place: Yes    Patient Centered Rounds: I have performed bedside rounds with nursing staff today  Discussions with Specialists or Other Care Team Provider: Team    Education and Discussions with Family / Patient: Patient  Tried to call phone in chart, no one picked up    Time Spent for Care: 30 minutes  More than 50% of total time spent on counseling and coordination of care as described above  Current Length of Stay: 5 day(s)    Current Patient Status: Inpatient   Certification Statement: The patient will continue to require additional inpatient hospital stay due to Need for therapy    Discharge Plan: Once stable    Code Status: Level 2 - DNAR: but accepts endotracheal intubation      Subjective:     Patient evaluated this morning  She denies any nausea vomiting, diarrhea constipation  , afebrile, nontoxic  She wants to go home with visiting nurse however she has moved out of the bed yet  Objective:     Vitals:   Temp (24hrs), Av 7 °F (36 5 °C), Min:97 6 °F (36 4 °C), Max:97 7 °F (36 5 °C)    Temp:  [97 6 °F (36 4 °C)-97 7 °F (36 5 °C)] 97 7 °F (36 5 °C)  HR:  [69-75] 74  Resp:  [16-18] 16  BP: (104-133)/(58-71) 132/71  SpO2:  [95 %-96 %] 95 %  Body mass index is 24 21 kg/m²  Input and Output Summary (last 24 hours): Intake/Output Summary (Last 24 hours) at 2021 1142  Last data filed at 2021 2602  Gross per 24 hour   Intake 240 ml   Output 2250 ml   Net -2010 ml       Physical Exam:     Physical Exam  Vitals signs and nursing note reviewed  Constitutional:       General: She is not in acute distress  Appearance: Normal appearance  She is normal weight  She is not ill-appearing, toxic-appearing or diaphoretic  Comments: Elderly female in bed, awake   HENT:      Head: Normocephalic and atraumatic  Right Ear: External ear normal       Left Ear: External ear normal       Nose: Nose normal  No congestion  Mouth/Throat:      Mouth: Mucous membranes are moist       Pharynx: Oropharynx is clear  No oropharyngeal exudate or posterior oropharyngeal erythema  Eyes:      General: No scleral icterus  Right eye: No discharge  Left eye: No discharge  Extraocular Movements: Extraocular movements intact  Conjunctiva/sclera: Conjunctivae normal       Pupils: Pupils are equal, round, and reactive to light  Neck:      Musculoskeletal: Normal range of motion and neck supple  No neck rigidity or muscular tenderness  Cardiovascular:      Rate and Rhythm: Normal rate and regular rhythm  Pulses: Normal pulses  Heart sounds: Normal heart sounds  No murmur  No friction rub  No gallop  Pulmonary:      Effort: Pulmonary effort is normal  No respiratory distress  Breath sounds: Normal breath sounds  No stridor  No wheezing, rhonchi or rales  Chest:      Chest wall: No tenderness  Abdominal:      General: Abdomen is flat  Bowel sounds are normal  There is no distension  Palpations: Abdomen is soft  There is no mass  Tenderness: There is no abdominal tenderness  There is no guarding or rebound  Musculoskeletal: Normal range of motion  General: No swelling, tenderness, deformity or signs of injury  Skin:     General: Skin is warm and dry  Capillary Refill: Capillary refill takes less than 2 seconds  Coloration: Skin is not jaundiced or pale  Findings: No bruising, erythema, lesion or rash        Comments: Wounds in bilateral lower extremities currently dressed, area of redness particularly in the left lower extremity continues to improve   Neurological:      General: No focal deficit present  Mental Status: She is alert and oriented to person, place, and time  Mental status is at baseline  Cranial Nerves: No cranial nerve deficit  Sensory: No sensory deficit  Motor: No weakness  Coordination: Coordination normal    Psychiatric:         Mood and Affect: Mood normal          Behavior: Behavior normal          Thought Content: Thought content normal          Judgment: Judgment normal          Additional Data:     Labs:    Results from last 7 days   Lab Units 06/12/21  0515  06/10/21  0532 06/09/21  0439   WBC Thousand/uL 9 05   < > 6 32 8 07   HEMOGLOBIN g/dL 8 0*   < > 7 8* 8 1*   HEMATOCRIT % 29 5*   < > 29 0* 29 7*   PLATELETS Thousands/uL 638*   < > 550* 586*   BANDS PCT %  --   --  1  --    NEUTROS PCT %  --   --   --  55   LYMPHS PCT %  --   --   --  14   LYMPHO PCT %  --   --  18  --    MONOS PCT %  --   --   --  26*   MONO PCT %  --   --  21*  --    EOS PCT %  --   --  3 3    < > = values in this interval not displayed  Results from last 7 days   Lab Units 06/12/21  0515 06/11/21  0614   SODIUM mmol/L 142 142   POTASSIUM mmol/L 4 2 4 1   CHLORIDE mmol/L 106 105   CO2 mmol/L 28 28   BUN mg/dL 11 11   CREATININE mg/dL 0 71 0 65   ANION GAP mmol/L 8 9   CALCIUM mg/dL 8 5 8 3   ALBUMIN g/dL  --  2 2*   TOTAL BILIRUBIN mg/dL  --  0 27   ALK PHOS U/L  --  73   ALT U/L  --  7*   AST U/L  --  14   GLUCOSE RANDOM mg/dL 95 88                 Results from last 7 days   Lab Units 06/07/21 2051   LACTIC ACID mmol/L 1 3           * I Have Reviewed All Lab Data Listed Above  * Additional Pertinent Lab Tests Reviewed: Kandi 66 Admission Reviewed      Recent Cultures (last 7 days):     Results from last 7 days   Lab Units 06/09/21 0822 06/07/21 2051   BLOOD CULTURE   --  No Growth After 4 Days  No Growth After 4 Days     GRAM STAIN RESULT  Rare Polys*  Rare Gram positive cocci in pairs*  --    WOUND CULTURE  2+ Growth of Methicillin Resistant Staphylococcus aureus*  --        Last 24 Hours Medication List:   Current Facility-Administered Medications   Medication Dose Route Frequency Provider Last Rate    acetaminophen  650 mg Oral Q4H PRN Isidro Osorio PA-C      aluminum-magnesium hydroxide-simethicone  30 mL Oral Q6H PRN Isidro Osorio PA-C      atorvastatin  20 mg Oral Daily With Sasha Grant PA-C      calcium carbonate-vitamin D  1 tablet Oral Daily With Breakfast Isidro Osorio PA-C      cyanocobalamin  1,000 mcg Oral Daily Tor Brisker, [place]      doxycycline hyclate  100 mg Oral Q12H 5001 EMI Coon MD      enoxaparin  40 mg Subcutaneous Daily [place], [place]      ferrous gluconate  324 mg Oral BID AC Marina Ratliff MD      gabapentin  600 mg Oral HS [place], [place]      HYDROmorphone  0 2 mg Intravenous Q3H PRN Isidro Osorio PA-C      melatonin  3 mg Oral HS Tor [place], [place]      multi-electrolyte  50 mL/hr Intravenous Continuous Marina Ratliff MD 50 mL/hr (06/12/21 9871)    mupirocin  1 application Topical BID Marina Ratliff MD      ondansetron  4 mg Intravenous Q6H PRN Isidro Osorio PA-C      oxyCODONE  2 5 mg Oral Q4H PRN Isidro Osorio PA-C      oxyCODONE  5 mg Oral Q4H PRN Isidro Osorio PA-C          Today, Patient Was Seen By: Marina Ratliff MD    ** Please Note: Dictation voice to text software may have been used in the creation of this document   **

## 2021-06-12 NOTE — ASSESSMENT & PLAN NOTE
· 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 1-1 5 years  States has been following with wound care clinic as outpt, but has been unable to go x 1 month 2/2 lack of transportation  (-) hx of DM  · Wound care consult  · CM consult to assist with wound care resources available as outpt/HH  · Wound care on board

## 2021-06-12 NOTE — UTILIZATION REVIEW
Continued Stay Review    Date:    6/12/21                          Current Patient Class:    Inpatient  Current Level of Care:   Med surg    HPI:76 y o  female initially admitted on    6/7   with   Cellulitis  Left foot    6/9  Bedside  I & D L foot  By podiatry    Assessment/Plan:   6/12      Left foot slowly improving  IV  Ancef d/c  6/11  Continue po doxy  Continue  PT/OT, poor mobility, refusing rehab  Continue to monitor temps and labs  Wound care  On case     Wounds in bilateral lower extremities currently dressed, area of redness particularly in the left lower extremity continues to improve     Vital Signs:   98 5 °F (36 9 °C)  78  --  91/65  74  96 %  --     06/12/21 07:30:14  97 7 °F (36 5 °C)  74  --  132/71  91  95 %  --   06/12/21 0722  --  --  --  --  --  --  None (Room air)         Pertinent Labs/Diagnostic Results:       Results from last 7 days   Lab Units 06/12/21 0515 06/11/21 0614 06/10/21  0532 06/09/21  0439 06/07/21  2051   WBC Thousand/uL 9 05 7 56 6 32 8 07 12 00*   HEMOGLOBIN g/dL 8 0* 8 0* 7 8* 8 1* 9 0*   HEMATOCRIT % 29 5* 29 7* 29 0* 29 7* 33 4*   PLATELETS Thousands/uL 638* 600* 550* 586* 654*   NEUTROS ABS Thousands/µL  --   --   --  4 46 8 35*   BANDS PCT %  --   --  1  --   --          Results from last 7 days   Lab Units 06/12/21  0515 06/11/21  0614 06/10/21  0532 06/09/21 0439 06/07/21 2051   SODIUM mmol/L 142 142 141 141 142   POTASSIUM mmol/L 4 2 4 1 3 9 4 1 4 9   CHLORIDE mmol/L 106 105 105 105 106   CO2 mmol/L 28 28 26 27 24   ANION GAP mmol/L 8 9 10 9 12   BUN mg/dL 11 11 11 13 13   CREATININE mg/dL 0 71 0 65 0 65 0 71 0 74   EGFR ml/min/1 73sq m 82 86 86 82 78   CALCIUM mg/dL 8 5 8 3 8 2* 8 6 8 9     Results from last 7 days   Lab Units 06/11/21 0614 06/09/21 0439   AST U/L 14 16   ALT U/L 7* 12   ALK PHOS U/L 73 90   TOTAL PROTEIN g/dL 6 6 6 5   ALBUMIN g/dL 2 2* 2 2*   TOTAL BILIRUBIN mg/dL 0 27 0 37         Results from last 7 days   Lab Units 06/12/21  0515 06/11/21  8875 06/10/21  0532 06/09/21  0439 06/07/21 2051   GLUCOSE RANDOM mg/dL 95 88 87 99 90           Results from last 7 days   Lab Units 06/07/21 2051   TROPONIN I ng/mL <0 02                     Results from last 7 days   Lab Units 06/07/21 2051   LACTIC ACID mmol/L 1 3             Results from last 7 days   Lab Units 06/07/21 2051   NT-PRO BNP pg/mL 1,433*     Results from last 7 days   Lab Units 06/08/21  0524   FERRITIN ng/mL 65               Results from last 7 days   Lab Units 06/09/21  0822 06/07/21 2051   BLOOD CULTURE   --  No Growth After 4 Days  No Growth After 4 Days  GRAM STAIN RESULT  Rare Polys*  Rare Gram positive cocci in pairs*  --    WOUND CULTURE  2+ Growth of Methicillin Resistant Staphylococcus aureus*  --      Results from last 7 days   Lab Units 06/10/21  0532   TOTAL COUNTED  100             Medications:   Scheduled Medications:  atorvastatin, 20 mg, Oral, Daily With Dinner  calcium carbonate-vitamin D, 1 tablet, Oral, Daily With Breakfast  cyanocobalamin, 1,000 mcg, Oral, Daily  doxycycline hyclate, 100 mg, Oral, Q12H [place]  enoxaparin, 40 mg, Subcutaneous, Daily  ferrous gluconate, 324 mg, Oral, BID AC  gabapentin, 600 mg, Oral, HS  melatonin, 3 mg, Oral, HS  mupirocin, 1 application, Topical, BID  IV  Ancef  D/c   6/11      Continuous IV Infusions:  multi-electrolyte, 50 mL/hr, Intravenous, Continuous      PRN Meds:  acetaminophen, 650 mg, Oral, Q4H PRN  aluminum-magnesium hydroxide-simethicone, 30 mL, Oral, Q6H PRN  HYDROmorphone, 0 2 mg, Intravenous, Q3H PRN  ondansetron, 4 mg, Intravenous, Q6H PRN  oxyCODONE, 2 5 mg, Oral, Q4H PRN  oxyCODONE, 5 mg, Oral, Q4H PRN        Discharge Plan:    D    Network Utilization Review Department  ATTENTION: Please call with any questions or concerns to [phone] and carefully listen to the prompts so that you are directed to the right person   All voicemails are confidential   James Oconnor all requests for admission clinical reviews, approved or denied determinations and any other requests to dedicated fax number below belonging to the campus where the patient is receiving treatment   List of dedicated fax numbers for the Facilities:  [address] [place] [address] DENIALS (Administrative/Medical Necessity) [phone]   [address] (Maternity/NICU/Pediatrics) [phone]   [address] [place] [address] [id number] [place] [address] [phone] [place] [address] [phone]

## 2021-06-12 NOTE — PLAN OF CARE
Problem: Potential for Falls  Goal: Patient will remain free of falls  Description: INTERVENTIONS:  - Assess patient frequently for physical needs  -  Identify cognitive and physical deficits and behaviors that affect risk of falls    -  [place] fall precautions as indicated by assessment   - Educate patient/family on patient safety including physical limitations  - Instruct patient to call for assistance with activity based on assessment  - Modify environment to reduce risk of injury  - Consider OT/PT consult to assist with strengthening/mobility  Outcome: Progressing     Problem: Prexisting or High Potential for Compromised Skin Integrity  Goal: Skin integrity is maintained or improved  Description: INTERVENTIONS:  - Identify patients at risk for skin breakdown  - Assess and monitor skin integrity  - Assess and monitor nutrition and hydration status  - Monitor labs   - Assess for incontinence   - Turn and reposition patient  - Assist with mobility/ambulation  - Relieve pressure over bony prominences  - Avoid friction and shearing  - Provide appropriate hygiene as needed including keeping skin clean and dry  - Evaluate need for skin moisturizer/barrier cream  - Collaborate with interdisciplinary team   - Patient/family teaching  - Consider wound care consult   Outcome: Progressing     Problem: PAIN - ADULT  Goal: Verbalizes/displays adequate comfort level or baseline comfort level  Description: Interventions:  - Encourage patient to monitor pain and request assistance  - Assess pain using appropriate pain scale  - Administer analgesics based on type and severity of pain and evaluate response  - Implement non-pharmacological measures as appropriate and evaluate response  - Consider cultural and social influences on pain and pain management  - Notify physician/advanced practitioner if interventions unsuccessful or patient reports new pain  Outcome: Progressing     Problem: INFECTION - ADULT  Goal: Absence or prevention of progression during hospitalization  Description: INTERVENTIONS:  - Assess and monitor for signs and symptoms of infection  - Monitor lab/diagnostic results  - Monitor all insertion sites, i e  indwelling lines, tubes, and drains  - Monitor endotracheal if appropriate and nasal secretions for changes in amount and color  - [place] appropriate cooling/warming therapies per order  - Administer medications as ordered  - Instruct and encourage patient and family to use good hand hygiene technique  - Identify and instruct in appropriate isolation precautions for identified infection/condition  Outcome: Progressing  Goal: Absence of fever/infection during neutropenic period  Description: INTERVENTIONS:  - Monitor WBC    Outcome: Progressing     Problem: SAFETY ADULT  Goal: Patient will remain free of falls  Description: INTERVENTIONS:  - Assess patient frequently for physical needs  -  Identify cognitive and physical deficits and behaviors that affect risk of falls    -  [place] fall precautions as indicated by assessment   - Educate patient/family on patient safety including physical limitations  - Instruct patient to call for assistance with activity based on assessment  - Modify environment to reduce risk of injury  - Consider OT/PT consult to assist with strengthening/mobility  Outcome: Progressing  Goal: Maintain or return to baseline ADL function  Description: INTERVENTIONS:  -  Assess patient's ability to carry out ADLs; assess patient's baseline for ADL function and identify physical deficits which impact ability to perform ADLs (bathing, care of mouth/teeth, toileting, grooming, dressing, etc )  - Assess/evaluate cause of self-care deficits   - Assess range of motion  - Assess patient's mobility; develop plan if impaired  - Assess patient's need for assistive devices and provide as appropriate  - Encourage maximum independence but intervene and supervise when necessary  - Involve family in performance of ADLs  - Assess for home care needs following discharge   - Consider OT consult to assist with ADL evaluation and planning for discharge  - Provide patient education as appropriate  Outcome: Progressing  Goal: Maintain or return mobility status to optimal level  Description: INTERVENTIONS:  - Assess patient's baseline mobility status (ambulation, transfers, stairs, etc )    - Identify cognitive and physical deficits and behaviors that affect mobility  - Identify mobility aids required to assist with transfers and/or ambulation (gait belt, sit-to-stand, lift, walker, cane, etc )  - [place] fall precautions as indicated by assessment  - Record patient progress and toleration of activity level on Mobility SBAR; progress patient to next Phase/Stage  - Instruct patient to call for assistance with activity based on assessment  - Consider rehabilitation consult to assist with strengthening/weightbearing, etc   Outcome: Progressing     Problem: DISCHARGE PLANNING  Goal: Discharge to home or other facility with appropriate resources  Description: INTERVENTIONS:  - Identify barriers to discharge w/patient and caregiver  - Arrange for needed discharge resources and transportation as appropriate  - Identify discharge learning needs (meds, wound care, etc )  - Arrange for interpretive services to assist at discharge as needed  - Refer to Case Management Department for coordinating discharge planning if the patient needs post-hospital services based on physician/advanced practitioner order or complex needs related to functional status, cognitive ability, or social support system  Outcome: Progressing     Problem: Knowledge Deficit  Goal: Patient/family/caregiver demonstrates understanding of disease process, treatment plan, medications, and discharge instructions  Description: Complete learning assessment and assess knowledge base    Interventions:  - Provide teaching at level of understanding  - Provide teaching via preferred learning methods  Outcome: Progressing     Problem: Nutrition/Hydration-ADULT  Goal: Nutrient/Hydration intake appropriate for improving, restoring or maintaining nutritional needs  Description: Monitor and assess patient's nutrition/hydration status for malnutrition  Collaborate with interdisciplinary team and initiate plan and interventions as ordered  Monitor patient's weight and dietary intake as ordered or per policy  Utilize nutrition screening tool and intervene as necessary  Determine patient's food preferences and provide high-protein, high-caloric foods as appropriate       INTERVENTIONS:  - Monitor oral intake, urinary output, labs, and treatment plans  - Assess nutrition and hydration status and recommend course of action  - Evaluate amount of meals eaten  - Assist patient with eating if necessary   - Allow adequate time for meals  - Recommend/ encourage appropriate diets, oral nutritional supplements, and vitamin/mineral supplements  - Order, calculate, and assess calorie counts as needed  - Recommend, monitor, and adjust tube feedings and TPN/PPN based on assessed needs  - Assess need for intravenous fluids  - Provide specific nutrition/hydration education as appropriate  - Include patient/family/caregiver in decisions related to nutrition  Outcome: Progressing

## 2021-06-13 PROBLEM — D62 ACUTE BLOOD LOSS ANEMIA: Status: ACTIVE | Noted: 2021-06-13

## 2021-06-13 LAB
BACTERIA BLD CULT: NORMAL
BACTERIA BLD CULT: NORMAL
BASOPHILS # BLD AUTO: 0.02 THOUSANDS/ΜL (ref 0–0.1)
BASOPHILS NFR BLD AUTO: 0 % (ref 0–1)
EOSINOPHIL # BLD AUTO: 0.31 THOUSAND/ΜL (ref 0–0.61)
EOSINOPHIL NFR BLD AUTO: 4 % (ref 0–6)
ERYTHROCYTE [DISTWIDTH] IN BLOOD BY AUTOMATED COUNT: 17.6 % (ref 11.6–15.1)
HCT VFR BLD AUTO: 26.6 % (ref 34.8–46.1)
HGB BLD-MCNC: 7 G/DL (ref 11.5–15.4)
IMM GRANULOCYTES # BLD AUTO: 0.46 THOUSAND/UL (ref 0–0.2)
IMM GRANULOCYTES NFR BLD AUTO: 6 % (ref 0–2)
LYMPHOCYTES # BLD AUTO: 1.05 THOUSANDS/ΜL (ref 0.6–4.47)
LYMPHOCYTES NFR BLD AUTO: 13 % (ref 14–44)
MCH RBC QN AUTO: 21.6 PG (ref 26.8–34.3)
MCHC RBC AUTO-ENTMCNC: 25.9 G/DL (ref 31.4–37.4)
MCV RBC AUTO: 83 FL (ref 82–98)
MONOCYTES # BLD AUTO: 1.85 THOUSAND/ΜL (ref 0.17–1.22)
MONOCYTES NFR BLD AUTO: 24 % (ref 4–12)
NEUTROPHILS # BLD AUTO: 4.18 THOUSANDS/ΜL (ref 1.85–7.62)
NEUTS SEG NFR BLD AUTO: 53 % (ref 43–75)
NRBC BLD AUTO-RTO: 1 /100 WBCS
PLATELET # BLD AUTO: 538 THOUSANDS/UL (ref 149–390)
PMV BLD AUTO: 11.8 FL (ref 8.9–12.7)
RBC # BLD AUTO: 3.19 MILLION/UL (ref 3.81–5.12)
WBC # BLD AUTO: 7.87 THOUSAND/UL (ref 4.31–10.16)

## 2021-06-13 PROCEDURE — 85025 COMPLETE CBC W/AUTO DIFF WBC: CPT | Performed by: INTERNAL MEDICINE

## 2021-06-13 PROCEDURE — 99232 SBSQ HOSP IP/OBS MODERATE 35: CPT | Performed by: INTERNAL MEDICINE

## 2021-06-13 RX ADMIN — Medication 3 MG: at 21:23

## 2021-06-13 RX ADMIN — IRON SUCROSE 300 MG: 20 INJECTION, SOLUTION INTRAVENOUS at 09:35

## 2021-06-13 RX ADMIN — ENOXAPARIN SODIUM 40 MG: 40 INJECTION SUBCUTANEOUS at 08:24

## 2021-06-13 RX ADMIN — FERROUS GLUCONATE 324 MG: 324 TABLET ORAL at 08:25

## 2021-06-13 RX ADMIN — CYANOCOBALAMIN TAB 500 MCG 1000 MCG: 500 TAB at 08:24

## 2021-06-13 RX ADMIN — DOXYCYCLINE 100 MG: 100 CAPSULE ORAL at 08:24

## 2021-06-13 RX ADMIN — DOXYCYCLINE 100 MG: 100 CAPSULE ORAL at 21:23

## 2021-06-13 RX ADMIN — MUPIROCIN 1 APPLICATION: 20 OINTMENT TOPICAL at 17:21

## 2021-06-13 RX ADMIN — MUPIROCIN 1 APPLICATION: 20 OINTMENT TOPICAL at 08:25

## 2021-06-13 RX ADMIN — GABAPENTIN 600 MG: 300 CAPSULE ORAL at 21:23

## 2021-06-13 RX ADMIN — Medication 1 TABLET: at 08:24

## 2021-06-13 RX ADMIN — ACETAMINOPHEN 650 MG: 325 TABLET, FILM COATED ORAL at 21:23

## 2021-06-13 RX ADMIN — ATORVASTATIN CALCIUM 20 MG: 20 TABLET, FILM COATED ORAL at 17:20

## 2021-06-13 RX ADMIN — FERROUS GLUCONATE 324 MG: 324 TABLET ORAL at 17:20

## 2021-06-13 NOTE — ASSESSMENT & PLAN NOTE
· Hb decreased at 9 0 on admission  MCV 81  · This was chronic however  recently has been decreasing during hospitalization  It can possibly be related to bleeding from wounds periprocedure  See plan under acute blood loss anemia

## 2021-06-13 NOTE — ASSESSMENT & PLAN NOTE
· Patient presented with Fluid-filled blister that appears cloudy to left lateral ankle  States first noticed on day prior to admission  Erythema and warmth surrounding to mid foot and mid-thigh  Also has chronic ulceration to left medial ankle and right medial ankle/calf with surrounding erythema  · WBC 12 00  LA 1 3  Afebrile    Plan:  Continues to improve day by day  Podiatry input appreciated - patient underwent incision and drainage of left small abscess of the heel  On cephazolin IV initially, culture growing MRSA, switched to doxycycline  Monitor clinically, temperature curve, white count    Patients mobility is pretty poor, she has been recommended rehab  However she then told me she wants to go home with home VNA  I told her that she needs to do better if she wants to go home since she has not moved from the bed recently I am not sure if she is steady enough to go home at all  PT/OT reconsulted to reassess her status as she has not moved out of bed  Continue to monitor Hb which is dropping, physical status

## 2021-06-13 NOTE — ASSESSMENT & PLAN NOTE
Hb noted to be dropping, currently 7  In the setting of chronic iron deficiency but now aggravated by bleeding from wounds  Patient does mentions some preprocedural bleeding from wounds  Will monitor Hb closely, may need transfusion if <7  Has received IV iron  Continue PO iron

## 2021-06-13 NOTE — ASSESSMENT & PLAN NOTE
· 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 1-1 5 years  States has been following with wound care clinic as outpt, but has been unable to go x 1 month 2/2 lack of transportation  (-) hx of DM     · Wound care has been consulted, input appreciated

## 2021-06-13 NOTE — PROGRESS NOTES
[address] [place] [address]  Progress Note - Robbi Hui 1945, 67 y o  female MRN: [id number]  Unit/Bed#: [facility] Amanda Encounter: [id number]  Primary Care Provider: TELLY Ruiz   Date and time admitted to hospital: 6/7/2021  7:43 PM    * Cellulitis of left foot  Assessment & Plan  · Patient presented with Fluid-filled blister that appears cloudy to left lateral ankle  States first noticed on day prior to admission  Erythema and warmth surrounding to mid foot and mid-thigh  Also has chronic ulceration to left medial ankle and right medial ankle/calf with surrounding erythema  · WBC 12 00  LA 1 3  Afebrile    Plan:  Continues to improve day by day  Podiatry input appreciated - patient underwent incision and drainage of left small abscess of the heel  On cephazolin IV initially, culture growing MRSA, switched to doxycycline  Monitor clinically, temperature curve, white count    Patients mobility is pretty poor, she has been recommended rehab  However she then told me she wants to go home with home VNA  I told her that she needs to do better if she wants to go home since she has not moved from the bed recently I am not sure if she is steady enough to go home at all  PT/OT reconsulted to reassess her status as she has not moved out of bed  Continue to monitor Hb which is dropping, physical status  Acute blood loss anemia  Assessment & Plan  Hb noted to be dropping, currently 7  In the setting of chronic iron deficiency but now aggravated by bleeding from wounds  Patient does mentions some preprocedural bleeding from wounds  Will monitor Hb closely, may need transfusion if <7  Has received IV iron  Continue PO iron  Open wound of right lower leg  Assessment & Plan  · Chronic ulceration to right medial ankle/calf x 1-1 5 years  · See AP above for LLE wound    Open wound of left lower leg  Assessment & Plan  · 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 1-1 5 years   States has been following with wound care clinic as outpt, but has been unable to go x 1 month 2/2 lack of transportation  (-) hx of DM  · Wound care has been consulted, input appreciated    Microcytic anemia  Assessment & Plan  · Hb decreased at 9 0 on admission  MCV 81  · This was chronic however  recently has been decreasing during hospitalization  It can possibly be related to bleeding from wounds periprocedure  See plan under acute blood loss anemia  Essential (primary) hypertension  Assessment & Plan  · BP adequately controlled currently  · States is no longer taking home dose Atenolol, HCTZ or Vastec because her BP was running too low  States sis not address this with PCP prior to discontinuing her meds  · Hydrochlorothiazide has been discontinued due to blood pressure on the lower side  · Monitor BP per unit protocol      VTE Pharmacologic Prophylaxis:   Pharmacologic: Enoxaparin (Lovenox)  Mechanical VTE Prophylaxis in Place: Yes    Patient Centered Rounds: I have performed bedside rounds with nursing staff today  Discussions with Specialists or Other Care Team Provider:  Discussed with care team      Time Spent for Care: 30 minutes  More than 50% of total time spent on counseling and coordination of care as described above  Current Length of Stay: 6 day(s)    Current Patient Status: Inpatient   Certification Statement: The patient will continue to require additional inpatient hospital stay due to Need for close hemoglobin monitoring, PT OT evaluation    Discharge Plan:  Once stable    Code Status: Level 2 - DNAR: but accepts endotracheal intubation      Subjective:     Patient evaluated this morning  She feels otherwise okay however he does mention that the day of the procedure she did have some bleeding from wounds  Currently dressing is dry  However hemoglobin has been dropping, currently 7  No GI bleeding otherwise  She denies any nausea vomiting  Tolerated doxycycline well  Has not worked with PT/OT yet    No other events reported  Objective:     Vitals:   Temp (24hrs), Av 4 °F (36 9 °C), Min:98 1 °F (36 7 °C), Max:98 5 °F (36 9 °C)    Temp:  [98 1 °F (36 7 °C)-98 5 °F (36 9 °C)] 98 5 °F (36 9 °C)  HR:  [64-74] 64  Resp:  [19] 19  BP: ()/(58-63) 119/58  SpO2:  [95 %-96 %] 96 %  Body mass index is 24 21 kg/m²  Input and Output Summary (last 24 hours): Intake/Output Summary (Last 24 hours) at 2021 1439  Last data filed at 2021 1310  Gross per 24 hour   Intake 600 ml   Output 1200 ml   Net -600 ml       Physical Exam:     Physical Exam  Vitals and nursing note reviewed  Constitutional:       Appearance: Normal appearance  She is normal weight  Comments: Elderly female sitting in bed, awake   HENT:      Head: Normocephalic and atraumatic  Right Ear: External ear normal       Left Ear: External ear normal       Nose: Nose normal  No congestion  Mouth/Throat:      Mouth: Mucous membranes are moist       Pharynx: Oropharynx is clear  No oropharyngeal exudate or posterior oropharyngeal erythema  Eyes:      General: No scleral icterus  Right eye: No discharge  Left eye: No discharge  Extraocular Movements: Extraocular movements intact  Conjunctiva/sclera: Conjunctivae normal       Pupils: Pupils are equal, round, and reactive to light  Cardiovascular:      Rate and Rhythm: Normal rate and regular rhythm  Pulses: Normal pulses  Heart sounds: Normal heart sounds  No murmur heard  No friction rub  No gallop  Pulmonary:      Effort: Pulmonary effort is normal  No respiratory distress  Breath sounds: Normal breath sounds  No stridor  No wheezing, rhonchi or rales  Chest:      Chest wall: No tenderness  Abdominal:      General: Abdomen is flat  Bowel sounds are normal  There is no distension  Palpations: Abdomen is soft  There is no mass  Tenderness: There is no abdominal tenderness  There is no guarding or rebound  Musculoskeletal:         General: No swelling, tenderness, deformity or signs of injury  Normal range of motion  Cervical back: Normal range of motion and neck supple  No rigidity  No muscular tenderness  Skin:     General: Skin is warm and dry  Capillary Refill: Capillary refill takes less than 2 seconds  Coloration: Skin is pale  Skin is not jaundiced  Findings: No bruising, erythema, lesion or rash  Comments: Noted wounds in bilateral lower extremities   Redness significantly improved compared to days before  Distal pulses present   Neurological:      General: No focal deficit present  Mental Status: She is alert and oriented to person, place, and time  Mental status is at baseline  Cranial Nerves: No cranial nerve deficit  Sensory: No sensory deficit  Motor: No weakness  Coordination: Coordination normal    Psychiatric:         Mood and Affect: Mood normal          Behavior: Behavior normal          Thought Content:  Thought content normal          Judgment: Judgment normal            Additional Data:     Labs:    Results from last 7 days   Lab Units 06/13/21  0628 06/10/21  0532   WBC Thousand/uL 7 87 6 32   HEMOGLOBIN g/dL 7 0* 7 8*   HEMATOCRIT % 26 6* 29 0*   PLATELETS Thousands/uL 538* 550*   BANDS PCT %  --  1   NEUTROS PCT % 53  --    LYMPHS PCT % 13*  --    LYMPHO PCT %  --  18   MONOS PCT % 24*  --    MONO PCT %  --  21*   EOS PCT % 4 3     Results from last 7 days   Lab Units 06/12/21  0515 06/11/21  0614   SODIUM mmol/L 142 142   POTASSIUM mmol/L 4 2 4 1   CHLORIDE mmol/L 106 105   CO2 mmol/L 28 28   BUN mg/dL 11 11   CREATININE mg/dL 0 71 0 65   ANION GAP mmol/L 8 9   CALCIUM mg/dL 8 5 8 3   ALBUMIN g/dL  --  2 2*   TOTAL BILIRUBIN mg/dL  --  0 27   ALK PHOS U/L  --  73   ALT U/L  --  7*   AST U/L  --  14   GLUCOSE RANDOM mg/dL 95 88                 Results from last 7 days   Lab Units 06/07/21  2051   LACTIC ACID mmol/L 1 3           * I Have Reviewed All Lab Data Listed Above  * Additional Pertinent Lab Tests Reviewed: Kandi 66 Admission Reviewed      Recent Cultures (last 7 days):     Results from last 7 days   Lab Units 06/09/21  0822 06/07/21 2051   BLOOD CULTURE   --  No Growth After 5 Days  No Growth After 5 Days  GRAM STAIN RESULT  Rare Polys*  Rare Gram positive cocci in pairs*  --    WOUND CULTURE  2+ Growth of Methicillin Resistant Staphylococcus aureus*  --        Last 24 Hours Medication List:   Current Facility-Administered Medications   Medication Dose Route Frequency Provider Last Rate    acetaminophen  650 mg Oral Q4H PRN Svetlana Whaley PA-C      aluminum-magnesium hydroxide-simethicone  30 mL Oral Q6H PRN Svetlana Whaley PA-C      atorvastatin  20 mg Oral Daily With [place] LISA Barth      calcium carbonate-vitamin D  1 tablet Oral Daily With Breakfast Svetlana Whaley PA-C      cyanocobalamin  1,000 mcg Oral Daily [place], [place]      doxycycline hyclate  100 mg Oral Q12H 5001 EMI Coon MD      enoxaparin  40 mg Subcutaneous Daily [place], [place]      ferrous gluconate  324 mg Oral BID AC Michaela Cullen MD      gabapentin  600 mg Oral HS [place], [place]      HYDROmorphone  0 2 mg Intravenous Q3H PRN Svetlana Whaley PA-C      melatonin  3 mg Oral HS [place], [place]      mupirocin  1 application Topical BID Michaela Cullen MD      ondansetron  4 mg Intravenous Q6H PRN Svetlana Whaley PA-C      oxyCODONE  2 5 mg Oral Q4H PRN Svetlana Whaley PA-C      oxyCODONE  5 mg Oral Q4H PRN Svetlana Whaley PA-C          Today, Patient Was Seen By: Michaela Cullen MD    ** Please Note: Dictation voice to text software may have been used in the creation of this document   **

## 2021-06-14 VITALS
OXYGEN SATURATION: 99 % | HEIGHT: 63 IN | RESPIRATION RATE: 16 BRPM | WEIGHT: 136.69 LBS | TEMPERATURE: 97.3 F | DIASTOLIC BLOOD PRESSURE: 55 MMHG | SYSTOLIC BLOOD PRESSURE: 111 MMHG | BODY MASS INDEX: 24.22 KG/M2 | HEART RATE: 74 BPM

## 2021-06-14 LAB
ANION GAP SERPL CALCULATED.3IONS-SCNC: 9 MMOL/L (ref 4–13)
BASOPHILS # BLD MANUAL: 0 THOUSAND/UL (ref 0–0.1)
BASOPHILS NFR MAR MANUAL: 0 % (ref 0–1)
BUN SERPL-MCNC: 10 MG/DL (ref 5–25)
CALCIUM SERPL-MCNC: 8.9 MG/DL (ref 8.3–10.1)
CHLORIDE SERPL-SCNC: 105 MMOL/L (ref 100–108)
CO2 SERPL-SCNC: 26 MMOL/L (ref 21–32)
CREAT SERPL-MCNC: 0.63 MG/DL (ref 0.6–1.3)
EOSINOPHIL # BLD MANUAL: 0.51 THOUSAND/UL (ref 0–0.4)
EOSINOPHIL NFR BLD MANUAL: 4 % (ref 0–6)
ERYTHROCYTE [DISTWIDTH] IN BLOOD BY AUTOMATED COUNT: 17.8 % (ref 11.6–15.1)
GFR SERPL CREATININE-BSD FRML MDRD: 87 ML/MIN/1.73SQ M
GIANT PLATELETS BLD QL SMEAR: PRESENT
GLUCOSE SERPL-MCNC: 85 MG/DL (ref 65–140)
HCT VFR BLD AUTO: 31.8 % (ref 34.8–46.1)
HGB BLD-MCNC: 8.5 G/DL (ref 11.5–15.4)
LG PLATELETS BLD QL SMEAR: PRESENT
LYMPHOCYTES # BLD AUTO: 1.02 THOUSAND/UL (ref 0.6–4.47)
LYMPHOCYTES # BLD AUTO: 8 % (ref 14–44)
MCH RBC QN AUTO: 21.6 PG (ref 26.8–34.3)
MCHC RBC AUTO-ENTMCNC: 26.7 G/DL (ref 31.4–37.4)
MCV RBC AUTO: 81 FL (ref 82–98)
MONOCYTES # BLD AUTO: 3.45 THOUSAND/UL (ref 0–1.22)
MONOCYTES NFR BLD: 27 % (ref 4–12)
MYELOCYTES NFR BLD MANUAL: 1 % (ref 0–1)
NEUTROPHILS # BLD MANUAL: 7.67 THOUSAND/UL (ref 1.85–7.62)
NEUTS SEG NFR BLD AUTO: 60 % (ref 43–75)
NRBC BLD AUTO-RTO: 0 /100 WBCS
PLATELET # BLD AUTO: 702 THOUSANDS/UL (ref 149–390)
PLATELET BLD QL SMEAR: ABNORMAL
PMV BLD AUTO: 10.8 FL (ref 8.9–12.7)
POTASSIUM SERPL-SCNC: 4.5 MMOL/L (ref 3.5–5.3)
RBC # BLD AUTO: 3.93 MILLION/UL (ref 3.81–5.12)
SODIUM SERPL-SCNC: 140 MMOL/L (ref 136–145)
TOTAL CELLS COUNTED SPEC: 100
WBC # BLD AUTO: 12.79 THOUSAND/UL (ref 4.31–10.16)

## 2021-06-14 PROCEDURE — 85027 COMPLETE CBC AUTOMATED: CPT | Performed by: INTERNAL MEDICINE

## 2021-06-14 PROCEDURE — 80048 BASIC METABOLIC PNL TOTAL CA: CPT | Performed by: INTERNAL MEDICINE

## 2021-06-14 PROCEDURE — 99239 HOSP IP/OBS DSCHRG MGMT >30: CPT | Performed by: INTERNAL MEDICINE

## 2021-06-14 PROCEDURE — 85007 BL SMEAR W/DIFF WBC COUNT: CPT | Performed by: INTERNAL MEDICINE

## 2021-06-14 PROCEDURE — 97530 THERAPEUTIC ACTIVITIES: CPT

## 2021-06-14 RX ORDER — DOXYCYCLINE HYCLATE 100 MG/1
100 CAPSULE ORAL EVERY 12 HOURS SCHEDULED
Qty: 14 CAPSULE | Refills: 0 | Status: SHIPPED | OUTPATIENT
Start: 2021-06-14 | End: 2021-06-21

## 2021-06-14 RX ADMIN — FERROUS GLUCONATE 324 MG: 324 TABLET ORAL at 11:24

## 2021-06-14 RX ADMIN — CYANOCOBALAMIN TAB 500 MCG 1000 MCG: 500 TAB at 11:23

## 2021-06-14 RX ADMIN — MUPIROCIN 1 APPLICATION: 20 OINTMENT TOPICAL at 17:46

## 2021-06-14 RX ADMIN — MUPIROCIN 1 APPLICATION: 20 OINTMENT TOPICAL at 11:24

## 2021-06-14 RX ADMIN — ATORVASTATIN CALCIUM 20 MG: 20 TABLET, FILM COATED ORAL at 17:46

## 2021-06-14 RX ADMIN — Medication 1 TABLET: at 11:23

## 2021-06-14 RX ADMIN — ENOXAPARIN SODIUM 40 MG: 40 INJECTION SUBCUTANEOUS at 11:23

## 2021-06-14 RX ADMIN — DOXYCYCLINE 100 MG: 100 CAPSULE ORAL at 11:23

## 2021-06-14 RX ADMIN — FERROUS GLUCONATE 324 MG: 324 TABLET ORAL at 17:46

## 2021-06-14 NOTE — PHYSICAL THERAPY NOTE
PT Progress Note (11min)  (12:55-13:06)       06/14/21 1306   PT Last Visit   PT Visit Date 06/14/21   Note Type   Note Type Treatment   Pain Assessment   Pain Assessment Tool Pain Assessment not indicated - pt denies pain   Restrictions/Precautions   Weight Bearing Precautions Per Order Yes   LLE Weight Bearing Per Order NWB  (assumed 2* multiple wounds)   Other Precautions Bed Alarm;Telemetry; Fall Risk   General   Chart Reviewed Yes   Response to Previous Treatment Patient with no complaints from previous session  Family/Caregiver Present No   Cognition   Orientation Level Oriented X4   Subjective   Subjective pt agreeable to PT session  "I get up c my roller at home"  Bed Mobility   Supine to Sit 5  Supervision   Additional items HOB elevated; Bedrails; Increased time required;Verbal cues   Sit to Supine 4  Minimal assistance   Additional items Assist x 1; Increased time required;Verbal cues;LE management   Transfers   Sit to Stand 3  Moderate assistance   Additional items Assist x 1;Verbal cues; Increased time required  (pt non-compliant c NWB L LE)   Stand to Sit 3  Moderate assistance   Additional items Assist x 1;Verbal cues; Increased time required   Additional Comments noted increased bloody drainage L heel wound while seated c LE in dependent position; nsg notified  Ambulation/Elevation   Gait pattern Not appropriate   Balance   Static Sitting Fair +   Dynamic Sitting Fair   Endurance Deficit   Endurance Deficit Yes   Activity Tolerance   Activity Tolerance Other (Comment)  (increased bloody drainage L heel wound)   Nurse Made Aware VINICIUS Salomon aware of increased bloody drainage L heel wound  Assessment   Prognosis Good   Problem List Decreased strength;Decreased endurance; Impaired balance;Decreased mobility; Decreased skin integrity   Assessment pt seen for PT treatment session  pt kept NWB L LE 2* heel wound   completed bed mobility tasks (S)/min (A)x1  attempted sit>stand transition mod (A)x1, however pt unable to maintain NWB L LE; also noted increased bloody drainage L heel wound; nsg notified  pt returned to supine c LEs elevated + nsg present  bed alarm activated  will cont skilled PT to further maximize functional mobility + improve quality of life  AM-PAC raw score 10 indicating pt would benefit from skilled PT, however please refer to PT d/c recommendation for safe d/c planning  Barriers to Discharge Inaccessible home environment   Goals   Patient Goals "To go home"  STG Expiration Date 06/19/21   PT Treatment Day 1   Plan   Treatment/Interventions LE strengthening/ROM; Therapeutic exercise; Endurance training;Patient/family training;Equipment eval/education; Bed mobility;Spoke to nursing;Spoke to case management   Progress Progressing toward goals   PT Frequency Other (Comment)  (3-5x/wk)   Recommendation   PT Discharge Recommendation Post acute rehabilitation services   PT - OK to Discharge Yes  (to STR when stable  )   AM-PAC Basic Mobility Inpatient   Turning in Bed Without Bedrails 3   Lying on Back to Sitting on Edge of Flat Bed 3   Moving Bed to Chair 1   Standing Up From Chair 1   Walk in Room 1   Climb 3-5 Stairs 1   Basic Mobility Inpatient Raw Score 10   Turning Head Towards Sound 4   Follow Simple Instructions 4   Low Function Basic Mobility Raw Score 18   Low Function Basic Mobility Standardized Score 29 25     Isiah Del Rio, PT, DPT

## 2021-06-14 NOTE — DISCHARGE INSTR - AVS FIRST PAGE
Start taking doxycycline 100 mg once a day for 7 days    Follow-up with podiatry    Follow-up with primary care physician      Skin care plans:   1-Hydraguard to bilateral sacrum, buttock and heels BID and PRN   2-Elevate heels to offload pressure  3-Ehob cushion in chair when out of bed  4-Moisturize skin daily with skin nourishing cream    5-Turn/reposition q2h or when medically stable for pressure re-distribution on skin  6-Wound care will sign off at this time  Follow orders per podiatry for lower extremity wound care

## 2021-06-14 NOTE — PLAN OF CARE
Problem: PHYSICAL THERAPY ADULT  Goal: Performs mobility at highest level of function for planned discharge setting  See evaluation for individualized goals  Description: Treatment/Interventions: Functional transfer training, LE strengthening/ROM, Therapeutic exercise, Endurance training, Patient/family training, Bed mobility, Continued evaluation, Spoke to nursing          See flowsheet documentation for full assessment, interventions and recommendations  Outcome: Progressing  Note: Prognosis: Good  Problem List: Decreased strength, Decreased endurance, Impaired balance, Decreased mobility, Decreased skin integrity  Assessment: pt seen for PT treatment session  pt kept NWB L LE 2* heel wound  completed bed mobility tasks (S)/min (A)x1  attempted sit>stand transition mod (A)x1, however pt unable to maintain NWB L LE; also noted increased bloody drainage L heel wound; nsg notified  pt returned to supine c LEs elevated + nsg present  bed alarm activated  will cont skilled PT to further maximize functional mobility + improve quality of life  AM-PAC raw score 10 indicating pt would benefit from skilled PT, however please refer to PT d/c recommendation for safe d/c planning  Barriers to Discharge: Inaccessible home environment        PT Discharge Recommendation: Post acute rehabilitation services     PT - OK to Discharge: Yes (to STR when stable )    See flowsheet documentation for full assessment

## 2021-06-14 NOTE — DISCHARGE SUMMARY
[facility]  Discharge- Monica Samson 1945, 67 y o  female MRN: [id number]  Unit/Bed#: -Alejandro Encounter: [id number]  Primary Care Provider: TELLY José   Date and time admitted to hospital: 6/7/2021  7:43 PM    Discharge diagnosis:    1  LLE cellulitis  2  Acute blood loss anemia  3  Open wounds b/l lower extremities  4  MRSA on wound cultures  5  HTN    Discharging Physician / Practitioner: Meghan Eubansk MD  PCP: Jazmyne José  Admission Date:   Admission Orders (From admission, onward)     Ordered        06/07/21 2202  Inpatient Admission  Once                   Discharge Date: 06/14/21      Consultations During Hospital Stay:  · Podiatry      Complications:  None    Reason for Admission: L foot cellulitis    HPI:  Monica Samson is a 67 y o  female who presents with BLE wounds with cellulitis  Reports chronic open wounds to BLE x 1-1 5 years  States she follows with wound care clinic as outpt but has been unable to go x 1 month 2/2 lack of transportation  Reports no longer taking prescription statin and BP meds 2/2 BP getting too low  States she did not discuss this with PCP        Hospital Course:     Patient was hospitalized, treated initially with cephazolin subsequently transition to doxycycline after culture came positive for MRSA  Otherwise nontoxic  She underwent I and D of small abscess on left heel was performed at the bedside by podiatry  She was evaluated by PT/OT and recommended rehab but she wanted to go home  She did have minor blood loss periprocedure but has received iron supplementation, Hb currently 8 4 patient is asymptomatic in that regard  On 06/14/2021 patient was to go home  She is in stable condition, nontoxic  Cellulitis is much better  Tolerating p o  Intake  I have sent her on a course of doxycycline    She should follow-up with podiatrist outpatient    Wound care instructions as per wound care:  Skin care plans:   1-Hydraguard to bilateral sacrum, buttock and heels BID and PRN   2-Elevate heels to offload pressure  3-Ehob cushion in chair when out of bed  4-Moisturize skin daily with skin nourishing cream    5-Turn/reposition q2h or when medically stable for pressure re-distribution on skin  6-Wound care will sign off at this time  Follow orders per podiatry for lower extremity wound care  Condition at Discharge: good     Discharge Day Visit / Exam:     Subjective:  Patient evaluated this morning  Vitals: Blood Pressure: 111/55 (06/14/21 0817)  Pulse: 74 (06/14/21 0817)  Temperature: (!) 97 3 °F (36 3 °C) (06/14/21 0817)  Temp Source: Oral (06/07/21 2008)  Respirations: 16 (06/14/21 0817)  Height: 5' 3" (160 cm) (06/09/21 1427)  Weight - Scale: 62 kg (136 lb 11 oz) (06/07/21 2008)  SpO2: 99 % (06/14/21 0817)  Exam:   Physical Exam  Vitals and nursing note reviewed  Constitutional:       General: She is not in acute distress  Appearance: Normal appearance  She is normal weight  She is not ill-appearing, toxic-appearing or diaphoretic  Comments: Elderly female in bed, awake   HENT:      Head: Normocephalic and atraumatic  Right Ear: External ear normal       Left Ear: External ear normal       Nose: Nose normal  No congestion  Mouth/Throat:      Mouth: Mucous membranes are moist       Pharynx: Oropharynx is clear  No oropharyngeal exudate or posterior oropharyngeal erythema  Eyes:      General: No scleral icterus  Right eye: No discharge  Left eye: No discharge  Extraocular Movements: Extraocular movements intact  Conjunctiva/sclera: Conjunctivae normal       Pupils: Pupils are equal, round, and reactive to light  Cardiovascular:      Rate and Rhythm: Normal rate and regular rhythm  Pulses: Normal pulses  Heart sounds: Normal heart sounds  No murmur heard  No friction rub  No gallop  Pulmonary:      Effort: Pulmonary effort is normal  No respiratory distress  Breath sounds: Normal breath sounds  No stridor  No wheezing, rhonchi or rales  Chest:      Chest wall: No tenderness  Abdominal:      General: Abdomen is flat  Bowel sounds are normal  There is no distension  Palpations: Abdomen is soft  There is no mass  Tenderness: There is no abdominal tenderness  There is no guarding or rebound  Musculoskeletal:         General: No swelling, tenderness, deformity or signs of injury  Normal range of motion  Cervical back: Normal range of motion and neck supple  No rigidity  No muscular tenderness  Skin:     General: Skin is warm and dry  Capillary Refill: Capillary refill takes less than 2 seconds  Coloration: Skin is not jaundiced or pale  Findings: No bruising, erythema, lesion or rash  Comments: Noted wounds in bilateral lower extremities   Erythema significantly improved compared to days before  Distal pulses present   Neurological:      General: No focal deficit present  Mental Status: She is alert and oriented to person, place, and time  Mental status is at baseline  Cranial Nerves: No cranial nerve deficit  Sensory: No sensory deficit  Motor: No weakness  Coordination: Coordination normal    Psychiatric:         Mood and Affect: Mood normal          Behavior: Behavior normal          Thought Content: Thought content normal          Judgment: Judgment normal          Discussion with Family:  Patient, attempted to call contact on file no one picked up    Discharge instructions/Information to patient and family:   See after visit summary for information provided to patient and family  Provisions for Follow-Up Care:  See after visit summary for information related to follow-up care and any pertinent home health orders        Disposition:     Home   * patient was offered rehab but refused    For Discharges to [facility] SNF:   · Not Applicable to this Patient - Not Applicable to this Patient    Planned Readmission: No     Discharge Statement:  I spent 35 minutes discharging the patient  This time was spent on the day of discharge  I had direct contact with the patient on the day of discharge  Greater than 50% of the total time was spent examining patient, answering all patient questions, arranging and discussing plan of care with patient as well as directly providing post-discharge instructions  Additional time then spent on discharge activities  Discharge Medications:  See after visit summary for reconciled discharge medications provided to patient and family        ** Please Note: This note has been constructed using a voice recognition system **

## 2021-06-14 NOTE — PLAN OF CARE
Problem: Potential for Falls  Goal: Patient will remain free of falls  Description: INTERVENTIONS:  - Assess patient frequently for physical needs  -  Identify cognitive and physical deficits and behaviors that affect risk of falls    -  [place] fall precautions as indicated by assessment   - Educate patient/family on patient safety including physical limitations  - Instruct patient to call for assistance with activity based on assessment  - Modify environment to reduce risk of injury  - Consider OT/PT consult to assist with strengthening/mobility  Outcome: Progressing     Problem: Prexisting or High Potential for Compromised Skin Integrity  Goal: Skin integrity is maintained or improved  Description: INTERVENTIONS:  - Identify patients at risk for skin breakdown  - Assess and monitor skin integrity  - Assess and monitor nutrition and hydration status  - Monitor labs   - Assess for incontinence   - Turn and reposition patient  - Assist with mobility/ambulation  - Relieve pressure over bony prominences  - Avoid friction and shearing  - Provide appropriate hygiene as needed including keeping skin clean and dry  - Evaluate need for skin moisturizer/barrier cream  - Collaborate with interdisciplinary team   - Patient/family teaching  - Consider wound care consult   Outcome: Progressing     Problem: PAIN - ADULT  Goal: Verbalizes/displays adequate comfort level or baseline comfort level  Description: Interventions:  - Encourage patient to monitor pain and request assistance  - Assess pain using appropriate pain scale  - Administer analgesics based on type and severity of pain and evaluate response  - Implement non-pharmacological measures as appropriate and evaluate response  - Consider cultural and social influences on pain and pain management  - Notify physician/advanced practitioner if interventions unsuccessful or patient reports new pain  Outcome: Progressing     Problem: INFECTION - ADULT  Goal: Absence or prevention of progression during hospitalization  Description: INTERVENTIONS:  - Assess and monitor for signs and symptoms of infection  - Monitor lab/diagnostic results  - Monitor all insertion sites, i e  indwelling lines, tubes, and drains  - Monitor endotracheal if appropriate and nasal secretions for changes in amount and color  - [place] appropriate cooling/warming therapies per order  - Administer medications as ordered  - Instruct and encourage patient and family to use good hand hygiene technique  - Identify and instruct in appropriate isolation precautions for identified infection/condition  Outcome: Progressing  Goal: Absence of fever/infection during neutropenic period  Description: INTERVENTIONS:  - Monitor WBC    Outcome: Progressing     Problem: SAFETY ADULT  Goal: Patient will remain free of falls  Description: INTERVENTIONS:  - Assess patient frequently for physical needs  -  Identify cognitive and physical deficits and behaviors that affect risk of falls    -  [place] fall precautions as indicated by assessment   - Educate patient/family on patient safety including physical limitations  - Instruct patient to call for assistance with activity based on assessment  - Modify environment to reduce risk of injury  - Consider OT/PT consult to assist with strengthening/mobility  Outcome: Progressing  Goal: Maintain or return to baseline ADL function  Description: INTERVENTIONS:  -  Assess patient's ability to carry out ADLs; assess patient's baseline for ADL function and identify physical deficits which impact ability to perform ADLs (bathing, care of mouth/teeth, toileting, grooming, dressing, etc )  - Assess/evaluate cause of self-care deficits   - Assess range of motion  - Assess patient's mobility; develop plan if impaired  - Assess patient's need for assistive devices and provide as appropriate  - Encourage maximum independence but intervene and supervise when necessary  - Involve family in performance of ADLs  - Assess for home care needs following discharge   - Consider OT consult to assist with ADL evaluation and planning for discharge  - Provide patient education as appropriate  Outcome: Progressing  Goal: Maintain or return mobility status to optimal level  Description: INTERVENTIONS:  - Assess patient's baseline mobility status (ambulation, transfers, stairs, etc )    - Identify cognitive and physical deficits and behaviors that affect mobility  - Identify mobility aids required to assist with transfers and/or ambulation (gait belt, sit-to-stand, lift, walker, cane, etc )  - [place] fall precautions as indicated by assessment  - Record patient progress and toleration of activity level on Mobility SBAR; progress patient to next Phase/Stage  - Instruct patient to call for assistance with activity based on assessment  - Consider rehabilitation consult to assist with strengthening/weightbearing, etc   Outcome: Progressing     Problem: DISCHARGE PLANNING  Goal: Discharge to home or other facility with appropriate resources  Description: INTERVENTIONS:  - Identify barriers to discharge w/patient and caregiver  - Arrange for needed discharge resources and transportation as appropriate  - Identify discharge learning needs (meds, wound care, etc )  - Arrange for interpretive services to assist at discharge as needed  - Refer to Case Management Department for coordinating discharge planning if the patient needs post-hospital services based on physician/advanced practitioner order or complex needs related to functional status, cognitive ability, or social support system  Outcome: Progressing     Problem: Knowledge Deficit  Goal: Patient/family/caregiver demonstrates understanding of disease process, treatment plan, medications, and discharge instructions  Description: Complete learning assessment and assess knowledge base    Interventions:  - Provide teaching at level of understanding  - Provide teaching via preferred learning methods  Outcome: Progressing     Problem: Nutrition/Hydration-ADULT  Goal: Nutrient/Hydration intake appropriate for improving, restoring or maintaining nutritional needs  Description: Monitor and assess patient's nutrition/hydration status for malnutrition  Collaborate with interdisciplinary team and initiate plan and interventions as ordered  Monitor patient's weight and dietary intake as ordered or per policy  Utilize nutrition screening tool and intervene as necessary  Determine patient's food preferences and provide high-protein, high-caloric foods as appropriate       INTERVENTIONS:  - Monitor oral intake, urinary output, labs, and treatment plans  - Assess nutrition and hydration status and recommend course of action  - Evaluate amount of meals eaten  - Assist patient with eating if necessary   - Allow adequate time for meals  - Recommend/ encourage appropriate diets, oral nutritional supplements, and vitamin/mineral supplements  - Order, calculate, and assess calorie counts as needed  - Recommend, monitor, and adjust tube feedings and TPN/PPN based on assessed needs  - Assess need for intravenous fluids  - Provide specific nutrition/hydration education as appropriate  - Include patient/family/caregiver in decisions related to nutrition  Outcome: Progressing

## 2021-06-15 ENCOUNTER — TRANSITIONAL CARE MANAGEMENT (OUTPATIENT)
Dept: FAMILY MEDICINE CLINIC | Facility: CLINIC | Age: 76
End: 2021-06-15

## 2021-06-15 NOTE — PLAN OF CARE
Problem: Potential for Falls  Goal: Patient will remain free of falls  Description: INTERVENTIONS:  - Assess patient frequently for physical needs  -  Identify cognitive and physical deficits and behaviors that affect risk of falls    -  [place] fall precautions as indicated by assessment   - Educate patient/family on patient safety including physical limitations  - Instruct patient to call for assistance with activity based on assessment  - Modify environment to reduce risk of injury  - Consider OT/PT consult to assist with strengthening/mobility  Outcome: Adequate for Discharge

## 2021-06-15 NOTE — UTILIZATION REVIEW
Notification of Discharge   This is a Notification of Discharge from our facility [address] [place] [address]  Please be advised that this patient has been discharge from our facility  Below you will find the admission and discharge date and time including the patients disposition  UTILIZATION REVIEW CONTACT:  [place]ty [place]  Utilization   Network Utilization Review Department  Phone: [phone] x carefully listen to the prompts  All voicemails are confidential   Email: [email or web address]     PHYSICIAN ADVISORY SERVICES:  FOR QCVY-HJ-HHTA REVIEW - MEDICAL NECESSITY DENIAL  Phone: [phone]  Fax: [phone]  Email: [email or web address]     PRESENTATION DATE: 6/7/2021  7:43 PM  OBERVATION ADMISSION DATE:   INPATIENT ADMISSION DATE: 6/7/21 10:02 PM   DISCHARGE DATE: 6/14/2021  8:18 PM  DISPOSITION: Home with [place] with [address] INFORMATION:  Send all requests for admission clinical reviews, approved or denied determinations and any other requests to dedicated fax number below belonging to the campus where the patient is receiving treatment   List of dedicated fax numbers:  [address] [place] [address] DENIALS (Administrative/Medical Necessity) [phone]   [address] [place] [address] (Maternity/NICU/Pediatrics) [phone]   Saurav Ricks [phone]   Particia Notice [phone]   Jose Rain [phone]   [facility] [address] [facility] [phone]   [facility]  [phone]   [address] [facility] [address] [facility]  [address] [facility] [address] [facility] [phone]

## 2021-08-08 ENCOUNTER — TELEPHONE (OUTPATIENT)
Dept: OTHER | Facility: OTHER | Age: 76
End: 2021-08-08

## 2021-08-08 NOTE — TELEPHONE ENCOUNTER
Lab Result: Aerobic wound culture left leg many mrsa and beta hemolytic streptococcus group b   Date/Time Drawn: 8/5   Ordering Provider: Dr Nivia Jade Name: Merritt Alfredo       The following critical/stat result was read back to the lab as stated above and [employer] to the on-call provider

## 2021-10-12 DIAGNOSIS — G89.29 OTHER CHRONIC PAIN: ICD-10-CM

## 2021-10-12 RX ORDER — GABAPENTIN 300 MG/1
600 CAPSULE ORAL
Qty: 90 CAPSULE | Refills: 1 | Status: SHIPPED | OUTPATIENT
Start: 2021-10-12 | End: 2022-01-25 | Stop reason: SDUPTHER

## 2021-10-20 DIAGNOSIS — L03.90 WOUND CELLULITIS: ICD-10-CM

## 2021-10-26 ENCOUNTER — TELEPHONE (OUTPATIENT)
Dept: FAMILY MEDICINE CLINIC | Facility: CLINIC | Age: 76
End: 2021-10-26

## 2021-10-29 ENCOUNTER — PATIENT OUTREACH (OUTPATIENT)
Dept: FAMILY MEDICINE CLINIC | Facility: CLINIC | Age: 76
End: 2021-10-29

## 2021-11-03 ENCOUNTER — OFFICE VISIT (OUTPATIENT)
Dept: FAMILY MEDICINE CLINIC | Facility: CLINIC | Age: 76
End: 2021-11-03
Payer: COMMERCIAL

## 2021-11-03 ENCOUNTER — HOSPITAL ENCOUNTER (INPATIENT)
Facility: HOSPITAL | Age: 76
LOS: 1 days | Discharge: HOME HEALTH | DRG: 603 | End: 2021-11-05
Attending: EMERGENCY MEDICINE | Admitting: FAMILY MEDICINE
Payer: COMMERCIAL

## 2021-11-03 VITALS
WEIGHT: 129 LBS | SYSTOLIC BLOOD PRESSURE: 108 MMHG | DIASTOLIC BLOOD PRESSURE: 60 MMHG | OXYGEN SATURATION: 99 % | TEMPERATURE: 98.4 F | BODY MASS INDEX: 22.86 KG/M2 | HEIGHT: 63 IN | RESPIRATION RATE: 16 BRPM | HEART RATE: 89 BPM

## 2021-11-03 DIAGNOSIS — E43 SEVERE MALNUTRITION (HCC): ICD-10-CM

## 2021-11-03 DIAGNOSIS — I10 ESSENTIAL (PRIMARY) HYPERTENSION: ICD-10-CM

## 2021-11-03 DIAGNOSIS — I73.9 PERIPHERAL VASCULAR DISEASE OF EXTREMITY (HCC): ICD-10-CM

## 2021-11-03 DIAGNOSIS — S81.801S OPEN WOUND OF RIGHT LOWER LEG, SEQUELA: ICD-10-CM

## 2021-11-03 DIAGNOSIS — L03.116 CELLULITIS OF LEFT LOWER LEG: Primary | ICD-10-CM

## 2021-11-03 DIAGNOSIS — L08.9 INFECTED ULCER OF SKIN (HCC): ICD-10-CM

## 2021-11-03 DIAGNOSIS — S81.802S OPEN WOUND OF LEFT LOWER LEG, SEQUELA: ICD-10-CM

## 2021-11-03 DIAGNOSIS — L03.90 WOUND CELLULITIS: Primary | ICD-10-CM

## 2021-11-03 DIAGNOSIS — Z75.1 NEED FOR TRANSFER TO ANOTHER FACILITY: ICD-10-CM

## 2021-11-03 DIAGNOSIS — L98.499 INFECTED ULCER OF SKIN (HCC): ICD-10-CM

## 2021-11-03 DIAGNOSIS — D50.9 MICROCYTIC ANEMIA: ICD-10-CM

## 2021-11-03 LAB
ALBUMIN SERPL BCP-MCNC: 2.8 G/DL (ref 3.5–5)
ALP SERPL-CCNC: 98 U/L (ref 46–116)
ALT SERPL W P-5'-P-CCNC: 8 U/L (ref 12–78)
ANION GAP SERPL CALCULATED.3IONS-SCNC: 9 MMOL/L (ref 4–13)
APTT PPP: 33 SECONDS (ref 23–37)
AST SERPL W P-5'-P-CCNC: 15 U/L (ref 5–45)
BASOPHILS # BLD AUTO: 0.04 THOUSANDS/ΜL (ref 0–0.1)
BASOPHILS NFR BLD AUTO: 0 % (ref 0–1)
BILIRUB SERPL-MCNC: 0.29 MG/DL (ref 0.2–1)
BUN SERPL-MCNC: 14 MG/DL (ref 5–25)
CALCIUM ALBUM COR SERPL-MCNC: 9.2 MG/DL (ref 8.3–10.1)
CALCIUM SERPL-MCNC: 8.2 MG/DL (ref 8.3–10.1)
CHLORIDE SERPL-SCNC: 108 MMOL/L (ref 100–108)
CO2 SERPL-SCNC: 26 MMOL/L (ref 21–32)
CREAT SERPL-MCNC: 0.66 MG/DL (ref 0.6–1.3)
EOSINOPHIL # BLD AUTO: 0.2 THOUSAND/ΜL (ref 0–0.61)
EOSINOPHIL NFR BLD AUTO: 2 % (ref 0–6)
ERYTHROCYTE [DISTWIDTH] IN BLOOD BY AUTOMATED COUNT: 18.4 % (ref 11.6–15.1)
GFR SERPL CREATININE-BSD FRML MDRD: 85 ML/MIN/1.73SQ M
GLUCOSE SERPL-MCNC: 104 MG/DL (ref 65–140)
HCT VFR BLD AUTO: 41 % (ref 34.8–46.1)
HGB BLD-MCNC: 10.9 G/DL (ref 11.5–15.4)
IMM GRANULOCYTES # BLD AUTO: 0.27 THOUSAND/UL (ref 0–0.2)
IMM GRANULOCYTES NFR BLD AUTO: 2 % (ref 0–2)
INR PPP: 1.26 (ref 0.84–1.19)
LACTATE SERPL-SCNC: 0.8 MMOL/L (ref 0.5–2)
LYMPHOCYTES # BLD AUTO: 1.01 THOUSANDS/ΜL (ref 0.6–4.47)
LYMPHOCYTES NFR BLD AUTO: 8 % (ref 14–44)
MCH RBC QN AUTO: 19.7 PG (ref 26.8–34.3)
MCHC RBC AUTO-ENTMCNC: 26.6 G/DL (ref 31.4–37.4)
MCV RBC AUTO: 74 FL (ref 82–98)
MONOCYTES # BLD AUTO: 2.51 THOUSAND/ΜL (ref 0.17–1.22)
MONOCYTES NFR BLD AUTO: 20 % (ref 4–12)
NEUTROPHILS # BLD AUTO: 8.55 THOUSANDS/ΜL (ref 1.85–7.62)
NEUTS SEG NFR BLD AUTO: 68 % (ref 43–75)
NRBC BLD AUTO-RTO: 0 /100 WBCS
PLATELET # BLD AUTO: 754 THOUSANDS/UL (ref 149–390)
PMV BLD AUTO: 10.2 FL (ref 8.9–12.7)
POTASSIUM SERPL-SCNC: 4.4 MMOL/L (ref 3.5–5.3)
PROCALCITONIN SERPL-MCNC: <0.05 NG/ML
PROT SERPL-MCNC: 7.8 G/DL (ref 6.4–8.2)
PROTHROMBIN TIME: 15.3 SECONDS (ref 11.6–14.5)
RBC # BLD AUTO: 5.53 MILLION/UL (ref 3.81–5.12)
SODIUM SERPL-SCNC: 143 MMOL/L (ref 136–145)
WBC # BLD AUTO: 12.58 THOUSAND/UL (ref 4.31–10.16)

## 2021-11-03 PROCEDURE — 99285 EMERGENCY DEPT VISIT HI MDM: CPT | Performed by: EMERGENCY MEDICINE

## 2021-11-03 PROCEDURE — 85610 PROTHROMBIN TIME: CPT | Performed by: EMERGENCY MEDICINE

## 2021-11-03 PROCEDURE — 84145 PROCALCITONIN (PCT): CPT | Performed by: EMERGENCY MEDICINE

## 2021-11-03 PROCEDURE — 80053 COMPREHEN METABOLIC PANEL: CPT | Performed by: EMERGENCY MEDICINE

## 2021-11-03 PROCEDURE — 87070 CULTURE OTHR SPECIMN AEROBIC: CPT | Performed by: EMERGENCY MEDICINE

## 2021-11-03 PROCEDURE — 87147 CULTURE TYPE IMMUNOLOGIC: CPT | Performed by: EMERGENCY MEDICINE

## 2021-11-03 PROCEDURE — 85730 THROMBOPLASTIN TIME PARTIAL: CPT | Performed by: EMERGENCY MEDICINE

## 2021-11-03 PROCEDURE — 36415 COLL VENOUS BLD VENIPUNCTURE: CPT

## 2021-11-03 PROCEDURE — 99214 OFFICE O/P EST MOD 30 MIN: CPT | Performed by: NURSE PRACTITIONER

## 2021-11-03 PROCEDURE — 96365 THER/PROPH/DIAG IV INF INIT: CPT

## 2021-11-03 PROCEDURE — 99284 EMERGENCY DEPT VISIT MOD MDM: CPT

## 2021-11-03 PROCEDURE — 85025 COMPLETE CBC W/AUTO DIFF WBC: CPT | Performed by: EMERGENCY MEDICINE

## 2021-11-03 PROCEDURE — 83605 ASSAY OF LACTIC ACID: CPT | Performed by: EMERGENCY MEDICINE

## 2021-11-03 PROCEDURE — 87040 BLOOD CULTURE FOR BACTERIA: CPT | Performed by: EMERGENCY MEDICINE

## 2021-11-03 PROCEDURE — 99220 PR INITIAL OBSERVATION CARE/DAY 70 MINUTES: CPT | Performed by: FAMILY MEDICINE

## 2021-11-03 PROCEDURE — 87205 SMEAR GRAM STAIN: CPT | Performed by: EMERGENCY MEDICINE

## 2021-11-03 RX ORDER — ACETAMINOPHEN 325 MG/1
650 TABLET ORAL EVERY 6 HOURS PRN
Status: DISCONTINUED | OUTPATIENT
Start: 2021-11-03 | End: 2021-11-05 | Stop reason: HOSPADM

## 2021-11-03 RX ORDER — TRAZODONE HYDROCHLORIDE 100 MG/1
100 TABLET ORAL
Status: DISCONTINUED | OUTPATIENT
Start: 2021-11-03 | End: 2021-11-05 | Stop reason: HOSPADM

## 2021-11-03 RX ORDER — TRAMADOL HYDROCHLORIDE 50 MG/1
100 TABLET ORAL DAILY PRN
Status: DISCONTINUED | OUTPATIENT
Start: 2021-11-03 | End: 2021-11-05 | Stop reason: HOSPADM

## 2021-11-03 RX ORDER — LISINOPRIL 20 MG/1
40 TABLET ORAL DAILY
Status: DISCONTINUED | OUTPATIENT
Start: 2021-11-04 | End: 2021-11-05 | Stop reason: HOSPADM

## 2021-11-03 RX ORDER — ATENOLOL 25 MG/1
25 TABLET ORAL DAILY
Status: DISCONTINUED | OUTPATIENT
Start: 2021-11-04 | End: 2021-11-05 | Stop reason: HOSPADM

## 2021-11-03 RX ORDER — HYDROCHLOROTHIAZIDE 12.5 MG/1
12.5 TABLET ORAL DAILY
Status: DISCONTINUED | OUTPATIENT
Start: 2021-11-04 | End: 2021-11-05 | Stop reason: HOSPADM

## 2021-11-03 RX ORDER — VANCOMYCIN HYDROCHLORIDE 1 G/200ML
15 INJECTION, SOLUTION INTRAVENOUS EVERY 12 HOURS
Status: DISCONTINUED | OUTPATIENT
Start: 2021-11-04 | End: 2021-11-03

## 2021-11-03 RX ORDER — GABAPENTIN 300 MG/1
600 CAPSULE ORAL
Status: DISCONTINUED | OUTPATIENT
Start: 2021-11-03 | End: 2021-11-05 | Stop reason: HOSPADM

## 2021-11-03 RX ORDER — VANCOMYCIN HYDROCHLORIDE 1 G/200ML
15 INJECTION, SOLUTION INTRAVENOUS ONCE
Status: COMPLETED | OUTPATIENT
Start: 2021-11-03 | End: 2021-11-03

## 2021-11-03 RX ORDER — ATORVASTATIN CALCIUM 20 MG/1
20 TABLET, FILM COATED ORAL
Status: DISCONTINUED | OUTPATIENT
Start: 2021-11-04 | End: 2021-11-05 | Stop reason: HOSPADM

## 2021-11-03 RX ADMIN — CEFEPIME HYDROCHLORIDE 2000 MG: 2 INJECTION, POWDER, FOR SOLUTION INTRAVENOUS at 15:01

## 2021-11-03 RX ADMIN — VANCOMYCIN HYDROCHLORIDE 1000 MG: 1 INJECTION, SOLUTION INTRAVENOUS at 18:08

## 2021-11-03 RX ADMIN — GABAPENTIN 600 MG: 300 CAPSULE ORAL at 22:47

## 2021-11-04 LAB
ANION GAP SERPL CALCULATED.3IONS-SCNC: 5 MMOL/L (ref 4–13)
ANISOCYTOSIS BLD QL SMEAR: PRESENT
BASOPHILS # BLD MANUAL: 0 THOUSAND/UL (ref 0–0.1)
BASOPHILS NFR MAR MANUAL: 0 % (ref 0–1)
BUN SERPL-MCNC: 10 MG/DL (ref 5–25)
CALCIUM SERPL-MCNC: 7.9 MG/DL (ref 8.3–10.1)
CHLORIDE SERPL-SCNC: 110 MMOL/L (ref 100–108)
CO2 SERPL-SCNC: 28 MMOL/L (ref 21–32)
CREAT SERPL-MCNC: 0.64 MG/DL (ref 0.6–1.3)
EOSINOPHIL # BLD MANUAL: 0.19 THOUSAND/UL (ref 0–0.4)
EOSINOPHIL NFR BLD MANUAL: 2 % (ref 0–6)
ERYTHROCYTE [DISTWIDTH] IN BLOOD BY AUTOMATED COUNT: 18.1 % (ref 11.6–15.1)
GFR SERPL CREATININE-BSD FRML MDRD: 86 ML/MIN/1.73SQ M
GLUCOSE SERPL-MCNC: 81 MG/DL (ref 65–140)
HCT VFR BLD AUTO: 37.4 % (ref 34.8–46.1)
HGB BLD-MCNC: 9.9 G/DL (ref 11.5–15.4)
HYPERCHROMIA BLD QL SMEAR: PRESENT
LYMPHOCYTES # BLD AUTO: 0.97 THOUSAND/UL (ref 0.6–4.47)
LYMPHOCYTES # BLD AUTO: 10 % (ref 14–44)
MCH RBC QN AUTO: 19.5 PG (ref 26.8–34.3)
MCHC RBC AUTO-ENTMCNC: 26.5 G/DL (ref 31.4–37.4)
MCV RBC AUTO: 74 FL (ref 82–98)
MONOCYTES # BLD AUTO: 1.36 THOUSAND/UL (ref 0–1.22)
MONOCYTES NFR BLD: 14 % (ref 4–12)
NEUTROPHILS # BLD MANUAL: 7.18 THOUSAND/UL (ref 1.85–7.62)
NEUTS SEG NFR BLD AUTO: 74 % (ref 43–75)
PLATELET # BLD AUTO: 658 THOUSANDS/UL (ref 149–390)
PLATELET BLD QL SMEAR: ABNORMAL
PMV BLD AUTO: 9.9 FL (ref 8.9–12.7)
POIKILOCYTOSIS BLD QL SMEAR: PRESENT
POTASSIUM SERPL-SCNC: 4.1 MMOL/L (ref 3.5–5.3)
PROCALCITONIN SERPL-MCNC: <0.05 NG/ML
RBC # BLD AUTO: 5.07 MILLION/UL (ref 3.81–5.12)
SODIUM SERPL-SCNC: 143 MMOL/L (ref 136–145)
WBC # BLD AUTO: 9.7 THOUSAND/UL (ref 4.31–10.16)

## 2021-11-04 PROCEDURE — 85027 COMPLETE CBC AUTOMATED: CPT | Performed by: FAMILY MEDICINE

## 2021-11-04 PROCEDURE — 80048 BASIC METABOLIC PNL TOTAL CA: CPT | Performed by: FAMILY MEDICINE

## 2021-11-04 PROCEDURE — 84145 PROCALCITONIN (PCT): CPT | Performed by: EMERGENCY MEDICINE

## 2021-11-04 PROCEDURE — 99233 SBSQ HOSP IP/OBS HIGH 50: CPT | Performed by: FAMILY MEDICINE

## 2021-11-04 PROCEDURE — 85007 BL SMEAR W/DIFF WBC COUNT: CPT | Performed by: FAMILY MEDICINE

## 2021-11-04 PROCEDURE — 36415 COLL VENOUS BLD VENIPUNCTURE: CPT | Performed by: EMERGENCY MEDICINE

## 2021-11-04 PROCEDURE — 99222 1ST HOSP IP/OBS MODERATE 55: CPT | Performed by: INTERNAL MEDICINE

## 2021-11-04 RX ADMIN — VANCOMYCIN HYDROCHLORIDE 750 MG: 750 INJECTION, SOLUTION INTRAVENOUS at 07:17

## 2021-11-04 RX ADMIN — LISINOPRIL 40 MG: 20 TABLET ORAL at 08:43

## 2021-11-04 RX ADMIN — GABAPENTIN 600 MG: 300 CAPSULE ORAL at 22:05

## 2021-11-04 RX ADMIN — ACETAMINOPHEN 650 MG: 325 TABLET, FILM COATED ORAL at 13:03

## 2021-11-04 RX ADMIN — HYDROCHLOROTHIAZIDE 12.5 MG: 12.5 TABLET ORAL at 08:44

## 2021-11-04 RX ADMIN — ENOXAPARIN SODIUM 40 MG: 40 INJECTION SUBCUTANEOUS at 08:41

## 2021-11-04 RX ADMIN — VANCOMYCIN HYDROCHLORIDE 750 MG: 750 INJECTION, SOLUTION INTRAVENOUS at 17:34

## 2021-11-04 RX ADMIN — ATORVASTATIN CALCIUM 20 MG: 20 TABLET, FILM COATED ORAL at 17:33

## 2021-11-04 RX ADMIN — ACETAMINOPHEN 650 MG: 325 TABLET, FILM COATED ORAL at 20:02

## 2021-11-04 RX ADMIN — ATENOLOL 25 MG: 25 TABLET ORAL at 08:44

## 2021-11-05 VITALS
HEART RATE: 75 BPM | TEMPERATURE: 97.3 F | DIASTOLIC BLOOD PRESSURE: 50 MMHG | BODY MASS INDEX: 22.85 KG/M2 | RESPIRATION RATE: 18 BRPM | HEIGHT: 63 IN | SYSTOLIC BLOOD PRESSURE: 96 MMHG | OXYGEN SATURATION: 97 %

## 2021-11-05 LAB
ANION GAP SERPL CALCULATED.3IONS-SCNC: 10 MMOL/L (ref 4–13)
BUN SERPL-MCNC: 12 MG/DL (ref 5–25)
CALCIUM SERPL-MCNC: 8.1 MG/DL (ref 8.3–10.1)
CHLORIDE SERPL-SCNC: 109 MMOL/L (ref 100–108)
CO2 SERPL-SCNC: 26 MMOL/L (ref 21–32)
CREAT SERPL-MCNC: 0.68 MG/DL (ref 0.6–1.3)
ERYTHROCYTE [DISTWIDTH] IN BLOOD BY AUTOMATED COUNT: 17.9 % (ref 11.6–15.1)
GFR SERPL CREATININE-BSD FRML MDRD: 85 ML/MIN/1.73SQ M
GLUCOSE SERPL-MCNC: 78 MG/DL (ref 65–140)
HCT VFR BLD AUTO: 35.5 % (ref 34.8–46.1)
HGB BLD-MCNC: 9.4 G/DL (ref 11.5–15.4)
MCH RBC QN AUTO: 19.6 PG (ref 26.8–34.3)
MCHC RBC AUTO-ENTMCNC: 26.5 G/DL (ref 31.4–37.4)
MCV RBC AUTO: 74 FL (ref 82–98)
NRBC BLD AUTO-RTO: 0 /100 WBCS
PLATELET # BLD AUTO: 528 THOUSANDS/UL (ref 149–390)
PMV BLD AUTO: 10.9 FL (ref 8.9–12.7)
POTASSIUM SERPL-SCNC: 4.1 MMOL/L (ref 3.5–5.3)
RBC # BLD AUTO: 4.79 MILLION/UL (ref 3.81–5.12)
SODIUM SERPL-SCNC: 145 MMOL/L (ref 136–145)
VANCOMYCIN TROUGH SERPL-MCNC: 13.9 UG/ML (ref 10–20)
WBC # BLD AUTO: 9.49 THOUSAND/UL (ref 4.31–10.16)

## 2021-11-05 PROCEDURE — 99239 HOSP IP/OBS DSCHRG MGMT >30: CPT | Performed by: PHYSICIAN ASSISTANT

## 2021-11-05 PROCEDURE — 80048 BASIC METABOLIC PNL TOTAL CA: CPT | Performed by: FAMILY MEDICINE

## 2021-11-05 PROCEDURE — 99232 SBSQ HOSP IP/OBS MODERATE 35: CPT | Performed by: INTERNAL MEDICINE

## 2021-11-05 PROCEDURE — 80202 ASSAY OF VANCOMYCIN: CPT | Performed by: FAMILY MEDICINE

## 2021-11-05 PROCEDURE — NC001 PR NO CHARGE: Performed by: PHYSICIAN ASSISTANT

## 2021-11-05 PROCEDURE — 85027 COMPLETE CBC AUTOMATED: CPT | Performed by: FAMILY MEDICINE

## 2021-11-05 RX ORDER — DOXYCYCLINE 100 MG/1
100 CAPSULE ORAL 2 TIMES DAILY
Qty: 10 CAPSULE | Refills: 0 | Status: SHIPPED | OUTPATIENT
Start: 2021-11-05 | End: 2021-11-30

## 2021-11-05 RX ORDER — VANCOMYCIN HYDROCHLORIDE 1 G/200ML
15 INJECTION, SOLUTION INTRAVENOUS EVERY 12 HOURS
Status: DISCONTINUED | OUTPATIENT
Start: 2021-11-05 | End: 2021-11-05 | Stop reason: HOSPADM

## 2021-11-05 RX ADMIN — LISINOPRIL 40 MG: 20 TABLET ORAL at 08:48

## 2021-11-05 RX ADMIN — ENOXAPARIN SODIUM 40 MG: 40 INJECTION SUBCUTANEOUS at 08:50

## 2021-11-05 RX ADMIN — HYDROCHLOROTHIAZIDE 12.5 MG: 12.5 TABLET ORAL at 08:48

## 2021-11-05 RX ADMIN — VANCOMYCIN HYDROCHLORIDE 750 MG: 750 INJECTION, SOLUTION INTRAVENOUS at 05:06

## 2021-11-05 RX ADMIN — ATENOLOL 25 MG: 25 TABLET ORAL at 08:49

## 2021-11-06 LAB
BACTERIA WND AEROBE CULT: ABNORMAL
GRAM STN SPEC: ABNORMAL

## 2021-11-08 PROBLEM — E43 SEVERE MALNUTRITION (HCC): Status: ACTIVE | Noted: 2021-11-08

## 2021-11-08 PROBLEM — Z75.1 NEED FOR TRANSFER TO ANOTHER FACILITY: Status: ACTIVE | Noted: 2021-11-08

## 2021-11-08 PROBLEM — I73.9 PERIPHERAL VASCULAR DISEASE OF EXTREMITY (HCC): Status: ACTIVE | Noted: 2021-11-08

## 2021-11-08 LAB
BACTERIA BLD CULT: NORMAL
BACTERIA BLD CULT: NORMAL

## 2021-11-10 ENCOUNTER — TRANSITIONAL CARE MANAGEMENT (OUTPATIENT)
Dept: FAMILY MEDICINE CLINIC | Facility: CLINIC | Age: 76
End: 2021-11-10

## 2021-11-11 ENCOUNTER — OFFICE VISIT (OUTPATIENT)
Dept: FAMILY MEDICINE CLINIC | Facility: CLINIC | Age: 76
End: 2021-11-11
Payer: COMMERCIAL

## 2021-11-11 VITALS
TEMPERATURE: 96.9 F | HEIGHT: 63 IN | SYSTOLIC BLOOD PRESSURE: 82 MMHG | OXYGEN SATURATION: 99 % | BODY MASS INDEX: 22.85 KG/M2 | HEART RATE: 87 BPM | DIASTOLIC BLOOD PRESSURE: 52 MMHG

## 2021-11-11 DIAGNOSIS — L03.90 WOUND CELLULITIS: Primary | ICD-10-CM

## 2021-11-11 DIAGNOSIS — F11.20 CONTINUOUS OPIOID DEPENDENCE (HCC): ICD-10-CM

## 2021-11-11 PROCEDURE — 1111F DSCHRG MED/CURRENT MED MERGE: CPT | Performed by: NURSE PRACTITIONER

## 2021-11-11 PROCEDURE — 99496 TRANSJ CARE MGMT HIGH F2F 7D: CPT | Performed by: NURSE PRACTITIONER

## 2021-11-11 RX ORDER — GAUZE BANDAGE 4" X 4"
30 SPONGE TOPICAL 2 TIMES DAILY
Qty: 30 EACH | Refills: 0 | Status: SHIPPED | OUTPATIENT
Start: 2021-11-11

## 2021-11-11 RX ORDER — B-COMPLEX WITH VITAMIN C
1 TABLET ORAL DAILY
COMMUNITY
Start: 2021-10-01

## 2021-11-11 RX ORDER — CHOLECALCIFEROL (VITAMIN D3) 25 MCG
1 TABLET,CHEWABLE ORAL DAILY
COMMUNITY
Start: 2021-09-08

## 2021-11-11 RX ORDER — NYSTATIN 100000 U/G
1 CREAM TOPICAL 2 TIMES DAILY
Status: ON HOLD | COMMUNITY
Start: 2021-09-30 | End: 2022-03-04 | Stop reason: CLARIF

## 2021-11-11 RX ORDER — GENTAMICIN SULFATE 1 MG/G
OINTMENT TOPICAL 2 TIMES DAILY
Status: ON HOLD | COMMUNITY
Start: 2021-09-30 | End: 2022-03-04 | Stop reason: CLARIF

## 2021-11-20 ENCOUNTER — NURSE TRIAGE (OUTPATIENT)
Dept: OTHER | Facility: OTHER | Age: 76
End: 2021-11-20

## 2021-11-20 ENCOUNTER — TELEPHONE (OUTPATIENT)
Dept: OTHER | Facility: OTHER | Age: 76
End: 2021-11-20

## 2021-11-22 PROBLEM — L03.90 WOUND CELLULITIS: Status: ACTIVE | Noted: 2021-11-22

## 2021-11-23 ENCOUNTER — TELEPHONE (OUTPATIENT)
Dept: FAMILY MEDICINE CLINIC | Facility: CLINIC | Age: 76
End: 2021-11-23

## 2021-11-30 ENCOUNTER — TELEPHONE (OUTPATIENT)
Dept: FAMILY MEDICINE CLINIC | Facility: CLINIC | Age: 76
End: 2021-11-30

## 2021-12-13 ENCOUNTER — TELEPHONE (OUTPATIENT)
Dept: FAMILY MEDICINE CLINIC | Facility: CLINIC | Age: 76
End: 2021-12-13

## 2021-12-17 ENCOUNTER — TELEPHONE (OUTPATIENT)
Dept: FAMILY MEDICINE CLINIC | Facility: CLINIC | Age: 76
End: 2021-12-17

## 2022-01-25 DIAGNOSIS — G89.29 OTHER CHRONIC PAIN: ICD-10-CM

## 2022-01-25 RX ORDER — GABAPENTIN 300 MG/1
600 CAPSULE ORAL
Qty: 90 CAPSULE | Refills: 1 | Status: SHIPPED | OUTPATIENT
Start: 2022-01-25 | End: 2022-03-16 | Stop reason: SDUPTHER

## 2022-02-28 ENCOUNTER — OFFICE VISIT (OUTPATIENT)
Dept: WOUND CARE | Facility: CLINIC | Age: 77
DRG: 603 | End: 2022-02-28
Payer: COMMERCIAL

## 2022-02-28 ENCOUNTER — HOSPITAL ENCOUNTER (INPATIENT)
Facility: HOSPITAL | Age: 77
LOS: 4 days | Discharge: HOME HEALTH | DRG: 603 | End: 2022-03-04
Attending: EMERGENCY MEDICINE | Admitting: STUDENT IN AN ORGANIZED HEALTH CARE EDUCATION/TRAINING PROGRAM
Payer: COMMERCIAL

## 2022-02-28 ENCOUNTER — APPOINTMENT (EMERGENCY)
Dept: RADIOLOGY | Facility: HOSPITAL | Age: 77
DRG: 603 | End: 2022-02-28
Payer: COMMERCIAL

## 2022-02-28 DIAGNOSIS — S81.802A OPEN WOUND OF LEFT LOWER LEG, INITIAL ENCOUNTER: ICD-10-CM

## 2022-02-28 DIAGNOSIS — L03.115 BILATERAL LOWER LEG CELLULITIS: Primary | ICD-10-CM

## 2022-02-28 DIAGNOSIS — L03.116 CELLULITIS OF BOTH LOWER EXTREMITIES: Primary | ICD-10-CM

## 2022-02-28 DIAGNOSIS — S81.801A OPEN WOUND OF RIGHT LOWER LEG, INITIAL ENCOUNTER: ICD-10-CM

## 2022-02-28 DIAGNOSIS — L03.90 WOUND CELLULITIS: ICD-10-CM

## 2022-02-28 DIAGNOSIS — I73.9 PERIPHERAL VASCULAR DISEASE OF EXTREMITY (HCC): ICD-10-CM

## 2022-02-28 DIAGNOSIS — L03.115 CELLULITIS OF BOTH LOWER EXTREMITIES: Primary | ICD-10-CM

## 2022-02-28 DIAGNOSIS — E43 SEVERE MALNUTRITION (HCC): ICD-10-CM

## 2022-02-28 DIAGNOSIS — L03.116 BILATERAL LOWER LEG CELLULITIS: Primary | ICD-10-CM

## 2022-02-28 PROBLEM — D75.839 THROMBOCYTOSIS: Status: ACTIVE | Noted: 2022-02-28

## 2022-02-28 LAB
ALBUMIN SERPL BCP-MCNC: 2.6 G/DL (ref 3.5–5)
ALP SERPL-CCNC: 113 U/L (ref 46–116)
ALT SERPL W P-5'-P-CCNC: 15 U/L (ref 12–78)
ANION GAP SERPL CALCULATED.3IONS-SCNC: 10 MMOL/L (ref 4–13)
AST SERPL W P-5'-P-CCNC: 30 U/L (ref 5–45)
BASOPHILS # BLD AUTO: 0.05 THOUSANDS/ΜL (ref 0–0.1)
BASOPHILS NFR BLD AUTO: 0 % (ref 0–1)
BILIRUB SERPL-MCNC: 0.5 MG/DL (ref 0.2–1)
BUN SERPL-MCNC: 14 MG/DL (ref 5–25)
CALCIUM ALBUM COR SERPL-MCNC: 8.8 MG/DL (ref 8.3–10.1)
CALCIUM SERPL-MCNC: 7.7 MG/DL (ref 8.3–10.1)
CHLORIDE SERPL-SCNC: 106 MMOL/L (ref 100–108)
CO2 SERPL-SCNC: 23 MMOL/L (ref 21–32)
CREAT SERPL-MCNC: 0.59 MG/DL (ref 0.6–1.3)
EOSINOPHIL # BLD AUTO: 0.37 THOUSAND/ΜL (ref 0–0.61)
EOSINOPHIL NFR BLD AUTO: 3 % (ref 0–6)
ERYTHROCYTE [DISTWIDTH] IN BLOOD BY AUTOMATED COUNT: 17.8 % (ref 11.6–15.1)
GFR SERPL CREATININE-BSD FRML MDRD: 88 ML/MIN/1.73SQ M
GLUCOSE SERPL-MCNC: 72 MG/DL (ref 65–140)
HCT VFR BLD AUTO: 40.9 % (ref 34.8–46.1)
HGB BLD-MCNC: 11.1 G/DL (ref 11.5–15.4)
IMM GRANULOCYTES # BLD AUTO: 0.19 THOUSAND/UL (ref 0–0.2)
IMM GRANULOCYTES NFR BLD AUTO: 2 % (ref 0–2)
LACTATE SERPL-SCNC: 1.2 MMOL/L (ref 0.5–2)
LYMPHOCYTES # BLD AUTO: 1.11 THOUSANDS/ΜL (ref 0.6–4.47)
LYMPHOCYTES NFR BLD AUTO: 9 % (ref 14–44)
MCH RBC QN AUTO: 19.6 PG (ref 26.8–34.3)
MCHC RBC AUTO-ENTMCNC: 27.1 G/DL (ref 31.4–37.4)
MCV RBC AUTO: 72 FL (ref 82–98)
MONOCYTES # BLD AUTO: 2.04 THOUSAND/ΜL (ref 0.17–1.22)
MONOCYTES NFR BLD AUTO: 16 % (ref 4–12)
NEUTROPHILS # BLD AUTO: 9.27 THOUSANDS/ΜL (ref 1.85–7.62)
NEUTS SEG NFR BLD AUTO: 70 % (ref 43–75)
NRBC BLD AUTO-RTO: 0 /100 WBCS
PLATELET # BLD AUTO: 786 THOUSANDS/UL (ref 149–390)
PMV BLD AUTO: 10.3 FL (ref 8.9–12.7)
POTASSIUM SERPL-SCNC: 4 MMOL/L (ref 3.5–5.3)
PROCALCITONIN SERPL-MCNC: 0.15 NG/ML
PROT SERPL-MCNC: 7.9 G/DL (ref 6.4–8.2)
RBC # BLD AUTO: 5.67 MILLION/UL (ref 3.81–5.12)
SODIUM SERPL-SCNC: 139 MMOL/L (ref 136–145)
WBC # BLD AUTO: 13.03 THOUSAND/UL (ref 4.31–10.16)

## 2022-02-28 PROCEDURE — 99213 OFFICE O/P EST LOW 20 MIN: CPT | Performed by: ORTHOPAEDIC SURGERY

## 2022-02-28 PROCEDURE — 99223 1ST HOSP IP/OBS HIGH 75: CPT

## 2022-02-28 PROCEDURE — 99284 EMERGENCY DEPT VISIT MOD MDM: CPT

## 2022-02-28 PROCEDURE — 11045 DBRDMT SUBQ TISS EACH ADDL: CPT | Performed by: ORTHOPAEDIC SURGERY

## 2022-02-28 PROCEDURE — 11042 DBRDMT SUBQ TIS 1ST 20SQCM/<: CPT | Performed by: ORTHOPAEDIC SURGERY

## 2022-02-28 PROCEDURE — 80053 COMPREHEN METABOLIC PANEL: CPT | Performed by: PHYSICIAN ASSISTANT

## 2022-02-28 PROCEDURE — 85025 COMPLETE CBC W/AUTO DIFF WBC: CPT | Performed by: PHYSICIAN ASSISTANT

## 2022-02-28 PROCEDURE — 83605 ASSAY OF LACTIC ACID: CPT | Performed by: PHYSICIAN ASSISTANT

## 2022-02-28 PROCEDURE — 99285 EMERGENCY DEPT VISIT HI MDM: CPT | Performed by: PHYSICIAN ASSISTANT

## 2022-02-28 PROCEDURE — 36415 COLL VENOUS BLD VENIPUNCTURE: CPT | Performed by: PHYSICIAN ASSISTANT

## 2022-02-28 PROCEDURE — 99204 OFFICE O/P NEW MOD 45 MIN: CPT | Performed by: ORTHOPAEDIC SURGERY

## 2022-02-28 PROCEDURE — 96365 THER/PROPH/DIAG IV INF INIT: CPT

## 2022-02-28 PROCEDURE — 87040 BLOOD CULTURE FOR BACTERIA: CPT | Performed by: PHYSICIAN ASSISTANT

## 2022-02-28 PROCEDURE — 73630 X-RAY EXAM OF FOOT: CPT

## 2022-02-28 PROCEDURE — 84145 PROCALCITONIN (PCT): CPT

## 2022-02-28 RX ORDER — LIDOCAINE HYDROCHLORIDE 40 MG/ML
5 SOLUTION TOPICAL ONCE
Status: DISCONTINUED | OUTPATIENT
Start: 2022-02-28 | End: 2022-02-28 | Stop reason: HOSPADM

## 2022-02-28 RX ORDER — GABAPENTIN 300 MG/1
600 CAPSULE ORAL
Status: DISCONTINUED | OUTPATIENT
Start: 2022-02-28 | End: 2022-03-04 | Stop reason: HOSPADM

## 2022-02-28 RX ORDER — ACETAMINOPHEN 325 MG/1
650 TABLET ORAL EVERY 6 HOURS PRN
Status: DISCONTINUED | OUTPATIENT
Start: 2022-02-28 | End: 2022-03-04 | Stop reason: HOSPADM

## 2022-02-28 RX ORDER — CALCIUM CARBONATE 500(1250)
1 TABLET ORAL
Status: DISCONTINUED | OUTPATIENT
Start: 2022-03-01 | End: 2022-03-04 | Stop reason: HOSPADM

## 2022-02-28 RX ORDER — OXYCODONE HYDROCHLORIDE 5 MG/1
5 TABLET ORAL EVERY 4 HOURS PRN
Status: DISCONTINUED | OUTPATIENT
Start: 2022-02-28 | End: 2022-03-04 | Stop reason: HOSPADM

## 2022-02-28 RX ORDER — OXYCODONE HYDROCHLORIDE 5 MG/1
2.5 TABLET ORAL EVERY 4 HOURS PRN
Status: DISCONTINUED | OUTPATIENT
Start: 2022-02-28 | End: 2022-03-04 | Stop reason: HOSPADM

## 2022-02-28 RX ADMIN — CEFTRIAXONE SODIUM 1000 MG: 10 INJECTION, POWDER, FOR SOLUTION INTRAVENOUS at 18:43

## 2022-02-28 RX ADMIN — VANCOMYCIN HYDROCHLORIDE 1000 MG: 5 INJECTION, POWDER, LYOPHILIZED, FOR SOLUTION INTRAVENOUS at 20:07

## 2022-02-28 RX ADMIN — LIDOCAINE HYDROCHLORIDE 5 ML: 40 SOLUTION TOPICAL at 14:57

## 2022-02-28 RX ADMIN — GABAPENTIN 600 MG: 300 CAPSULE ORAL at 21:40

## 2022-02-28 NOTE — ED PROVIDER NOTES
History  Chief Complaint   Patient presents with    Wound Check     sent over from wound care for futher evaluation of b/l LE wounds  66 yo female here for possible wound infection  Sent in by her wound care team  She has a large ulcer on each extremity  Both have increased redness in the past 24 hours  Noticed increased drainage and pain from left wound  No fever or chills  No n/v  States this is a recurrent issue for her  H/o MRSA  Medical Problem  Location:  Wound infection  Severity:  Moderate  Onset quality:  Sudden  Duration:  1 day  Timing:  Constant  Progression:  Worsening  Chronicity:  Recurrent  Associated symptoms: rash    Associated symptoms: no abdominal pain, no chest pain, no cough, no ear pain, no fever, no shortness of breath, no sore throat and no vomiting        Prior to Admission Medications   Prescriptions Last Dose Informant Patient Reported? Taking?    Calcium Carb-Cholecalciferol (CALCIUM 600+D3) 600-200 MG-UNIT TABS   Yes No   Sig: Calcium 600 + D(3) 600 mg (1,500 mg)-200 unit tablet   Cyanocobalamin (B-12) 1000 MCG CAPS   Yes No   Incontinence Supply Disposable (BRIEFS OVERNIGHT MEDIUM) MISC   No No   Sig: by Does not apply route 3 (three) times a day   LORazepam (ATIVAN) 0 5 mg tablet   Yes No   Sig: lorazepam 0 5 mg tablet   Patient not taking: Reported on 2022   MAGNESIUM PO   Yes No   Si tablets   Patient not taking: Reported on 2022    Omega-3 Fatty Acids ([employer] FISH OIL) 1200 MG CAPS   Yes No   Sig: Take 1 capsule by mouth Daily   Patient not taking: Reported on 2022    Wound Dressings (Adaptic Non-Adhering Dressing) PADS   No No   Sig: Apply 30 each topically 2 (two) times a day   acetaminophen, FOR EMS ONLY, (TYLENOL) 160 mg/5 mL suspension   Yes No   Patient not taking: Reported on 2022    atenolol (TENORMIN) 25 mg tablet   Yes No   Si mg   Patient not taking: Reported on 2022    atorvastatin (LIPITOR) 20 mg tablet   Yes No   Si mg Patient not taking: Reported on 2022    baclofen 10 mg tablet   Yes No   Sig: baclofen 10 mg tablet   Patient not taking: Reported on 2022   calcium carbonate-vitamin D (OSCAL-D) 500 mg-200 units per tablet   Yes No   Sig: Take 1 tablet by mouth daily   collagenase (SANTYL) ointment   No No   Sig: Apply topically daily   Patient not taking: Reported on 2022    cyanocobalamin (VITAMIN B-12) 100 MCG tablet   Yes No   Si,000 mcg   denosumab (PROLIA) 60 mg/mL   Yes No   Sig: Prolia 60 mg/mL subcutaneous syringe   Patient not taking: Reported on 2022   enalapril (VASOTEC) 20 mg tablet   Yes No   Si mg   Patient not taking: Reported on 2022    ergocalciferol (VITAMIN D2) 50,000 units   No No   Sig: Take 1 capsule (50,000 Units total) by mouth 2 (two) times a week for 16 doses   gabapentin (NEURONTIN) 300 mg capsule   No No   Sig: Take 2 capsules (600 mg total) by mouth daily at bedtime   gentamicin (GARAMYCIN) 0 1 % ointment   Yes No   Sig: Apply topically 2 (two) times a day   Patient not taking: Reported on 2022    hydrochlorothiazide (MICROZIDE) 12 5 mg capsule   Yes No   Sig: hydrochlorothiazide 12 5 mg capsule   Patient not taking: Reported on 2022   mupirocin (BACTROBAN) 2 % ointment   No No   Sig: Apply topically 3 (three) times a day   Patient not taking: Reported on 2022    naproxen (NAPROSYN) 500 mg tablet   Yes No   Sig: naproxen 500 mg tablet   Patient not taking: Reported on 2022   nystatin (MYCOSTATIN) cream   Yes No   Sig: Apply 1 application topically 2 (two) times a day   Patient not taking: Reported on 2022    traMADol HCl  MG CP24   No No   Sig: Take 1 capsule (100 mg total) by mouth daily   Patient not taking: Reported on 2022    traZODone (DESYREL) 100 mg tablet   No No   Sig: Take 1 tablet (100 mg total) by mouth daily at bedtime   Patient not taking: Reported on 2022       Facility-Administered Medications Last Administration Doses Remaining   lidocaine (XYLOCAINE) 4 % topical solution 5 mL 2/28/2022  2:57 PM 0          Past Medical History:   Diagnosis Date    Hyperlipidemia     Hypertension     Osteoporosis     Shingles        Past Surgical History:   Procedure Laterality Date    CATARACT EXTRACTION Bilateral     FOOT SURGERY Right     reconstruction    TOTAL HIP ARTHROPLASTY Bilateral        Family History   Family history unknown: Yes     I have reviewed and agree with the history as documented  E-Cigarette/Vaping    E-Cigarette Use Never User      E-Cigarette/Vaping Substances    Nicotine No     THC No     CBD No     Flavoring No     Other No     Unknown No      Social History     Tobacco Use    Smoking status: Former Smoker    Smokeless tobacco: Never Used    Tobacco comment: quit 50 years ago   Vaping Use    Vaping Use: Never used   Substance Use Topics    Alcohol use: Yes     Alcohol/week: 7 0 standard drinks     Types: 7 Glasses of wine per week    Drug use: Never       Review of Systems   Constitutional: Negative for chills and fever  HENT: Negative for ear pain and sore throat  Eyes: Negative for pain and visual disturbance  Respiratory: Negative for cough and shortness of breath  Cardiovascular: Negative for chest pain and palpitations  Gastrointestinal: Negative for abdominal pain and vomiting  Genitourinary: Negative for dysuria and hematuria  Musculoskeletal: Negative for arthralgias and back pain  Skin: Positive for rash  Negative for color change  Neurological: Negative for seizures and syncope  All other systems reviewed and are negative  Physical Exam  Physical Exam  Vitals and nursing note reviewed  Constitutional:       General: She is not in acute distress  Appearance: She is well-developed  HENT:      Head: Normocephalic and atraumatic     Eyes:      Conjunctiva/sclera: Conjunctivae normal    Cardiovascular:      Rate and Rhythm: Normal rate and regular rhythm  Heart sounds: No murmur heard  Pulmonary:      Effort: Pulmonary effort is normal  No respiratory distress  Breath sounds: Normal breath sounds  Abdominal:      Palpations: Abdomen is soft  Tenderness: There is no abdominal tenderness  Musculoskeletal:      Cervical back: Neck supple  Skin:     General: Skin is warm and dry  Neurological:      Mental Status: She is alert  Vital Signs  ED Triage Vitals [02/28/22 1629]   Temperature Pulse Respirations Blood Pressure SpO2   97 6 °F (36 4 °C) 88 18 141/69 99 %      Temp Source Heart Rate Source Patient Position - Orthostatic VS BP Location FiO2 (%)   Oral Monitor Sitting Left arm --      Pain Score       --           Vitals:    02/28/22 1629   BP: 141/69   Pulse: 88   Patient Position - Orthostatic VS: Sitting         Visual Acuity      ED Medications  Medications   vancomycin (VANCOCIN) 1000 mg in sodium chloride 0 9% 250 mL IVPB (has no administration in time range)   ceftriaxone (ROCEPHIN) 1 g/50 mL in dextrose IVPB (1,000 mg Intravenous New Bag 2/28/22 1843)       Diagnostic Studies  Results Reviewed     Procedure Component Value Units Date/Time    Lactic acid [[id number]]  (Normal) Collected: 02/28/22 1800    Lab Status: Final result Specimen: Blood from Arm, Right Updated: 02/28/22 1830     LACTIC ACID 1 2 mmol/L     Narrative:      Result may be elevated if tourniquet was used during collection      Comprehensive metabolic panel [[id number]]  (Abnormal) Collected: 02/28/22 1800    Lab Status: Final result Specimen: Blood from Arm, Right Updated: 02/28/22 1827     Sodium 139 mmol/L      Potassium 4 0 mmol/L      Chloride 106 mmol/L      CO2 23 mmol/L      ANION GAP 10 mmol/L      BUN 14 mg/dL      Creatinine 0 59 mg/dL      Glucose 72 mg/dL      Calcium 7 7 mg/dL      Corrected Calcium 8 8 mg/dL      AST 30 U/L      ALT 15 U/L      Alkaline Phosphatase 113 U/L      Total Protein 7 9 g/dL      Albumin 2 6 g/dL      Total Bilirubin 0 50 mg/dL      eGFR 88 ml/min/1 73sq m     Narrative:      National Kidney Disease Foundation guidelines for Chronic Kidney Disease (CKD):     Stage 1 with normal or high GFR (GFR > 90 mL/min/1 73 square meters)    Stage 2 Mild CKD (GFR = 60-89 mL/min/1 73 square meters)    Stage 3A Moderate CKD (GFR = 45-59 mL/min/1 73 square meters)    Stage 3B Moderate CKD (GFR = 30-44 mL/min/1 73 square meters)    Stage 4 Severe CKD (GFR = 15-29 mL/min/1 73 square meters)    Stage 5 End Stage CKD (GFR <15 mL/min/1 73 square meters)  Note: GFR calculation is accurate only with a steady state creatinine    CBC and differential [[id number]]  (Abnormal) Collected: 02/28/22 1800    Lab Status: Final result Specimen: Blood from Arm, Right Updated: 02/28/22 1807     WBC 13 03 Thousand/uL      RBC 5 67 Million/uL      Hemoglobin 11 1 g/dL      Hematocrit 40 9 %      MCV 72 fL      MCH 19 6 pg      MCHC 27 1 g/dL      RDW 17 8 %      MPV 10 3 fL      Platelets 529 Thousands/uL      nRBC 0 /100 WBCs      Neutrophils Relative 70 %      Immat GRANS % 2 %      Lymphocytes Relative 9 %      Monocytes Relative 16 %      Eosinophils Relative 3 %      Basophils Relative 0 %      Neutrophils Absolute 9 27 Thousands/µL      Immature Grans Absolute 0 19 Thousand/uL      Lymphocytes Absolute 1 11 Thousands/µL      Monocytes Absolute 2 04 Thousand/µL      Eosinophils Absolute 0 37 Thousand/µL      Basophils Absolute 0 05 Thousands/µL     Blood culture #1 [[id number]] Collected: 02/28/22 1800    Lab Status: In process Specimen: Blood from Arm, Left Updated: 02/28/22 1805    Blood culture #2 [[id number]] Collected: 02/28/22 1800    Lab Status:  In process Specimen: Blood from Arm, Right Updated: 02/28/22 1804                 XR foot 3+ views RIGHT    (Results Pending)              Procedures  Procedures         ED Course                                             MDM  Number of Diagnoses or Management Options  Cellulitis of both lower extremities: new and requires workup  Diagnosis management comments: ddx includes but is not limited to cellulitis, abscess, venous stasis dermatitis, ulcer, osteo, doubt NSTI  Plan: labs  Abx  xr right foot  Amount and/or Complexity of Data Reviewed  Clinical lab tests: ordered and reviewed  Tests in the radiology section of CPT®: ordered and reviewed  Independent visualization of images, tracings, or specimens: yes    Risk of Complications, Morbidity, and/or Mortality  Presenting problems: moderate  Management options: low  General comments: 66 yo here for wound check  Suspect cellulitis  Leukocytosis consistent with cellulitis  Admit for IV abx  Pt in agreement  Stable at time of admission  Patient Progress  Patient progress: stable      Disposition  Final diagnoses:   Cellulitis of both lower extremities     Time reflects when diagnosis was documented in both MDM as applicable and the Disposition within this note     Time User Action Codes Description Comment    2/28/2022  7:19 PM Huma Lani Add [I28 407,  L03 116] Cellulitis of both lower extremities       ED Disposition     ED Disposition Condition Date/Time Comment    Admit Stable Mon Feb 28, 2022  7:19 PM Case was discussed with Jammie Rodriguez and the patient's admission status was agreed to be Admission Status: inpatient status to the service of Dr Reyes January   Follow-up Information    None         Patient's Medications   Discharge Prescriptions    No medications on file       No discharge procedures on file      PDMP Review     None          ED Provider  Electronically Signed by           Mary Anne Etienne PA-C  02/28/22 Tatiana Trotter

## 2022-02-28 NOTE — PROGRESS NOTES
Patient ID: Sayra Rdz is a 67 y o  female Date of Birth 1945       Chief Complaint   Patient presents with    New Patient Visit     bilateral leg wounds       Allergies:  Aspirin and Warfarin    Diagnosis:      Diagnosis ICD-10-CM Associated Orders   1  Bilateral lower leg cellulitis  L03 116     L03 115    2  Peripheral vascular disease of extremity (HCC)  I73 9 lidocaine (XYLOCAINE) 4 % topical solution 5 mL     Wound cleansing and dressings   3  Severe malnutrition ([address] )  E43    4  Open wound of left lower leg, initial encounter  S81 802A    5  Open wound of right lower leg, initial encounter  S81 801A        Assessment and Plan :  · Initial Evaluation BLE open wounds  Pt with signs of infection such as pain, foul smelling brown drainage and necrotic noted on exam   Findings  And assessment discussed with patient today and advised patient to go to hospital for proper care, possible excisional debridement and IV antibiotics  Pt understands and states she will go today  · Surgically Debrided as below  · Recommend hospital evaluation and possible admission  · No harsh cleansers such as alcohol, peroxide, or antibacterial soap, do not submerge in water  · Followup upon discharge from hospital      Subjective:   67 y o  female with pmhx HLD, IFG, Osteoporosis, PVD who presents for initial eval of BLE open wounds which has been present for over 2 years  Pt has had multiple admissions (June '21,  Sept '21, Nov '21 and Dec '21) for BLE wound infections with Group B Strep, MRSA and Proteus Morganella  Pt has been treated with IV Vanco, doxycycline and Bactrim in the past  Pt states she has been followed by [facility] - wound care and has been applying Hydrofera blue to wound bed  Pt admits to soaking her legs in water for pain relief  Pt states she is draining and they are malodorous  Pt admits pain with touch  Pt denies any fevers chills or sob             The following portions of the patient's history were reviewed and updated as appropriate:   Patient Active Problem List   Diagnosis    Hammer toe    Combined hyperlipidemia    Essential (primary) hypertension    Foot pain    IFG (impaired fasting glucose)    Senile osteoporosis    Encounter to establish care    Open wound of left lower leg    Open wound of right lower leg    Cellulitis of left foot    Microcytic anemia    Acute blood loss anemia    Severe malnutrition (HCC)    Peripheral vascular disease of extremity (HCC)    Need for transfer to another facility    Continuous opioid dependence ([facility] [address] )    Wound cellulitis     Past Medical History:   Diagnosis Date    Hyperlipidemia     Hypertension     Osteoporosis     Shingles      Past Surgical History:   Procedure Laterality Date    CATARACT EXTRACTION Bilateral     FOOT SURGERY Right     reconstruction    TOTAL HIP ARTHROPLASTY Bilateral      Family History   Family history unknown: Yes      Social History     Socioeconomic History    Marital status:      Spouse name: None    Number of children: None    Years of education: None    Highest education level: None   Occupational History    None   Tobacco Use    Smoking status: Former Smoker    Smokeless tobacco: Never Used    Tobacco comment: quit 50 years ago   Vaping Use    Vaping Use: Never used   Substance and Sexual Activity    Alcohol use: Yes     Alcohol/week: 7 0 standard drinks     Types: 7 Glasses of wine per week    Drug use: Never    Sexual activity: None   Other Topics Concern    None   Social History Narrative    None     Social Determinants of Health     Financial Resource Strain: Not on file   Food Insecurity: No Food Insecurity    Worried About Running Out of Food in the Last Year: Never true    Marcio of Food in the Last Year: Never true   Transportation Needs: No Transportation Needs    Lack of Transportation (Medical): No    Lack of Transportation (Non-Medical):  No   Physical Activity: Not on file Stress: Not on file   Social Connections: Not on file   Intimate Partner Violence: Not on file   Housing Stability: Low Risk     Unable to Pay for Housing in the Last Year: No    Number of Places Lived in the Last Year: 1    Unstable Housing in the Last Year: No        Current Outpatient Medications:     Calcium Carb-Cholecalciferol (CALCIUM 600+D3) 600-200 MG-UNIT TABS, Calcium 600 + D(3) 600 mg (1,500 mg)-200 unit tablet, Disp: , Rfl:     calcium carbonate-vitamin D (OSCAL-D) 500 mg-200 units per tablet, Take 1 tablet by mouth daily, Disp: , Rfl:     Cyanocobalamin (B-12) 1000 MCG CAPS, , Disp: , Rfl:     cyanocobalamin (VITAMIN B-12) 100 MCG tablet, 1,000 mcg, Disp: , Rfl:     gabapentin (NEURONTIN) 300 mg capsule, Take 2 capsules (600 mg total) by mouth daily at bedtime, Disp: 90 capsule, Rfl: 1    acetaminophen, FOR EMS ONLY, (TYLENOL) 160 mg/5 mL suspension, , Disp: , Rfl:     atenolol (TENORMIN) 25 mg tablet, 25 mg (Patient not taking: Reported on 2/28/2022 ), Disp: , Rfl:     atorvastatin (LIPITOR) 20 mg tablet, 20 mg (Patient not taking: Reported on 2/28/2022 ), Disp: , Rfl:     baclofen 10 mg tablet, baclofen 10 mg tablet (Patient not taking: Reported on 2/28/2022), Disp: , Rfl:     collagenase (SANTYL) ointment, Apply topically daily (Patient not taking: Reported on 2/28/2022 ), Disp: 15 g, Rfl: 0    denosumab (PROLIA) 60 mg/mL, Prolia 60 mg/mL subcutaneous syringe (Patient not taking: Reported on 2/28/2022), Disp: , Rfl:     enalapril (VASOTEC) 20 mg tablet, 20 mg (Patient not taking: Reported on 2/28/2022 ), Disp: , Rfl:     ergocalciferol (VITAMIN D2) 50,000 units, Take 1 capsule (50,000 Units total) by mouth 2 (two) times a week for 16 doses, Disp: 16 capsule, Rfl: 0    gentamicin (GARAMYCIN) 0 1 % ointment, Apply topically 2 (two) times a day (Patient not taking: Reported on 2/28/2022 ), Disp: , Rfl:     hydrochlorothiazide (MICROZIDE) 12 5 mg capsule, hydrochlorothiazide 12 5 mg capsule (Patient not taking: Reported on 2/28/2022), Disp: , Rfl:     Incontinence Supply Disposable (BRIEFS OVERNIGHT MEDIUM) MISC, by Does not apply route 3 (three) times a day, Disp: 30 each, Rfl: 3    LORazepam (ATIVAN) 0 5 mg tablet, lorazepam 0 5 mg tablet (Patient not taking: Reported on 2/28/2022), Disp: , Rfl:     MAGNESIUM PO, 2 tablets (Patient not taking: Reported on 2/28/2022 ), Disp: , Rfl:     mupirocin (BACTROBAN) 2 % ointment, Apply topically 3 (three) times a day (Patient not taking: Reported on 2/28/2022 ), Disp: 60 g, Rfl: 1    naproxen (NAPROSYN) 500 mg tablet, naproxen 500 mg tablet (Patient not taking: Reported on 2/28/2022), Disp: , Rfl:     nystatin (MYCOSTATIN) cream, Apply 1 application topically 2 (two) times a day (Patient not taking: Reported on 2/28/2022 ), Disp: , Rfl:     Omega-3 Fatty Acids ([employer] FISH OIL) 1200 MG CAPS, Take 1 capsule by mouth Daily (Patient not taking: Reported on 2/28/2022 ), Disp: , Rfl:     traMADol HCl  MG CP24, Take 1 capsule (100 mg total) by mouth daily (Patient not taking: Reported on 2/28/2022 ), Disp: 30 capsule, Rfl: 0    traZODone (DESYREL) 100 mg tablet, Take 1 tablet (100 mg total) by mouth daily at bedtime (Patient not taking: Reported on 2/28/2022 ), Disp: 90 tablet, Rfl: 1    Wound Dressings (Adaptic Non-Adhering Dressing) PADS, Apply 30 each topically 2 (two) times a day, Disp: 30 each, Rfl: 0  No current facility-administered medications for this visit  Review of Systems    Objective: There were no vitals taken for this visit  Physical Exam                Wound 02/28/22 Leg Right;Medial (Active)   Wound Description Beefy red;Pink;Yellow 03/01/22 0836   Jessica-wound Assessment Dry; Intact; Erythema;Pink 03/01/22 0836   Wound Length (cm) 11 6 cm 03/01/22 0836   Wound Width (cm) 7 5 cm 03/01/22 0836   Wound Depth (cm) 0 2 cm 03/01/22 0836   Wound Surface Area (cm^2) 87 cm^2 03/01/22 0836   Wound Volume (cm^3) 17 4 cm^3 03/01/22 5675   Calculated Wound Volume (cm^3) 17 4 cm^3 03/01/22 0836   Change in Wound Size % -404 35 03/01/22 0836   Tunneling 0 cm 03/01/22 0836   Undermining 0 03/01/22 0836   Wound Site Closure Open to air 02/28/22 2130   Drainage Amount Moderate 03/01/22 0836   Drainage Description Dooley;Serosanguineous 03/01/22 0836   Non-staged Wound Description Full thickness 03/01/22 0836   Treatments Cleansed;Irrigation with NSS;Site care 03/01/22 0836   Dressing Calcium Alginate with Silver;ABD;Dry dressing 03/01/22 0836   Wound packed? No 03/01/22 0836   Dressing Changed Changed 03/01/22 0836   Patient Tolerance Tolerated well 03/01/22 0836   Dressing Status Clean;Dry; Intact 03/01/22 0836       Wound 02/28/22 Leg Left;Medial (Active)   Wound Description Beefy red;Pink;Yellow 03/01/22 0837   Jessica-wound Assessment Dry; Intact; Erythema;Pink;Scaly 03/01/22 0837   Wound Length (cm) 14 cm 03/01/22 0837   Wound Width (cm) 9 2 cm 03/01/22 0837   Wound Depth (cm) 0 3 cm 03/01/22 0837   Wound Surface Area (cm^2) 128 8 cm^2 03/01/22 0837   Wound Volume (cm^3) 38 64 cm^3 03/01/22 0837   Calculated Wound Volume (cm^3) 38 64 cm^3 03/01/22 0837   Change in Wound Size % -150 91 03/01/22 0837   Tunneling 0 cm 03/01/22 0837   Undermining 0 03/01/22 0837   Wound Site Closure Open to air 02/28/22 2130   Drainage Amount Moderate 03/01/22 0837   Drainage Description Serosanguineous; Tan 03/01/22 0837   Non-staged Wound Description Full thickness 03/01/22 0837   Treatments Cleansed;Irrigation with NSS;Site care 03/01/22 0837   Dressing Calcium Alginate with Silver;ABD;Dry dressing 03/01/22 0837   Wound packed? No 03/01/22 0837   Dressing Changed Changed 03/01/22 0837   Patient Tolerance Tolerated well 03/01/22 0837   Dressing Status Clean;Dry; Intact 03/01/22 0837       Debridement   Wound 02/28/22 Leg Right;Medial    Universal Protocol:  Consent: Verbal consent obtained  Written consent obtained    Risks and benefits: risks, benefits and alternatives were discussed  Consent given by: patient  Time out: Immediately prior to procedure a "time out" was called to verify the correct patient, procedure, equipment, support staff and site/side marked as required  Patient understanding: patient states understanding of the procedure being performed  Patient identity confirmed: verbally with patient      Performed by: PA  Debridement type: surgical  Level of debridement: subcutaneous tissue  Pain control: lidocaine 4%  Post-debridement measurements  Length (cm): 4 6  Width (cm): 7 5  Depth (cm): 0 3  Percent debrided: 100%  Surface Area (cm^2): 34 5  Area debrided (cm^2): 34 5  Volume (cm^3): 10 35  Tissue and other material debrided: adipose and subcutaneous tissue  Devitalized tissue debrided: biofilm, exudate, fibrin, necrotic debris and slough  Instrument(s) utilized: curette and blade  Bleeding: small  Hemostasis obtained with: pressure  Procedural pain (0-10): 0  Post-procedural pain: 10   Response to treatment: procedure was not tolerated well  Debridement Comments: Pt with severe pain  Debridement   Wound 02/28/22 Leg Left;Medial    Universal Protocol:  Consent: Verbal consent obtained  Written consent obtained  Risks and benefits: risks, benefits and alternatives were discussed  Consent given by: patient  Time out: Immediately prior to procedure a "time out" was called to verify the correct patient, procedure, equipment, support staff and site/side marked as required    Patient understanding: patient states understanding of the procedure being performed  Patient identity confirmed: verbally with patient      Performed by: PA  Debridement type: surgical  Level of debridement: subcutaneous tissue  Pain control: lidocaine 4%  Post-debridement measurements  Length (cm): 14  Width (cm): 11  Depth (cm): 0 3  Percent debrided: 100%  Surface Area (cm^2): 154  Area debrided (cm^2): 154  Volume (cm^3): 46 2  Tissue and other material debrided: adipose and subcutaneous tissue  Devitalized tissue debrided: biofilm, exudate, fibrin, necrotic debris and slough  Instrument(s) utilized: curette and blade  Bleeding: small  Hemostasis obtained with: pressure  Procedural pain (0-10): 0  Post-procedural pain: 10   Response to treatment: procedure was not tolerated well  Debridement Comments: Pt with severe pain                     Results from last 6 Months   Lab Units 11/03/21  1509   WOUND CULTURE  2+ Growth of Beta Hemolytic Streptococcus Group B*  2+ Growth of Beta Hemolytic Streptococcus Group G*  2+ Growth of        Wound Instructions:  Orders Placed This Encounter   Procedures    Wound cleansing and dressings     Left and Right Medial leg Wounds    Cleansed with Normal saline  Apply ABD pads to the wound  Cover with nathalie   Secure with tape    This was done today and patient sent to ER for further evaluation     Standing Status:   Future     Standing Expiration Date:   2/28/2023       Total time spent today:  30 minutes  This includes reviewing the patient's chart, pertinent physician records Dr Meghan Eubanks, General Medicine (6/7/21), Dr Hammad Hong (8/5/21, 9/27/21), Dr Lexx Thompson, Hospitalist (11/3/21) Dr Angie Manuel, General Medicine (12/29/21)  Wound cultures of 11/3/21 and 6/9/21  Carmela Farrar PA-C, [facility]    Portions of the record may have been created with voice recognition software  Occasional wrong word or "sound alike" substitutions may have occurred due to the inherent limitations of voice recognition software  Read the chart carefully and recognize, using context, where substitutions have occurred

## 2022-02-28 NOTE — PATIENT INSTRUCTIONS
Orders Placed This Encounter   Procedures    Wound cleansing and dressings     Left and Right Medial leg Wounds    Cleansed with Normal saline  Apply ABD pads to the wound    Cover with natahlie   Secure with tape    This was done today and patient sent to ER for further evaluation     Standing Status:   Future     Standing Expiration Date:   2/28/2023

## 2022-03-01 ENCOUNTER — APPOINTMENT (INPATIENT)
Dept: VASCULAR ULTRASOUND | Facility: HOSPITAL | Age: 77
DRG: 603 | End: 2022-03-01
Payer: COMMERCIAL

## 2022-03-01 LAB
ANION GAP SERPL CALCULATED.3IONS-SCNC: 5 MMOL/L (ref 4–13)
BUN SERPL-MCNC: 14 MG/DL (ref 5–25)
CALCIUM SERPL-MCNC: 8.1 MG/DL (ref 8.3–10.1)
CHLORIDE SERPL-SCNC: 107 MMOL/L (ref 100–108)
CO2 SERPL-SCNC: 27 MMOL/L (ref 21–32)
CREAT SERPL-MCNC: 0.66 MG/DL (ref 0.6–1.3)
ERYTHROCYTE [DISTWIDTH] IN BLOOD BY AUTOMATED COUNT: 16.8 % (ref 11.6–15.1)
GFR SERPL CREATININE-BSD FRML MDRD: 85 ML/MIN/1.73SQ M
GLUCOSE SERPL-MCNC: 94 MG/DL (ref 65–140)
HCT VFR BLD AUTO: 34.3 % (ref 34.8–46.1)
HGB BLD-MCNC: 9.2 G/DL (ref 11.5–15.4)
MCH RBC QN AUTO: 20.3 PG (ref 26.8–34.3)
MCHC RBC AUTO-ENTMCNC: 26.8 G/DL (ref 31.4–37.4)
MCV RBC AUTO: 76 FL (ref 82–98)
PLATELET # BLD AUTO: 654 THOUSANDS/UL (ref 149–390)
PMV BLD AUTO: 10.8 FL (ref 8.9–12.7)
POTASSIUM SERPL-SCNC: 4.2 MMOL/L (ref 3.5–5.3)
PROCALCITONIN SERPL-MCNC: 0.16 NG/ML
RBC # BLD AUTO: 4.54 MILLION/UL (ref 3.81–5.12)
SODIUM SERPL-SCNC: 139 MMOL/L (ref 136–145)
WBC # BLD AUTO: 11.03 THOUSAND/UL (ref 4.31–10.16)

## 2022-03-01 PROCEDURE — 99233 SBSQ HOSP IP/OBS HIGH 50: CPT | Performed by: INTERNAL MEDICINE

## 2022-03-01 PROCEDURE — 87147 CULTURE TYPE IMMUNOLOGIC: CPT

## 2022-03-01 PROCEDURE — 80048 BASIC METABOLIC PNL TOTAL CA: CPT

## 2022-03-01 PROCEDURE — 84145 PROCALCITONIN (PCT): CPT

## 2022-03-01 PROCEDURE — 87205 SMEAR GRAM STAIN: CPT

## 2022-03-01 PROCEDURE — 87186 SC STD MICRODIL/AGAR DIL: CPT

## 2022-03-01 PROCEDURE — 85027 COMPLETE CBC AUTOMATED: CPT

## 2022-03-01 PROCEDURE — 87077 CULTURE AEROBIC IDENTIFY: CPT

## 2022-03-01 PROCEDURE — 93970 EXTREMITY STUDY: CPT | Performed by: SURGERY

## 2022-03-01 PROCEDURE — 93970 EXTREMITY STUDY: CPT

## 2022-03-01 PROCEDURE — 87070 CULTURE OTHR SPECIMN AEROBIC: CPT

## 2022-03-01 PROCEDURE — 87081 CULTURE SCREEN ONLY: CPT | Performed by: STUDENT IN AN ORGANIZED HEALTH CARE EDUCATION/TRAINING PROGRAM

## 2022-03-01 RX ADMIN — CALCIUM 1 TABLET: 500 TABLET ORAL at 08:39

## 2022-03-01 RX ADMIN — VANCOMYCIN HYDROCHLORIDE 750 MG: 750 INJECTION, SOLUTION INTRAVENOUS at 19:57

## 2022-03-01 RX ADMIN — CYANOCOBALAMIN TAB 500 MCG 1000 MCG: 500 TAB at 08:39

## 2022-03-01 RX ADMIN — CEFTRIAXONE SODIUM 1000 MG: 10 INJECTION, POWDER, FOR SOLUTION INTRAVENOUS at 17:09

## 2022-03-01 RX ADMIN — GABAPENTIN 600 MG: 300 CAPSULE ORAL at 22:45

## 2022-03-01 RX ADMIN — ENOXAPARIN SODIUM 40 MG: 40 INJECTION SUBCUTANEOUS at 08:39

## 2022-03-01 RX ADMIN — VANCOMYCIN HYDROCHLORIDE 750 MG: 750 INJECTION, SOLUTION INTRAVENOUS at 08:39

## 2022-03-01 NOTE — ASSESSMENT & PLAN NOTE
· Platelets 332  · Patient appears to have chronic elevation with possible acute increase reactive in the setting of acute infection  · Trend CBC

## 2022-03-01 NOTE — ASSESSMENT & PLAN NOTE
Patient with history of chronic bilateral lower extremity wounds, with history of MRSA infection, was seen by outpatient wound care today who had concerns for bilateral lower extremity cellulitis  Patient reports having chronic large ulcers on each lower extremity, and reports increasing redness in both over the past 24 hours; with increased drainage and pain from the left wound  Denies fever, chill, recent sick contact, headache, visual disturbance, dizzy/lightheadedness, chest pain, palpitations, SOB, ABD pain, N/V/D, new numbness/tingling/weakness, or other symptom      /69 (BP Location: Left arm)   Pulse 88   Temp 97 6 °F (36 4 °C) (Oral)   Resp 18   SpO2 99%     · Has history of recurrent BL LE cellulitis w/MRSA infection (last d/c on 11/2021)  · Patient reports increasing redness in BL LE's over past 24h with increased drainage and pain in the left wound  · Seen by outpatient wound care today who had concerns of infection and recommended ED visit  · BL lower extremity large, wet, medial ulcers with surrounding erythema and warmth, without underlying fluctuance felt  · Afebrile;WBC 13 03; ANC 9 27  · XR R-foot performed in ED awaiting official read   · ED gave rocephin + vanco   · Procal and wound cultures ordered   · Continue rocephin + vanco overnight, trend WBC and f/u with pending infectious labs, localized wound care  · Wound care nursing consulted, consider ID consult

## 2022-03-01 NOTE — WOUND OSTOMY CARE
Progress Note - Wound   Juan Cabrera 67 y o  female MRN: [id number]  Unit/Bed#: -01 Encounter: [id number]      Assessment:   Patient admitted due to wound cellulitis  History of HLD, HTN, osteoporosis, shingles  Wound care consulted for bilateral lower extremity wounds  Patient agreeable to assessment, alert and oriented x 4, continent of bowel and bladder, independently turns for assessment, heels elevated off of mattress, is an assist with care  Primary RN at bedside for assessment  1  Right distal, medial leg- Chronic wound suspect etiology due to venous disease  Wound is oval in shape, moist, full-thickness, approx  20% yellow tissue and 80% beefy red and pink tissue, with moderate amount of tan and seorsanguineuos drainage noted, no foul odor  Jessica-wound is dry, intact, scaly, warm to tough, and erythematous  Patient is being treated for cellulitis with IV antibiotics  2  Left distal, medial leg- Chronic wound suspect etiology due to venous disease  Wound is oval in shape, moist, full-thickness, approx  40% yellow tissue and 60% beefy red and pink tissue, with moderate amount of tan and seorsanguineuos drainage noted, no foul odor  Jessica-wound is dry, intact, scaly, warm to tough, and erythematous  Patient is being treated for cellulitis with IV antibiotics  3  Bilateral sacrum, hips, elbows, and heels are dry, intact, blanchable pink skin, and blanchable red skin  Educated patient on importance of frequent offloading of pressure via turning, repositioning, and weight-shifting  Verbalized understanding of plan of care  No induration, fluctuance, odor, or purulence noted to the above noted wounds  New dressings applied  Patient tolerated well, reports mild pain to the wound with cleansing  Primary nurse aware of plan of care  See flow sheets for more detailed assessment findings  Will follow along      Skin care Plan:  1-Protect sacrum w/Allevyn foam, severino with P, change q3d and PRN, peel back and check skin q-shift  2-Turn/reposition q2h for pressure re-distribution on skin   3-Elevate heels to offload pressure  4-Moisturize skin daily with skin nourishing cream  5-Ehob cushion in chair when out of bed  6-Hydraguard to bilateral heels BID and PRN   7-B/L leg- Cleanse with NSS, pat dry  Apply Maxorb Alginate with silver over wound beds, cover with ABD pad, wrap with Dayday  Change daily and PRN for soilage/dislodgment  8-Wound care will follow along with patient weekly, please call or tiger text with questions and concerns       Wound 02/28/22 Leg Right;Medial (Active)   Wound Image   03/01/22 0836   Wound Description Beefy red;Pink;Yellow 03/01/22 0836   Jessica-wound Assessment Dry; Intact; Erythema;Pink 03/01/22 0836   Wound Length (cm) 11 6 cm 03/01/22 0836   Wound Width (cm) 7 5 cm 03/01/22 0836   Wound Depth (cm) 0 2 cm 03/01/22 0836   Wound Surface Area (cm^2) 87 cm^2 03/01/22 0836   Wound Volume (cm^3) 17 4 cm^3 03/01/22 0836   Calculated Wound Volume (cm^3) 17 4 cm^3 03/01/22 0836   Change in Wound Size % -404 35 03/01/22 0836   Tunneling 0 cm 03/01/22 0836   Undermining 0 03/01/22 0836   Wound Site Closure     Drainage Amount Moderate 03/01/22 0836   Drainage Description Dooley;Serosanguineous 03/01/22 0836   Non-staged Wound Description Full thickness 03/01/22 0836   Treatments Cleansed;Irrigation with NSS;Site care 03/01/22 0836   Dressing Calcium Alginate with Silver;ABD;Dry dressing 03/01/22 0836   Wound packed? No 03/01/22 0836   Dressing Changed Changed 03/01/22 0836   Patient Tolerance Tolerated well 03/01/22 0836   Dressing Status Clean;Dry; Intact 03/01/22 0836       Wound 02/28/22 Leg Left;Medial (Active)   Wound Image   03/01/22 0837   Wound Description Beefy red;Pink;Yellow 03/01/22 0837   Jessica-wound Assessment Dry; Intact; Erythema;Pink;Scaly 03/01/22 0837   Wound Length (cm) 14 cm 03/01/22 0837   Wound Width (cm) 9 2 cm 03/01/22 0837   Wound Depth (cm) 0 3 cm 03/01/22 0837   Wound Surface Area (cm^2) 128 8 cm^2 03/01/22 0837   Wound Volume (cm^3) 38 64 cm^3 03/01/22 0837   Calculated Wound Volume (cm^3) 38 64 cm^3 03/01/22 0837   Change in Wound Size % -150 91 03/01/22 0837   Tunneling 0 cm 03/01/22 0837   Undermining 0 03/01/22 0837   Wound Site Closure     Drainage Amount Moderate 03/01/22 0837   Drainage Description Serosanguineous; Tan 03/01/22 0837   Non-staged Wound Description Full thickness 03/01/22 0837   Treatments Cleansed;Irrigation with NSS;Site care 03/01/22 0837   Dressing Calcium Alginate with Silver;ABD;Dry dressing 03/01/22 0837   Wound packed? No 03/01/22 0837   Dressing Changed Changed 03/01/22 0837   Patient Tolerance Tolerated well 03/01/22 0837   Dressing Status Clean;Dry; Intact 03/01/22 0837     Call or tigertext with any questions  Wound Care will continue to follow    Jammie Dunham RN BSN  Wound care

## 2022-03-01 NOTE — PROGRESS NOTES
[facility]  Progress Note - Robbi Hui 1945, 67 y o  female MRN: [id number]  Unit/Bed#: -Alejandro Encounter: [id number]  Primary Care Provider: TELLY Ruiz   Date and time admitted to hospital: 2/28/2022  5:10 PM    * Wound cellulitis  Assessment & Plan  Patient with history of chronic bilateral lower extremity wounds, with history of MRSA infection, was seen by outpatient wound care today who had concerns for bilateral lower extremity cellulitis  Patient reports having chronic large ulcers on each lower extremity, and reports increasing redness in both over the past 24 hours; with increased drainage and pain from the left wound  Denies fever, chill, recent sick contact, headache, visual disturbance, dizzy/lightheadedness, chest pain, palpitations, SOB, ABD pain, N/V/D, new numbness/tingling/weakness, or other symptom  /59   Pulse 93   Temp 98 8 °F (37 1 °C)   Resp 18   Ht 5' 2" (1 575 m)   Wt 58 1 kg (128 lb 1 4 oz)   SpO2 93%   BMI 23 43 kg/m²     · Has history of recurrent BL LE cellulitis w/MRSA infection (last d/c on 11/2021)  · Patient reports increasing redness in BL LE's over past 24h with increased drainage and pain in the left wound  · Seen by outpatient wound care today who had concerns of infection and recommended ED visit  · BL lower extremity large, wet, medial ulcers with surrounding erythema and warmth, without underlying fluctuance felt  · Afebrile;WBC 13 03; ANC 9 27  · XR R-foot performed in ED awaiting official read   · ED gave rocephin + vanco   · Procal and wound cultures ordered   · Continue rocephin + vanco overnight, trend WBC and f/u with pending infectious labs, localized wound care  · Wound care nursing consulted, consider ID consult  03/01/2022-patient was seen at bedside today  Wound cultures are pending  WBC count is downtrending  MRSA culture is pending  Podiatry has been consulted  Will obtain venous duplex    Arterial duplex scan reviewed from Care everywhere which seem to be unremarkable  Continue antibiotics with ceftriaxone and vancomycin for now  Patient's vital signs are stable  Thrombocytosis  Assessment & Plan  · Platelets 457  · Patient appears to have chronic elevation with possible acute increase reactive in the setting of acute infection  · Trend CBC    Essential (primary) hypertension  Assessment & Plan  · /69 HR88  · Patient reports not taking any home antihypertensive medications (reports only taking home gabapentin and vitamin supplements currently)  · Monitor BP per unit protocol    Combined hyperlipidemia  Assessment & Plan  · Currently denies taking any statin medications      VTE Pharmacologic Prophylaxis:   VTE Score: 4 Moderate Risk (Score 3-4) - Pharmacological DVT Prophylaxis Ordered: Enoxaparin (Lovenox)  Mechanical VTE Prophylaxis in Place: Yes    Patient Centered Rounds: I have performed bedside rounds with nursing staff today  Discussions with Specialists or Other Care Team Provider: y        Current Length of Stay: 1 day(s)    Current Patient Status: Inpatient         Code Status: Level 1 - Full Code      Subjective:   Patient was seen at bedside today morning  She appears in no acute apparent distress  Alert oriented  No discomfort  No events reported by nursing staff    Objective:     Vitals:   Temp (24hrs), Av 2 °F (36 8 °C), Min:97 5 °F (36 4 °C), Max:98 8 °F (37 1 °C)    Temp:  [97 5 °F (36 4 °C)-98 8 °F (37 1 °C)] 98 8 °F (37 1 °C)  HR:  [86-93] 93  Resp:  [17-18] 18  BP: (100-141)/(59-86) 100/59  SpO2:  [93 %-99 %] 93 %  Body mass index is 23 43 kg/m²  Input and Output Summary (last 24 hours): Intake/Output Summary (Last 24 hours) at 3/1/2022 1237  Last data filed at 3/1/2022 0837  Gross per 24 hour   Intake 548 33 ml   Output 500 ml   Net 48 33 ml       Physical Exam:     Physical Exam  Vitals and nursing note reviewed     Constitutional:       General: She is not in acute distress  Appearance: She is well-developed  HENT:      Head: Normocephalic and atraumatic  Eyes:      Conjunctiva/sclera: Conjunctivae normal    Cardiovascular:      Rate and Rhythm: Normal rate and regular rhythm  Heart sounds: No murmur heard  Pulmonary:      Effort: Pulmonary effort is normal  No respiratory distress  Breath sounds: Normal breath sounds  Abdominal:      Palpations: Abdomen is soft  Tenderness: There is no abdominal tenderness  Musculoskeletal:      Cervical back: Neck supple  Comments: Multiple wounds in bilateral lower extremities, the location of the wounds is medial side of left and right lower leg  No gross discharge  Skin:     General: Skin is warm and dry  Neurological:      Mental Status: She is alert  Additional Data:     Labs:  Results from last 7 days   Lab Units 03/01/22 0433 02/28/22  1800 02/28/22  1800   WBC Thousand/uL 11 03*   < > 13 03*   HEMOGLOBIN g/dL 9 2*   < > 11 1*   HEMATOCRIT % 34 3*   < > 40 9   PLATELETS Thousands/uL 654*   < > 786*   NEUTROS PCT %  --   --  70   LYMPHS PCT %  --   --  9*   MONOS PCT %  --   --  16*   EOS PCT %  --   --  3    < > = values in this interval not displayed  Results from last 7 days   Lab Units 03/01/22 0433 02/28/22  1800 02/28/22  1800   SODIUM mmol/L 139   < > 139   POTASSIUM mmol/L 4 2   < > 4 0   CHLORIDE mmol/L 107   < > 106   CO2 mmol/L 27   < > 23   BUN mg/dL 14   < > 14   CREATININE mg/dL 0 66   < > 0 59*   ANION GAP mmol/L 5   < > 10   CALCIUM mg/dL 8 1*   < > 7 7*   ALBUMIN g/dL  --   --  2 6*   TOTAL BILIRUBIN mg/dL  --   --  0 50   ALK PHOS U/L  --   --  113   ALT U/L  --   --  15   AST U/L  --   --  30   GLUCOSE RANDOM mg/dL 94   < > 72    < > = values in this interval not displayed                   Results from last 7 days   Lab Units 03/01/22 0433 02/28/22  2245 02/28/22  1800   LACTIC ACID mmol/L  --   --  1 2   PROCALCITONIN ng/ml 0 16 0 15  -- Imaging: No pertinent imaging reviewed  Recent Cultures (last 7 days):     Results from last 7 days   Lab Units 02/28/22  1800   BLOOD CULTURE  Received in Microbiology Lab  Culture in Progress  Received in Microbiology Lab  Culture in Progress  Lines/Drains:  Invasive Devices  Report    Peripheral Intravenous Line            Peripheral IV 02/28/22 Right Antecubital <1 day                Telemetry:        Last 24 Hours Medication List:   Current Facility-Administered Medications   Medication Dose Route Frequency Provider Last Rate    acetaminophen  650 mg Oral Q6H PRN Prabhakar Mariee PA-C      calcium carbonate  1 tablet Oral Daily With Breakfast Prabhakar Mariee PA-C      cefTRIAXone  1,000 mg Intravenous Q24H [place], [place]      cyanocobalamin  1,000 mcg Oral Daily [place], [place]      enoxaparin  40 mg Subcutaneous Daily [place], [place]      gabapentin  600 mg Oral HS [place], [place]      morphine injection  2 mg Intravenous Q4H PRN [place], [place]      oxyCODONE  2 5 mg Oral Q4H PRN [place], [place]      oxyCODONE  5 mg Oral Q4H PRN [place], [place]      vancomycin  12 5 mg/kg Intravenous Q12H [place], [place] 750 mg (03/01/22 5065)        Today, Patient Was Seen By: Esteban Villarreal MD    ** Please Note: This note has been constructed using a voice recognition system   **

## 2022-03-01 NOTE — ASSESSMENT & PLAN NOTE
· /69 [id number]  · Patient reports not taking any home antihypertensive medications (reports only taking home gabapentin and vitamin supplements currently)  · Monitor BP per unit protocol

## 2022-03-01 NOTE — ASSESSMENT & PLAN NOTE
· /69 [id number]  · Patient reports not taking any home antihypertensive medications (reports only taking home gabapentin and vitamin supplements currently)  · Monitor BP per unit protocol

## 2022-03-01 NOTE — ASSESSMENT & PLAN NOTE
Patient with history of chronic bilateral lower extremity wounds, with history of MRSA infection, was seen by outpatient wound care today who had concerns for bilateral lower extremity cellulitis  Patient reports having chronic large ulcers on each lower extremity, and reports increasing redness in both over the past 24 hours; with increased drainage and pain from the left wound  Denies fever, chill, recent sick contact, headache, visual disturbance, dizzy/lightheadedness, chest pain, palpitations, SOB, ABD pain, N/V/D, new numbness/tingling/weakness, or other symptom  /59   Pulse 93   Temp 98 8 °F (37 1 °C)   Resp 18   Ht 5' 2" (1 575 m)   Wt 58 1 kg (128 lb 1 4 oz)   SpO2 93%   BMI 23 43 kg/m²     · Has history of recurrent BL LE cellulitis w/MRSA infection (last d/c on 11/2021)  · Patient reports increasing redness in BL LE's over past 24h with increased drainage and pain in the left wound  · Seen by outpatient wound care today who had concerns of infection and recommended ED visit  · BL lower extremity large, wet, medial ulcers with surrounding erythema and warmth, without underlying fluctuance felt  · Afebrile;WBC 13 03; ANC 9 27  · XR R-foot performed in ED awaiting official read   · ED gave rocephin + vanco   · Procal and wound cultures ordered   · Continue rocephin + vanco overnight, trend WBC and f/u with pending infectious labs, localized wound care  · Wound care nursing consulted, consider ID consult  03/01/2022-patient was seen at bedside today  Wound cultures are pending  WBC count is downtrending  MRSA culture is pending  Podiatry has been consulted  Will obtain venous duplex  Arterial duplex scan reviewed from Care everywhere which seem to be unremarkable  Continue antibiotics with ceftriaxone and vancomycin for now  Patient's vital signs are stable

## 2022-03-01 NOTE — H&P
[facility]  H&P- Jackson Hence 1945, 67 y o  female MRN: [id number]  Unit/Bed#: -Alejandro Encounter: [id number]  Primary Care Provider: TELLY Whitt   Date and time admitted to hospital: 2/28/2022  5:10 PM    * Wound cellulitis  Assessment & Plan  Patient with history of chronic bilateral lower extremity wounds, with history of MRSA infection, was seen by outpatient wound care today who had concerns for bilateral lower extremity cellulitis  Patient reports having chronic large ulcers on each lower extremity, and reports increasing redness in both over the past 24 hours; with increased drainage and pain from the left wound  Denies fever, chill, recent sick contact, headache, visual disturbance, dizzy/lightheadedness, chest pain, palpitations, SOB, ABD pain, N/V/D, new numbness/tingling/weakness, or other symptom      /69 (BP Location: Left arm)   Pulse 88   Temp 97 6 °F (36 4 °C) (Oral)   Resp 18   SpO2 99%     · Has history of recurrent BL LE cellulitis w/MRSA infection (last d/c on 11/2021)  · Patient reports increasing redness in BL LE's over past 24h with increased drainage and pain in the left wound  · Seen by outpatient wound care today who had concerns of infection and recommended ED visit  · BL lower extremity large, wet, medial ulcers with surrounding erythema and warmth, without underlying fluctuance felt  · Afebrile;WBC 13 03; ANC 9 27  · XR R-foot performed in ED awaiting official read   · ED gave rocephin + vanco   · Procal and wound cultures ordered   · Continue rocephin + vanco overnight, trend WBC and f/u with pending infectious labs, localized wound care  · Wound care nursing consulted, consider ID consult     Essential (primary) hypertension  Assessment & Plan  · /69 II23  · Patient reports not taking any home antihypertensive medications (reports only taking home gabapentin and vitamin supplements currently)  · Monitor BP per unit protocol    Thrombocytosis  Assessment & Plan  · Platelets 917  · Patient appears to have chronic elevation with possible acute increase reactive in the setting of acute infection  · Trend CBC    Combined hyperlipidemia  Assessment & Plan  · Currently denies taking any statin medications    VTE Pharmacologic Prophylaxis: VTE Score: 4 Moderate Risk (Score 3-4) - Pharmacological DVT Prophylaxis Ordered: enoxaparin (Lovenox)  Code Status: Level 1 - Full Code Level 1  Full Code   Discussion with family: update in AM      Anticipated Length of Stay: Patient will be admitted on an inpatient basis with an anticipated length of stay of greater than 2 midnights secondary to Lower extremity cellulitis  Total Time for Visit, including Counseling / Coordination of Care: 45 minutes Greater than 50% of this total time spent on direct patient counseling and coordination of care  Chief Complaint: Increasing BL lower extremity redness    History of Present Illness:  Juan Cabrera is a 67 y o  female with a PMH of MRSA colonization, BL chronic LE wounds, HTN, and HLP who presents with  Increasing BL lower extremity redness  Patient with history of chronic bilateral lower extremity wounds, with history of MRSA infection, was seen by outpatient wound care today who had concerns for bilateral lower extremity cellulitis  Patient reports having chronic large ulcers on each lower extremity, and reports increasing redness in both over the past 24 hours; with increased drainage and pain from the left wound  Denies fever, chill, recent sick contact, headache, visual disturbance, dizzy/lightheadedness, chest pain, palpitations, SOB, ABD pain, N/V/D, new numbness/tingling/weakness, or other symptom  All questions answered at the bedside the patient's satisfaction  Review of Systems:  Review of Systems   Constitutional: Negative for activity change, appetite change, chills, diaphoresis, fatigue and fever     HENT: Negative for congestion, ear pain, nosebleeds and trouble swallowing  Eyes: Negative for pain and visual disturbance  Respiratory: Negative for apnea, cough, chest tightness, shortness of breath and wheezing  Cardiovascular: Negative for chest pain, palpitations and leg swelling  Gastrointestinal: Negative for abdominal distention, abdominal pain, blood in stool, constipation, diarrhea, nausea and vomiting  Endocrine: Negative for cold intolerance, heat intolerance and polyuria  Genitourinary: Negative for difficulty urinating, dysuria, flank pain and hematuria  Musculoskeletal: Negative for arthralgias, neck pain and neck stiffness  Skin: Positive for color change (redness BL LE) and wound (Chronic BL LE ulcers)  Negative for rash  Neurological: Negative for dizziness, tremors, syncope, weakness, light-headedness, numbness and headaches  Hematological: Negative for adenopathy  All other systems reviewed and are negative  Past Medical and Surgical History:   Past Medical History:   Diagnosis Date    Hyperlipidemia     Hypertension     Osteoporosis     Shingles        Past Surgical History:   Procedure Laterality Date    CATARACT EXTRACTION Bilateral     FOOT SURGERY Right     reconstruction    TOTAL HIP ARTHROPLASTY Bilateral        Meds/Allergies:  Prior to Admission medications    Medication Sig Start Date End Date Taking?  Authorizing Provider   acetaminophen, FOR EMS ONLY, (TYLENOL) 160 mg/5 mL suspension     Historical Provider, MD   atenolol (TENORMIN) 25 mg tablet 25 mg  Patient not taking: Reported on 2/28/2022 4/11/18   Historical Provider, MD   atorvastatin (LIPITOR) 20 mg tablet 20 mg  Patient not taking: Reported on 2/28/2022     Historical Provider, MD   baclofen 10 mg tablet baclofen 10 mg tablet  Patient not taking: Reported on 2/28/2022    Historical Provider, MD   Calcium Carb-Cholecalciferol (CALCIUM 600+D3) 600-200 MG-UNIT TABS Calcium 600 + D(3) 600 mg (1,500 mg)-200 unit tablet 12/1/14   Historical Provider, MD   calcium carbonate-vitamin D (OSCAL-D) 500 mg-200 units per tablet Take 1 tablet by mouth daily 10/1/21   Historical Provider, MD   collagenase (SANTYL) ointment Apply topically daily  Patient not taking: Reported on 2/28/2022  10/22/21   TELLY Ortiz   Cyanocobalamin (B-12) 1000 MCG CAPS  9/8/21   Historical Provider, MD   cyanocobalamin (VITAMIN B-12) 100 MCG tablet 1,000 mcg    Historical Provider, MD   denosumab (PROLIA) 60 mg/mL Prolia 60 mg/mL subcutaneous syringe  Patient not taking: Reported on 2/28/2022 9/6/16   Historical Provider, MD   enalapril (VASOTEC) 20 mg tablet 20 mg  Patient not taking: Reported on 2/28/2022  11/3/17   Historical Provider, MD   ergocalciferol (VITAMIN D2) 50,000 units Take 1 capsule (50,000 Units total) by mouth 2 (two) times a week for 16 doses 5/7/20 11/30/21  TELLY Ortiz   gabapentin (NEURONTIN) 300 mg capsule Take 2 capsules (600 mg total) by mouth daily at bedtime 1/25/22   TELLY Ortiz   gentamicin (GARAMYCIN) 0 1 % ointment Apply topically 2 (two) times a day  Patient not taking: Reported on 2/28/2022 9/30/21   Historical Provider, MD   hydrochlorothiazide (MICROZIDE) 12 5 mg capsule hydrochlorothiazide 12 5 mg capsule  Patient not taking: Reported on 2/28/2022    Historical Provider, MD   Incontinence Supply Disposable (BRIEFS OVERNIGHT MEDIUM) MISC by Does not apply route 3 (three) times a day 10/24/19 11/30/21  TELLY Ortiz   LORazepam (ATIVAN) 0 5 mg tablet lorazepam 0 5 mg tablet  Patient not taking: Reported on 2/28/2022    Historical Provider, MD   MAGNESIUM PO 2 tablets  Patient not taking: Reported on 2/28/2022     Historical Provider, MD   mupirocin (BACTROBAN) 2 % ointment Apply topically 3 (three) times a day  Patient not taking: Reported on 2/28/2022 5/20/20   Cleophas Cogan, CRNP   naproxen (NAPROSYN) 500 mg tablet naproxen 500 mg tablet  Patient not taking: Reported on 2/28/2022 4/10/17   Historical Provider, MD   nystatin (MYCOSTATIN) cream Apply 1 application topically 2 (two) times a day  Patient not taking: Reported on 2/28/2022 9/30/21 9/30/22  Historical Provider, MD   Omega-3 Fatty Acids ([employer] FISH OIL) 1200 MG CAPS Take 1 capsule by mouth Daily  Patient not taking: Reported on 2/28/2022     Historical Provider, MD   traMADol HCl  MG CP24 Take 1 capsule (100 mg total) by mouth daily  Patient not taking: Reported on 2/28/2022  10/24/19   TELLY Morel   traZODone (DESYREL) 100 mg tablet Take 1 tablet (100 mg total) by mouth daily at bedtime  Patient not taking: Reported on 2/28/2022 5/20/20   TELLY Bolden   Wound Dressings (Adaptic Non-Adhering Dressing) PADS Apply 30 each topically 2 (two) times a day 11/11/21   TELLY Morel     I have reviewed home medications with patient personally  Allergies: Allergies   Allergen Reactions    Aspirin     Warfarin Other (See Comments)       Social History:  Marital Status:     Occupation: N/A  Patient Pre-hospital Living Situation: Home  Patient Pre-hospital Level of Mobility: walks  Patient Pre-hospital Diet Restrictions: Heart healthy  Substance Use History:   Social History     Substance and Sexual Activity   Alcohol Use Yes    Alcohol/week: 7 0 standard drinks    Types: 7 Glasses of wine per week     Social History     Tobacco Use   Smoking Status Former Smoker   Smokeless Tobacco Never Used   Tobacco Comment    quit 50 years ago     Social History     Substance and Sexual Activity   Drug Use Never       Family History:  Family History   Family history unknown: Yes       Physical Exam:     Vitals:   Blood Pressure: 123/67 (02/28/22 2119)  Pulse: 93 (02/28/22 2119)  Temperature: 97 5 °F (36 4 °C) (02/28/22 2119)  Temp Source: Oral (02/28/22 2119)  Respirations: 17 (02/28/22 2119)  Height: 5' 2" (157 5 cm) (02/28/22 2119)  Weight - Scale: 58 1 kg (128 lb 1 4 oz) (02/28/22 2119)  SpO2: 97 % (02/28/22 2130)    Physical Exam  Vitals and nursing note reviewed  Constitutional:       General: She is not in acute distress  Appearance: Normal appearance  HENT:      Head: Normocephalic and atraumatic  Right Ear: External ear normal       Left Ear: External ear normal       Nose: Nose normal       Mouth/Throat:      Mouth: Mucous membranes are moist    Eyes:      Pupils: Pupils are equal, round, and reactive to light  Cardiovascular:      Rate and Rhythm: Normal rate and regular rhythm  Pulses: Normal pulses  Heart sounds: Normal heart sounds  No murmur heard  Pulmonary:      Effort: Pulmonary effort is normal  No respiratory distress  Breath sounds: Normal breath sounds  No wheezing or rales  Chest:      Chest wall: No tenderness  Abdominal:      General: Bowel sounds are normal  There is no distension  Palpations: Abdomen is soft  There is no mass  Tenderness: There is no abdominal tenderness  There is no guarding  Musculoskeletal:         General: No swelling or tenderness  Cervical back: Normal range of motion and neck supple  No rigidity or tenderness  Right lower leg: No edema  Left lower leg: No edema  Skin:     General: Skin is warm and dry  Capillary Refill: Capillary refill takes less than 2 seconds  Findings: Erythema (BL LE extremities ) and lesion (Large wet ulcers on medial BL lower extremities with surrounding erythema and skin flaking) present  No rash  Neurological:      General: No focal deficit present  Mental Status: She is alert     Psychiatric:         Mood and Affect: Mood normal           Additional Data:     Lab Results:  Results from last 7 days   Lab Units 02/28/22  1800   WBC Thousand/uL 13 03*   HEMOGLOBIN g/dL 11 1*   HEMATOCRIT % 40 9   PLATELETS Thousands/uL 786*   NEUTROS PCT % 70   LYMPHS PCT % 9*   MONOS PCT % 16*   EOS PCT % 3     Results from last 7 days   Lab Units 02/28/22  1800   SODIUM mmol/L 139   POTASSIUM mmol/L 4 0 CHLORIDE mmol/L 106   CO2 mmol/L 23   BUN mg/dL 14   CREATININE mg/dL 0 59*   ANION GAP mmol/L 10   CALCIUM mg/dL 7 7*   ALBUMIN g/dL 2 6*   TOTAL BILIRUBIN mg/dL 0 50   ALK PHOS U/L 113   ALT U/L 15   AST U/L 30   GLUCOSE RANDOM mg/dL 72                 Results from last 7 days   Lab Units 02/28/22  2245 02/28/22  1800   LACTIC ACID mmol/L  --  1 2   PROCALCITONIN ng/ml 0 15  --        Imaging: Personally reviewed the following imaging: xray(s)  XR foot 3+ views RIGHT    (Results Pending)       EKG and Other Studies Reviewed on Admission:   · EKG: No EKG obtained  ** Please Note: This note has been constructed using a voice recognition system   **

## 2022-03-01 NOTE — ASSESSMENT & PLAN NOTE
· Platelets 873  · Patient appears to have chronic elevation with possible acute increase reactive in the setting of acute infection  · Trend CBC

## 2022-03-01 NOTE — PLAN OF CARE
Problem: Potential for Falls  Goal: Patient will remain free of falls  Description: INTERVENTIONS:  - Educate patient/family on patient safety including physical limitations  - Instruct patient to call for assistance with activity   - Consult OT/PT to assist with strengthening/mobility   - Keep Call bell within reach  - Keep bed low and locked with side rails adjusted as appropriate  - Keep care items and personal belongings within reach  - Initiate and maintain comfort rounds  - Make Fall Risk Sign visible to staff  - Offer Toileting every    Hours, in advance of need  - Initiate/Maintain   alarm  - Obtain necessary fall risk management equipment:     - Apply yellow socks and bracelet for high fall risk patients  - Consider moving patient to room near nurses station  Outcome: Progressing     Problem: MOBILITY - ADULT  Goal: Maintain or return to baseline ADL function  Description: INTERVENTIONS:  -  Assess patient's ability to carry out ADLs; assess patient's baseline for ADL function and identify physical deficits which impact ability to perform ADLs (bathing, care of mouth/teeth, toileting, grooming, dressing, etc )  - Assess/evaluate cause of self-care deficits   - Assess range of motion  - Assess patient's mobility; develop plan if impaired  - Assess patient's need for assistive devices and provide as appropriate  - Encourage maximum independence but intervene and supervise when necessary  - Involve family in performance of ADLs  - Assess for home care needs following discharge   - Consider OT consult to assist with ADL evaluation and planning for discharge  - Provide patient education as appropriate  Outcome: Progressing  Goal: Maintains/Returns to pre admission functional level  Description: INTERVENTIONS:  - Perform BMAT or MOVE assessment daily    - Set and communicate daily mobility goal to care team and patient/family/caregiver     - Collaborate with rehabilitation services on mobility goals if consulted  - Perform Range of Motion    times a day  - Reposition patient every    hours    - Dangle patient    times a day  - Stand patient    times a day  - Ambulate patient    times a day  - Out of bed to chair    times a day   - Out of bed for meals    times a day  - Out of bed for toileting  - Record patient progress and toleration of activity level   Outcome: Progressing     Problem: PAIN - ADULT  Goal: Verbalizes/displays adequate comfort level or baseline comfort level  Description: Interventions:  - Encourage patient to monitor pain and request assistance  - Assess pain using appropriate pain scale  - Administer analgesics based on type and severity of pain and evaluate response  - Implement non-pharmacological measures as appropriate and evaluate response  - Consider cultural and social influences on pain and pain management  - Notify physician/advanced practitioner if interventions unsuccessful or patient reports new pain  Outcome: Progressing     Problem: INFECTION - ADULT  Goal: Absence or prevention of progression during hospitalization  Description: INTERVENTIONS:  - Assess and monitor for signs and symptoms of infection  - Monitor lab/diagnostic results  - Monitor all insertion sites, i e  indwelling lines, tubes, and drains  - Monitor endotracheal if appropriate and nasal secretions for changes in amount and color  - Dawes appropriate cooling/warming therapies per order  - Administer medications as ordered  - Instruct and encourage patient and family to use good hand hygiene technique  - Identify and instruct in appropriate isolation precautions for identified infection/condition  Outcome: Progressing  Goal: Absence of fever/infection during neutropenic period  Description: INTERVENTIONS:  - Monitor WBC    Outcome: Progressing     Problem: SAFETY ADULT  Goal: Patient will remain free of falls  Description: INTERVENTIONS:  - Educate patient/family on patient safety including physical limitations  - Instruct patient to call for assistance with activity   - Consult OT/PT to assist with strengthening/mobility   - Keep Call bell within reach  - Keep bed low and locked with side rails adjusted as appropriate  - Keep care items and personal belongings within reach  - Initiate and maintain comfort rounds  - Make Fall Risk Sign visible to staff  - Offer Toileting every    Hours, in advance of need  - Initiate/Maintain   alarm  - Obtain necessary fall risk management equipment:     - Apply yellow socks and bracelet for high fall risk patients  - Consider moving patient to room near nurses station  Outcome: Progressing  Goal: Maintain or return to baseline ADL function  Description: INTERVENTIONS:  -  Assess patient's ability to carry out ADLs; assess patient's baseline for ADL function and identify physical deficits which impact ability to perform ADLs (bathing, care of mouth/teeth, toileting, grooming, dressing, etc )  - Assess/evaluate cause of self-care deficits   - Assess range of motion  - Assess patient's mobility; develop plan if impaired  - Assess patient's need for assistive devices and provide as appropriate  - Encourage maximum independence but intervene and supervise when necessary  - Involve family in performance of ADLs  - Assess for home care needs following discharge   - Consider OT consult to assist with ADL evaluation and planning for discharge  - Provide patient education as appropriate  Outcome: Progressing  Goal: Maintains/Returns to pre admission functional level  Description: INTERVENTIONS:  - Perform BMAT or MOVE assessment daily    - Set and communicate daily mobility goal to care team and patient/family/caregiver  - Collaborate with rehabilitation services on mobility goals if consulted  - Perform Range of Motion    times a day  - Reposition patient every    hours    - Dangle patient    times a day  - Stand patient    times a day  - Ambulate patient    times a day  - Out of bed to chair    times a day   - Out of bed for meals times a day  - Out of bed for toileting  - Record patient progress and toleration of activity level   Outcome: Progressing     Problem: DISCHARGE PLANNING  Goal: Discharge to home or other facility with appropriate resources  Description: INTERVENTIONS:  - Identify barriers to discharge w/patient and caregiver  - Arrange for needed discharge resources and transportation as appropriate  - Identify discharge learning needs (meds, wound care, etc )  - Arrange for interpretive services to assist at discharge as needed  - Refer to Case Management Department for coordinating discharge planning if the patient needs post-hospital services based on physician/advanced practitioner order or complex needs related to functional status, cognitive ability, or social support system  Outcome: Progressing     Problem: Knowledge Deficit  Goal: Patient/family/caregiver demonstrates understanding of disease process, treatment plan, medications, and discharge instructions  Description: Complete learning assessment and assess knowledge base  Interventions:  - Provide teaching at level of understanding  - Provide teaching via preferred learning methods  Outcome: Progressing     Problem: Nutrition/Hydration-ADULT  Goal: Nutrient/Hydration intake appropriate for improving, restoring or maintaining nutritional needs  Description: Monitor and assess patient's nutrition/hydration status for malnutrition  Collaborate with interdisciplinary team and initiate plan and interventions as ordered  Monitor patient's weight and dietary intake as ordered or per policy  Utilize nutrition screening tool and intervene as necessary  Determine patient's food preferences and provide high-protein, high-caloric foods as appropriate       INTERVENTIONS:  - Monitor oral intake, urinary output, labs, and treatment plans  - Assess nutrition and hydration status and recommend course of action  - Evaluate amount of meals eaten  - Assist patient with eating if necessary   - Allow adequate time for meals  - Recommend/ encourage appropriate diets, oral nutritional supplements, and vitamin/mineral supplements  - Order, calculate, and assess calorie counts as needed  - Recommend, monitor, and adjust tube feedings and TPN/PPN based on assessed needs  - Assess need for intravenous fluids  - Provide specific nutrition/hydration education as appropriate  - Include patient/family/caregiver in decisions related to nutrition  Outcome: Progressing     Problem: Prexisting or High Potential for Compromised Skin Integrity  Goal: Skin integrity is maintained or improved  Description: INTERVENTIONS:  - Identify patients at risk for skin breakdown  - Assess and monitor skin integrity  - Assess and monitor nutrition and hydration status  - Monitor labs   - Assess for incontinence   - Turn and reposition patient  - Assist with mobility/ambulation  - Relieve pressure over bony prominences  - Avoid friction and shearing  - Provide appropriate hygiene as needed including keeping skin clean and dry  - Evaluate need for skin moisturizer/barrier cream  - Collaborate with interdisciplinary team   - Patient/family teaching  - Consider wound care consult   Outcome: Progressing

## 2022-03-01 NOTE — PROGRESS NOTES
Vancomycin Assessment    Emy Loyd is a 67 y o  female who is currently receiving vancomycin 1000mg IV Q12H for skin-soft tissue infection     Relevant clinical data and objective history reviewed:  Creatinine   Date Value Ref Range Status   02/28/2022 0 59 (L) 0 60 - 1 30 mg/dL Final     Comment:     Standardized to IDMS reference method   11/05/2021 0 68 0 60 - 1 30 mg/dL Final     Comment:     Standardized to IDMS reference method   11/04/2021 0 64 0 60 - 1 30 mg/dL Final     Comment:     Standardized to IDMS reference method     /86   Pulse 86   Temp 97 6 °F (36 4 °C) (Oral)   Resp 18   SpO2 97%   No intake/output data recorded  Lab Results   Component Value Date/Time    BUN 14 02/28/2022 06:00 PM    WBC 13 03 (H) 02/28/2022 06:00 PM    HGB 11 1 (L) 02/28/2022 06:00 PM    HCT 40 9 02/28/2022 06:00 PM    MCV 72 (L) 02/28/2022 06:00 PM     (H) 02/28/2022 06:00 PM     Temp Readings from Last 3 Encounters:   02/28/22 97 6 °F (36 4 °C) (Oral)   11/11/21 (!) 96 9 °F (36 1 °C) (Tympanic)   11/05/21 (!) 97 3 °F (36 3 °C)     Vancomycin Days of Therapy: 1    Assessment/Plan  The patient is currently on vancomycin utilizing scheduled dosing based on actual body weight  Baseline risks associated with therapy include: concomitant nephrotoxic medications, advanced age, and dehydration  The patient is currently receiving 1000mg IV Q12H and after clinical evaluation will be changed to 750mg IV Q12H  Pharmacy will also follow closely for s/sx of nephrotoxicity, infusion reactions, and appropriateness of therapy  BMP and CBC will be ordered per protocol  Plan for trough as patient approaches steady state, prior to the 4th  dose at approximately 0700 on 3/2/22  Due to infection severity, will target a trough of 15-20 (appropriate for most indications)   Pharmacy will continue to follow the patients culture results and clinical progress daily      Liane Flores, Pharmacist

## 2022-03-01 NOTE — PROGRESS NOTES
Vancomycin IV Pharmacy-to-Dose Consultation    Reynaldo Palmer is a 67 y o  female who is currently receiving Vancomycin IV with management by the Pharmacy Consult service  Assessment/Plan:  The patient was reviewed  Renal function is stable and no signs or symptoms of nephrotoxicity and/or infusion reactions were documented in the chart  Based on todays assessment, continue current vancomycin (day # 2) dosing of 750mg IV q12h, with a plan for trough to be drawn at 0700 on 3/2  We will continue to follow the patients culture results and clinical progress daily      Shiraz Celis, Pharmacist

## 2022-03-01 NOTE — DISCHARGE INSTR - OTHER ORDERS
Skin care Plan:  1-Protect sacrum w/Allevyn foam, severino with P, change q3d and PRN, peel back and check skin q-shift  2-Turn/reposition q2h for pressure re-distribution on skin   3-Elevate heels to offload pressure  4-Moisturize skin daily with skin nourishing cream  5-Ehob cushion in chair when out of bed  6-Hydraguard to bilateral heels BID and PRN   7-B/L leg- Cleanse with NSS, pat dry  Apply Maxorb Alginate with silver over wound beds, cover with ABD pad, wrap with Dayday  Change daily and PRN for soilage/dislodgment  Follow-up with [facility] wound care center as an outpatient- call [phone] for appt

## 2022-03-01 NOTE — UTILIZATION REVIEW
Initial Clinical Review    Admission: Date/Time/Statement:   Admission Orders (From admission, onward)     Ordered        02/28/22 1920  Inpatient Admission  Once                      Orders Placed This Encounter   Procedures    Inpatient Admission     Standing Status:   Standing     Number of Occurrences:   1     Order Specific Question:   Level of Care     Answer:   Med Surg [16]     Order Specific Question:   Estimated length of stay     Answer:   More than 2 Midnights     Order Specific Question:   Certification     Answer:   I certify that inpatient services are medically necessary for this patient for a duration of greater than two midnights  See H&P and MD Progress Notes for additional information about the patient's course of treatment  ED Arrival Information     Expected Arrival Acuity    - 2/28/2022 16:26 Urgent         Means of arrival Escorted by Service Admission type    Walk-In Self Hospitalist Urgent         Arrival complaint    leg wound care        Chief Complaint   Patient presents with    Wound Check     sent over from wound care for futher evaluation of b/l LE wounds  Initial Presentation: 68 yo female to ED from home w/ inc BL LE redness, hx chronic BLE wounds  W/ hx MRSA , HTN < HLD   Was seen by OP wound care and concern for BLE cellulitis   C/o inc drainage and pain   Given rocephin and vanco in ED   Admitted IP status for BLE wound cellulitis   F/u pct and wound cx, cont rocephin and vanco , trend wbc and f/u infectious labs and localized wound care  consider ID consult  HTN no home meds , monitor BP   plt ct 786 , trend    PE : BLE large wet , medial ulcers w/ surrounding erythema and warmth               Date:  3/1  Day 2: Nonhealing and infected bilateral lower extremity wounds with cellulitis- Wound Care and Podiatry consulted  Continue intravenous antibiotics  Wound cx pending , wbc is down trending   MRSA cx pending   Obtain venous duplex        3/2 Podiatry Consult chronic venous stasis ulcerations bilateral lower extremities and periwound cellulitis  Wbc is trending down   No sign of DVT   Cont IV abx   rec daily dressing changes , calcium alginate w/ silver , ABD padding , dry dressing   Cont w/ local wound care   ED Triage Vitals   Temperature Pulse Respirations Blood Pressure SpO2   02/28/22 1629 02/28/22 1629 02/28/22 1629 02/28/22 1629 02/28/22 1629   97 6 °F (36 4 °C) 88 18 141/69 99 %      Temp Source Heart Rate Source Patient Position - Orthostatic VS BP Location FiO2 (%)   02/28/22 1629 02/28/22 1629 02/28/22 1629 02/28/22 1629 --   Oral Monitor Sitting Left arm       Pain Score       02/28/22 1930       No Pain          Wt Readings from Last 1 Encounters:   02/28/22 58 1 kg (128 lb 1 4 oz)     Additional Vital Signs:   03/01/22 06:46:19 98 8 °F (37 1 °C) 93 18 100/59 73 93 % -- --   03/01/22 06:46:01 98 8 °F (37 1 °C) 88 17 100/59 73 93 % -- --   02/28/22 2130 -- -- -- -- -- 97 % None (Room air) --   02/28/22 2119 97 5 °F (36 4 °C) 93 17 123/67 89 95 % None (Room air) Lying   02/28/22 1930 -- 86 -- 128/86 103 97 % --        Pertinent Labs/Diagnostic Test Results:   3/1 Venous duplex RIGHT LOWER LIMB:  No evidence of acute or chronic deep vein thrombosis  No evidence of superficial thrombophlebitis noted  Doppler evaluation shows a normal response to augmentation maneuvers  Popliteal, posterior tibial and anterior tibial arterial Doppler waveforms are  triphasic  LEFT LOWER LIMB:  No evidence of acute or chronic deep vein thrombosis  No evidence of superficial thrombophlebitis noted  Doppler evaluation shows a normal response to augmentation maneuvers  Popliteal, posterior tibial and anterior tibial arterial Doppler waveforms are  triphasic  2/28 R foot xray No acute osseous abnormality    Post surgical changes at the 1st metatarsophalangeal joint  Additional chronic appearing findings as noted above    Results from last 7 days   Lab Units 03/01/22  0433 02/28/22  1800   WBC Thousand/uL 11 03* 13 03*   HEMOGLOBIN g/dL 9 2* 11 1*   HEMATOCRIT % 34 3* 40 9   PLATELETS Thousands/uL 654* 786*   NEUTROS ABS Thousands/µL  --  9 27*     Results from last 7 days   Lab Units 03/01/22  0433 02/28/22  1800   SODIUM mmol/L 139 139   POTASSIUM mmol/L 4 2 4 0   CHLORIDE mmol/L 107 106   CO2 mmol/L 27 23   ANION GAP mmol/L 5 10   BUN mg/dL 14 14   CREATININE mg/dL 0 66 0 59*   EGFR ml/min/1 73sq m 85 88   CALCIUM mg/dL 8 1* 7 7*     Results from last 7 days   Lab Units 02/28/22  1800   AST U/L 30   ALT U/L 15   ALK PHOS U/L 113   TOTAL PROTEIN g/dL 7 9   ALBUMIN g/dL 2 6*   TOTAL BILIRUBIN mg/dL 0 50     Results from last 7 days   Lab Units 03/01/22  0433 02/28/22  1800   GLUCOSE RANDOM mg/dL 94 72     Results from last 7 days   Lab Units 03/01/22  0433 02/28/22  2245   PROCALCITONIN ng/ml 0 16 0 15     Results from last 7 days   Lab Units 02/28/22  1800   LACTIC ACID mmol/L 1 2     Results from last 7 days   Lab Units 02/28/22  1800   BLOOD CULTURE  Received in Microbiology Lab  Culture in Progress  Received in Microbiology Lab  Culture in Progress       ED Treatment:   Medication Administration from 02/28/2022 1626 to 02/28/2022 2110       Date/Time Order Dose Route Action     02/28/2022 2007 vancomycin (VANCOCIN) 1000 mg in sodium chloride 0 9% 250 mL IVPB 1,000 mg Intravenous Given     02/28/2022 1843 ceftriaxone (ROCEPHIN) 1 g/50 mL in dextrose IVPB 1,000 mg Intravenous New Bag        Past Medical History:   Diagnosis Date    Hyperlipidemia     Hypertension     Osteoporosis     Shingles      Present on Admission:   Combined hyperlipidemia   Essential (primary) hypertension   Wound cellulitis      Admitting Diagnosis: Visit for wound check [Z51 89]  Cellulitis of both lower extremities [L03 115, L03 116]  Age/Sex: 67 y o  female  Admission Orders:  Scheduled Medications:  calcium carbonate, 1 tablet, Oral, Daily With Breakfast  cefTRIAXone, 1,000 mg, Intravenous, Q24H  cyanocobalamin, 1,000 mcg, Oral, Daily  enoxaparin, 40 mg, Subcutaneous, Daily  gabapentin, 600 mg, Oral, HS  vancomycin, 12 5 mg/kg, Intravenous, Q12H      Continuous IV Infusions:     PRN Meds:  acetaminophen, 650 mg, Oral, Q6H PRN  morphine injection, 2 mg, Intravenous, Q4H PRN  oxyCODONE, 2 5 mg, Oral, Q4H PRN  oxyCODONE, 5 mg, Oral, Q4H PRN        IP CONSULT TO PHARMACY  IP CONSULT TO PODIATRY    Network Utilization Review Department  ATTENTION: Please call with any questions or concerns to [phone] and carefully listen to the prompts so that you are directed to the right person  All voicemails are confidential   [facility] all requests for admission clinical reviews, approved or denied determinations and any other requests to dedicated fax number below belonging to the campus where the patient is receiving treatment   List of dedicated fax numbers for the Facilities:  [address] DENIALS (Administrative/Medical Necessity) [phone]   [address] (Maternity/NICU/Pediatrics) [phone]   [address] [place] [address]  [address] [place] [address] [address] [place] [address] [address] [facility] [facility] [phone]

## 2022-03-02 LAB
ANION GAP SERPL CALCULATED.3IONS-SCNC: 8 MMOL/L (ref 4–13)
BASOPHILS # BLD AUTO: 0.05 THOUSANDS/ΜL (ref 0–0.1)
BASOPHILS NFR BLD AUTO: 1 % (ref 0–1)
BUN SERPL-MCNC: 11 MG/DL (ref 5–25)
CALCIUM SERPL-MCNC: 8.8 MG/DL (ref 8.3–10.1)
CHLORIDE SERPL-SCNC: 105 MMOL/L (ref 100–108)
CO2 SERPL-SCNC: 26 MMOL/L (ref 21–32)
CREAT SERPL-MCNC: 0.62 MG/DL (ref 0.6–1.3)
EOSINOPHIL # BLD AUTO: 0.31 THOUSAND/ΜL (ref 0–0.61)
EOSINOPHIL NFR BLD AUTO: 3 % (ref 0–6)
ERYTHROCYTE [DISTWIDTH] IN BLOOD BY AUTOMATED COUNT: 17 % (ref 11.6–15.1)
GFR SERPL CREATININE-BSD FRML MDRD: 87 ML/MIN/1.73SQ M
GLUCOSE SERPL-MCNC: 97 MG/DL (ref 65–140)
HCT VFR BLD AUTO: 36.1 % (ref 34.8–46.1)
HGB BLD-MCNC: 9.8 G/DL (ref 11.5–15.4)
IMM GRANULOCYTES # BLD AUTO: 0.2 THOUSAND/UL (ref 0–0.2)
IMM GRANULOCYTES NFR BLD AUTO: 2 % (ref 0–2)
LYMPHOCYTES # BLD AUTO: 0.99 THOUSANDS/ΜL (ref 0.6–4.47)
LYMPHOCYTES NFR BLD AUTO: 10 % (ref 14–44)
MCH RBC QN AUTO: 19.4 PG (ref 26.8–34.3)
MCHC RBC AUTO-ENTMCNC: 27.1 G/DL (ref 31.4–37.4)
MCV RBC AUTO: 72 FL (ref 82–98)
MONOCYTES # BLD AUTO: 2.29 THOUSAND/ΜL (ref 0.17–1.22)
MONOCYTES NFR BLD AUTO: 24 % (ref 4–12)
NEUTROPHILS # BLD AUTO: 5.71 THOUSANDS/ΜL (ref 1.85–7.62)
NEUTS SEG NFR BLD AUTO: 60 % (ref 43–75)
NRBC BLD AUTO-RTO: 0 /100 WBCS
PLATELET # BLD AUTO: 618 THOUSANDS/UL (ref 149–390)
PMV BLD AUTO: 10.3 FL (ref 8.9–12.7)
POTASSIUM SERPL-SCNC: 4.4 MMOL/L (ref 3.5–5.3)
RBC # BLD AUTO: 5.05 MILLION/UL (ref 3.81–5.12)
SODIUM SERPL-SCNC: 139 MMOL/L (ref 136–145)
VANCOMYCIN TROUGH SERPL-MCNC: 18.2 UG/ML (ref 10–20)
VANCOMYCIN TROUGH SERPL-MCNC: 19.5 UG/ML (ref 10–20)
WBC # BLD AUTO: 9.55 THOUSAND/UL (ref 4.31–10.16)

## 2022-03-02 PROCEDURE — 80202 ASSAY OF VANCOMYCIN: CPT | Performed by: INTERNAL MEDICINE

## 2022-03-02 PROCEDURE — 80202 ASSAY OF VANCOMYCIN: CPT

## 2022-03-02 PROCEDURE — 99232 SBSQ HOSP IP/OBS MODERATE 35: CPT | Performed by: INTERNAL MEDICINE

## 2022-03-02 PROCEDURE — 85025 COMPLETE CBC W/AUTO DIFF WBC: CPT | Performed by: INTERNAL MEDICINE

## 2022-03-02 PROCEDURE — 80048 BASIC METABOLIC PNL TOTAL CA: CPT

## 2022-03-02 RX ADMIN — ENOXAPARIN SODIUM 40 MG: 40 INJECTION SUBCUTANEOUS at 08:34

## 2022-03-02 RX ADMIN — CYANOCOBALAMIN TAB 500 MCG 1000 MCG: 500 TAB at 08:34

## 2022-03-02 RX ADMIN — CALCIUM 1 TABLET: 500 TABLET ORAL at 08:34

## 2022-03-02 RX ADMIN — VANCOMYCIN HYDROCHLORIDE 750 MG: 750 INJECTION, SOLUTION INTRAVENOUS at 22:16

## 2022-03-02 RX ADMIN — GABAPENTIN 600 MG: 300 CAPSULE ORAL at 22:16

## 2022-03-02 RX ADMIN — CEFTRIAXONE SODIUM 1000 MG: 10 INJECTION, POWDER, FOR SOLUTION INTRAVENOUS at 16:59

## 2022-03-02 RX ADMIN — VANCOMYCIN HYDROCHLORIDE 750 MG: 750 INJECTION, SOLUTION INTRAVENOUS at 08:34

## 2022-03-02 NOTE — PLAN OF CARE
Problem: Potential for Falls  Goal: Patient will remain free of falls  Description: INTERVENTIONS:  - Educate patient/family on patient safety including physical limitations  - Instruct patient to call for assistance with activity   - Consult OT/PT to assist with strengthening/mobility   - Keep Call bell within reach  - Keep bed low and locked with side rails adjusted as appropriate  - Keep care items and personal belongings within reach  - Initiate and maintain comfort rounds  - Make Fall Risk Sign visible to staff  - Offer Toileting every  Hours, in advance of need  - Initiate/Maintain alarm  - Obtain necessary fall risk management equipment:   - Apply yellow socks and bracelet for high fall risk patients  - Consider moving patient to room near nurses station  Outcome: Progressing     Problem: MOBILITY - ADULT  Goal: Maintain or return to baseline ADL function  Description: INTERVENTIONS:  -  Assess patient's ability to carry out ADLs; assess patient's baseline for ADL function and identify physical deficits which impact ability to perform ADLs (bathing, care of mouth/teeth, toileting, grooming, dressing, etc )  - Assess/evaluate cause of self-care deficits   - Assess range of motion  - Assess patient's mobility; develop plan if impaired  - Assess patient's need for assistive devices and provide as appropriate  - Encourage maximum independence but intervene and supervise when necessary  - Involve family in performance of ADLs  - Assess for home care needs following discharge   - Consider OT consult to assist with ADL evaluation and planning for discharge  - Provide patient education as appropriate  Outcome: Progressing  Goal: Maintains/Returns to pre admission functional level  Description: INTERVENTIONS:  - Perform BMAT or MOVE assessment daily    - Set and communicate daily mobility goal to care team and patient/family/caregiver     - Collaborate with rehabilitation services on mobility goals if consulted  - Perform Range of Motion  times a day  - Reposition patient every  hours    - Dangle patient  times a day  - Stand patient  times a day  - Ambulate patient  times a day  - Out of bed to chair  times a day   - Out of bed for meals  times a day  - Out of bed for toileting  - Record patient progress and toleration of activity level   Outcome: Progressing     Problem: PAIN - ADULT  Goal: Verbalizes/displays adequate comfort level or baseline comfort level  Description: Interventions:  - Encourage patient to monitor pain and request assistance  - Assess pain using appropriate pain scale  - Administer analgesics based on type and severity of pain and evaluate response  - Implement non-pharmacological measures as appropriate and evaluate response  - Consider cultural and social influences on pain and pain management  - Notify physician/advanced practitioner if interventions unsuccessful or patient reports new pain  Outcome: Progressing     Problem: INFECTION - ADULT  Goal: Absence or prevention of progression during hospitalization  Description: INTERVENTIONS:  - Assess and monitor for signs and symptoms of infection  - Monitor lab/diagnostic results  - Monitor all insertion sites, i e  indwelling lines, tubes, and drains  - Monitor endotracheal if appropriate and nasal secretions for changes in amount and color  - [place] appropriate cooling/warming therapies per order  - Administer medications as ordered  - Instruct and encourage patient and family to use good hand hygiene technique  - Identify and instruct in appropriate isolation precautions for identified infection/condition  Outcome: Progressing  Goal: Absence of fever/infection during neutropenic period  Description: INTERVENTIONS:  - Monitor WBC    Outcome: Progressing     Problem: SAFETY ADULT  Goal: Patient will remain free of falls  Description: INTERVENTIONS:  - Educate patient/family on patient safety including physical limitations  - Instruct patient to call for assistance with activity   - Consult OT/PT to assist with strengthening/mobility   - Keep Call bell within reach  - Keep bed low and locked with side rails adjusted as appropriate  - Keep care items and personal belongings within reach  - Initiate and maintain comfort rounds  - Make Fall Risk Sign visible to staff  - Offer Toileting every  Hours, in advance of need  - Initiate/Maintain alarm  - Obtain necessary fall risk management equipment:   - Apply yellow socks and bracelet for high fall risk patients  - Consider moving patient to room near nurses station  Outcome: Progressing  Goal: Maintain or return to baseline ADL function  Description: INTERVENTIONS:  -  Assess patient's ability to carry out ADLs; assess patient's baseline for ADL function and identify physical deficits which impact ability to perform ADLs (bathing, care of mouth/teeth, toileting, grooming, dressing, etc )  - Assess/evaluate cause of self-care deficits   - Assess range of motion  - Assess patient's mobility; develop plan if impaired  - Assess patient's need for assistive devices and provide as appropriate  - Encourage maximum independence but intervene and supervise when necessary  - Involve family in performance of ADLs  - Assess for home care needs following discharge   - Consider OT consult to assist with ADL evaluation and planning for discharge  - Provide patient education as appropriate  Outcome: Progressing  Goal: Maintains/Returns to pre admission functional level  Description: INTERVENTIONS:  - Perform BMAT or MOVE assessment daily    - Set and communicate daily mobility goal to care team and patient/family/caregiver  - Collaborate with rehabilitation services on mobility goals if consulted  - Perform Range of Motion  times a day  - Reposition patient every  hours    - Dangle patient  times a day  - Stand patient  times a day  - Ambulate patient  times a day  - Out of bed to chair  times a day   - Out of bed for meals times a day  - Out of bed for toileting  - Record patient progress and toleration of activity level   Outcome: Progressing     Problem: DISCHARGE PLANNING  Goal: Discharge to home or other facility with appropriate resources  Description: INTERVENTIONS:  - Identify barriers to discharge w/patient and caregiver  - Arrange for needed discharge resources and transportation as appropriate  - Identify discharge learning needs (meds, wound care, etc )  - Arrange for interpretive services to assist at discharge as needed  - Refer to Case Management Department for coordinating discharge planning if the patient needs post-hospital services based on physician/advanced practitioner order or complex needs related to functional status, cognitive ability, or social support system  Outcome: Progressing     Problem: Knowledge Deficit  Goal: Patient/family/caregiver demonstrates understanding of disease process, treatment plan, medications, and discharge instructions  Description: Complete learning assessment and assess knowledge base  Interventions:  - Provide teaching at level of understanding  - Provide teaching via preferred learning methods  Outcome: Progressing     Problem: Nutrition/Hydration-ADULT  Goal: Nutrient/Hydration intake appropriate for improving, restoring or maintaining nutritional needs  Description: Monitor and assess patient's nutrition/hydration status for malnutrition  Collaborate with interdisciplinary team and initiate plan and interventions as ordered  Monitor patient's weight and dietary intake as ordered or per policy  Utilize nutrition screening tool and intervene as necessary  Determine patient's food preferences and provide high-protein, high-caloric foods as appropriate       INTERVENTIONS:  - Monitor oral intake, urinary output, labs, and treatment plans  - Assess nutrition and hydration status and recommend course of action  - Evaluate amount of meals eaten  - Assist patient with eating if necessary   - Allow adequate time for meals  - Recommend/ encourage appropriate diets, oral nutritional supplements, and vitamin/mineral supplements  - Order, calculate, and assess calorie counts as needed  - Assess need for intravenous fluids  - Provide nutrition/hydration education as appropriate  - Include patient/family/caregiver in decisions related to nutrition  Outcome: Progressing     Problem: Prexisting or High Potential for Compromised Skin Integrity  Goal: Skin integrity is maintained or improved  Description: INTERVENTIONS:  - Identify patients at risk for skin breakdown  - Assess and monitor skin integrity  - Assess and monitor nutrition and hydration status  - Monitor labs   - Assess for incontinence   - Turn and reposition patient  - Assist with mobility/ambulation  - Relieve pressure over bony prominences  - Avoid friction and shearing  - Provide appropriate hygiene as needed including keeping skin clean and dry  - Evaluate need for skin moisturizer/barrier cream  - Collaborate with interdisciplinary team   - Patient/family teaching  - Consider wound care consult   Outcome: Progressing

## 2022-03-02 NOTE — ASSESSMENT & PLAN NOTE
· Platelets 321  · Patient appears to have chronic elevation with possible acute increase reactive in the setting of acute infection  · Trend CBC periodically

## 2022-03-02 NOTE — ASSESSMENT & PLAN NOTE
Patient with history of chronic bilateral lower extremity wounds, with history of MRSA infection, was seen by outpatient wound care today who had concerns for bilateral lower extremity cellulitis  Patient reports having chronic large ulcers on each lower extremity, and reports increasing redness in both over the past 24 hours; with increased drainage and pain from the left wound  Denies fever, chill, recent sick contact, headache, visual disturbance, dizzy/lightheadedness, chest pain, palpitations, SOB, ABD pain, N/V/D, new numbness/tingling/weakness, or other symptom  /60   Pulse 71   Temp 97 7 °F (36 5 °C)   Resp 17   Ht 5' 2" (1 575 m)   Wt 58 1 kg (128 lb 1 4 oz)   SpO2 96%   BMI 23 43 kg/m²     · Has history of recurrent BL LE cellulitis w/MRSA infection (last d/c on 11/2021)  · Patient reports increasing redness in BL LE's over past 24h with increased drainage and pain in the left wound  · Seen by outpatient wound care today who had concerns of infection and recommended ED visit  · BL lower extremity large, wet, medial ulcers with surrounding erythema and warmth, without underlying fluctuance felt  · Afebrile;WBC 13 03; ANC 9 27  · XR R-foot performed in ED awaiting official read   · ED gave rocephin + vanco   · Procal and wound cultures ordered   · Continue rocephin + vanco f/u wound cx; preliminary revealing staph and grp G strep   F/u sensitivities  · Wound care consult  · Status post arterial duplex

## 2022-03-02 NOTE — CONSULTS
Consultation - Podiatric Surgery    Matty Ko 67 y o  female MRN: [id number]  Unit/Bed#: -01 Encounter: [id number]      Assessment/Plan     Assessment:  62-year-old female with chronic venous stasis ulcerations bilateral lower extremities and periwound cellulitis  Plan:  -Patient seen and examined at bedside this morning  Clinical images are consistent with today's presentation   -Bilateral feet x-rays reviewed:  No radiographic evidence of osteomyelitis  -VAS lower limb venous duplex study reviewed:  No evidence of acute or chronic deep vein thrombosis   -White blood cell count is trending downward   -Agree with Wound care's assessment, continue IV antibiotics per medicine recommendations   -There is no anticipated podiatric surgical intervention for this patient during this admission   -Recommend daily dressing changes to lower extremities which would include: calcium alginate with silver, ABD padding, dry dressing   -Continue with local wound care to bilateral lower extremities   -Patient follows with [address] [facility] [place])  Patient should continue [facility] wound care as an outpatient for continued wound management   -Podiatry will sign off please reconsult if further pedal complaints arise  History of Present Illness   Physician Requesting Consult: Foster Miller MD  Reason for Consult / Principal Problem:  Bilateral lower extremity cellulitis and chronic venous stasis wounds  HPI: Matty Ko is a 67y o  year old female who presents with bilateral lower extremity cellulitis and chronic venous stasis wound  Patient states that she follows with [address] as an outpatient Arline Tidwell PA-C)  Patient was advised to report to hospital for IV antibiotics by wound care center on February 28th  Patient reports decreased pain and irritation to her lower extremities during this admission    Patient states that she has had the wounds for a long period of time     Consults    Review of Systems   All other systems reviewed and are negative  Historical Information   Past Medical History:   Diagnosis Date    Hyperlipidemia     Hypertension     Osteoporosis     Shingles      Past Surgical History:   Procedure Laterality Date    CATARACT EXTRACTION Bilateral     FOOT SURGERY Right     reconstruction    TOTAL HIP ARTHROPLASTY Bilateral      Social History   Social History     Substance and Sexual Activity   Alcohol Use Yes    Alcohol/week: 7 0 standard drinks    Types: 7 Glasses of wine per week     Social History     Substance and Sexual Activity   Drug Use Never     E-Cigarette/Vaping    E-Cigarette Use Never User      E-Cigarette/Vaping Substances    Nicotine No     THC No     CBD No     Flavoring No     Other No     Unknown No      Social History     Tobacco Use   Smoking Status Former Smoker   Smokeless Tobacco Never Used   Tobacco Comment    quit 50 years ago     Family History: non-contributory    Meds/Allergies   all current active meds have been reviewed  Allergies   Allergen Reactions    Aspirin     Warfarin Other (See Comments)       Objective   Vitals: Blood pressure 114/60, pulse 71, temperature 97 7 °F (36 5 °C), resp  rate 17, height 5' 2" (1 575 m), weight 58 1 kg (128 lb 1 4 oz), SpO2 96 %  ,Body mass index is 23 43 kg/m²  Intake/Output Summary (Last 24 hours) at 3/2/2022 0715  Last data filed at 3/2/2022 0500  Gross per 24 hour   Intake 900 ml   Output 1100 ml   Net -200 ml     I/O last 24 hours:   In: 900 [P O :900]  Out: 1100 [Urine:1100]    Invasive Devices  Report    Peripheral Intravenous Line            Peripheral IV 03/01/22 Left;Ventral (anterior) Forearm <1 day                Physical Exam  Ortho Exam    Vascular:   -DP pulse is weakly palpable B/L, PT pulse is palpable B/L   -Capillary refill time is less than 3 seconds B/L  -Pedal hair growth is diminished B/L  -Temperature is warm to cool from ankle to toes B/L -There is mild edema noted to the B/L LE    Neuro:  -Light touch and protective sensation is diminished    Ortho:  -Patient able to lift bilateral lower extremities  -Right foot HAV deformity   -Digital contractures noted to bilateral digits times 10  Derm:  -Left medial lower extremity wound measures approximately 14 x 9 2 x 0 2 cm  Moderate serosanguineous drainage  Full-thickness grade 2 ulceration  Decreased periwound erythema/calor noted  -Right medial lower extremity wound measures approximately 11 6 x 7 5 x 0 2 cm moderate serosanguineous drainage  No malodor  Decreased periwound erythema/calor noted  Clinical Images:                  Lab Results:   Results from last 7 days   Lab Units 03/02/22  0626   WBC Thousand/uL 9 55   HEMOGLOBIN g/dL 9 8*   HEMATOCRIT % 36 1   PLATELETS Thousands/uL 618*   NEUTROS PCT % 60   LYMPHS PCT % 10*   MONOS PCT % 24*   EOS PCT % 3       Imaging Studies: I have personally reviewed pertinent reports      Code Status: Level 1 - Full Code    Anish Aleman DPM

## 2022-03-02 NOTE — ASSESSMENT & PLAN NOTE
· /69 HR88 on admission  · Patient reports not taking any home antihypertensive medications (reports only taking home gabapentin and vitamin supplements currently)  · Monitor BP per unit protocol    Blood pressure 114/60, pulse 71, temperature 97 7 °F (36 5 °C), resp  rate 17, height 5' 2" (1 575 m), weight 58 1 kg (128 lb 1 4 oz), SpO2 96 %

## 2022-03-02 NOTE — PLAN OF CARE
Problem: Potential for Falls  Goal: Patient will remain free of falls    Description: INTERVENTIONS:  - Educate patient/family on patient safety including physical limitations  - Instruct patient to call for assistance with activity   - Consult OT/PT to assist with strengthening/mobility   - Keep Call bell within reach  - Keep bed low and locked with side rails adjusted as appropriate  - Keep care items and personal belongings within reach  - Initiate and maintain comfort rounds  - Make Fall Risk Sign visible to staff  - Offer Toileting every    Hours, in advance of need  - Initiate/Maintain   alarm  - Obtain necessary fall risk management equipment:     - Apply yellow socks and bracelet for high fall risk patients  - Consider moving patient to room near nurses station  Outcome: Progressing     Problem: MOBILITY - ADULT  Goal: Maintain or return to baseline ADL function  Description: INTERVENTIONS:  -  Assess patient's ability to carry out ADLs; assess patient's baseline for ADL function and identify physical deficits which impact ability to perform ADLs (bathing, care of mouth/teeth, toileting, grooming, dressing, etc )  - Assess/evaluate cause of self-care deficits   - Assess range of motion  - Assess patient's mobility; develop plan if impaired  - Assess patient's need for assistive devices and provide as appropriate  - Encourage maximum independence but intervene and supervise when necessary  - Involve family in performance of ADLs  - Assess for home care needs following discharge   - Consider OT consult to assist with ADL evaluation and planning for discharge  - Provide patient education as appropriate  Outcome: Progressing  Goal: Maintains/Returns to pre admission functional level  Description: INTERVENTIONS:  - Perform BMAT or MOVE assessment daily    - Set and communicate daily mobility goal to care team and patient/family/caregiver     - Collaborate with rehabilitation services on mobility goals if consulted  - Perform Range of Motion    times a day  - Reposition patient every    hours    - Dangle patient    times a day  - Stand patient    times a day  - Ambulate patient    times a day  - Out of bed to chair    times a day   - Out of bed for meals    times a day  - Out of bed for toileting  - Record patient progress and toleration of activity level   Outcome: Progressing     Problem: PAIN - ADULT  Goal: Verbalizes/displays adequate comfort level or baseline comfort level  Description: Interventions:  - Encourage patient to monitor pain and request assistance  - Assess pain using appropriate pain scale  - Administer analgesics based on type and severity of pain and evaluate response  - Implement non-pharmacological measures as appropriate and evaluate response  - Consider cultural and social influences on pain and pain management  - Notify physician/advanced practitioner if interventions unsuccessful or patient reports new pain  Outcome: Progressing     Problem: INFECTION - ADULT  Goal: Absence or prevention of progression during hospitalization  Description: INTERVENTIONS:  - Assess and monitor for signs and symptoms of infection  - Monitor lab/diagnostic results  - Monitor all insertion sites, i e  indwelling lines, tubes, and drains  - Monitor endotracheal if appropriate and nasal secretions for changes in amount and color  - [place] appropriate cooling/warming therapies per order  - Administer medications as ordered  - Instruct and encourage patient and family to use good hand hygiene technique  - Identify and instruct in appropriate isolation precautions for identified infection/condition  Outcome: Progressing  Goal: Absence of fever/infection during neutropenic period  Description: INTERVENTIONS:  - Monitor WBC    Outcome: Progressing     Problem: SAFETY ADULT  Goal: Patient will remain free of falls  Description: INTERVENTIONS:  - Educate patient/family on patient safety including physical limitations  - Instruct patient to call for assistance with activity   - Consult OT/PT to assist with strengthening/mobility   - Keep Call bell within reach  - Keep bed low and locked with side rails adjusted as appropriate  - Keep care items and personal belongings within reach  - Initiate and maintain comfort rounds  - Make Fall Risk Sign visible to staff  - Offer Toileting every    Hours, in advance of need  - Initiate/Maintain   alarm  - Obtain necessary fall risk management equipment:     - Apply yellow socks and bracelet for high fall risk patients  - Consider moving patient to room near nurses station  Outcome: Progressing  Goal: Maintain or return to baseline ADL function  Description: INTERVENTIONS:  -  Assess patient's ability to carry out ADLs; assess patient's baseline for ADL function and identify physical deficits which impact ability to perform ADLs (bathing, care of mouth/teeth, toileting, grooming, dressing, etc )  - Assess/evaluate cause of self-care deficits   - Assess range of motion  - Assess patient's mobility; develop plan if impaired  - Assess patient's need for assistive devices and provide as appropriate  - Encourage maximum independence but intervene and supervise when necessary  - Involve family in performance of ADLs  - Assess for home care needs following discharge   - Consider OT consult to assist with ADL evaluation and planning for discharge  - Provide patient education as appropriate  Outcome: Progressing  Goal: Maintains/Returns to pre admission functional level  Description: INTERVENTIONS:  - Perform BMAT or MOVE assessment daily    - Set and communicate daily mobility goal to care team and patient/family/caregiver  - Collaborate with rehabilitation services on mobility goals if consulted  - Perform Range of Motion    times a day  - Reposition patient every    hours    - Dangle patient    times a day  - Stand patient    times a day  - Ambulate patient    times a day  - Out of bed to chair    times a day   - Out of bed for meals times a day  - Out of bed for toileting  - Record patient progress and toleration of activity level   Outcome: Progressing     Problem: DISCHARGE PLANNING  Goal: Discharge to home or other facility with appropriate resources  Description: INTERVENTIONS:  - Identify barriers to discharge w/patient and caregiver  - Arrange for needed discharge resources and transportation as appropriate  - Identify discharge learning needs (meds, wound care, etc )  - Arrange for interpretive services to assist at discharge as needed  - Refer to Case Management Department for coordinating discharge planning if the patient needs post-hospital services based on physician/advanced practitioner order or complex needs related to functional status, cognitive ability, or social support system  Outcome: Progressing     Problem: Knowledge Deficit  Goal: Patient/family/caregiver demonstrates understanding of disease process, treatment plan, medications, and discharge instructions  Description: Complete learning assessment and assess knowledge base  Interventions:  - Provide teaching at level of understanding  - Provide teaching via preferred learning methods  Outcome: Progressing     Problem: Nutrition/Hydration-ADULT  Goal: Nutrient/Hydration intake appropriate for improving, restoring or maintaining nutritional needs  Description: Monitor and assess patient's nutrition/hydration status for malnutrition  Collaborate with interdisciplinary team and initiate plan and interventions as ordered  Monitor patient's weight and dietary intake as ordered or per policy  Utilize nutrition screening tool and intervene as necessary  Determine patient's food preferences and provide high-protein, high-caloric foods as appropriate       INTERVENTIONS:  - Monitor oral intake, urinary output, labs, and treatment plans  - Assess nutrition and hydration status and recommend course of action  - Evaluate amount of meals eaten  - Assist patient with eating if necessary   - Allow adequate time for meals  - Recommend/ encourage appropriate diets, oral nutritional supplements, and vitamin/mineral supplements  - Order, calculate, and assess calorie counts as needed  - Assess need for intravenous fluids  - Provide nutrition/hydration education as appropriate  - Include patient/family/caregiver in decisions related to nutrition  Outcome: Progressing     Problem: Prexisting or High Potential for Compromised Skin Integrity  Goal: Skin integrity is maintained or improved  Description: INTERVENTIONS:  - Identify patients at risk for skin breakdown  - Assess and monitor skin integrity  - Assess and monitor nutrition and hydration status  - Monitor labs   - Assess for incontinence   - Turn and reposition patient  - Assist with mobility/ambulation  - Relieve pressure over bony prominences  - Avoid friction and shearing  - Provide appropriate hygiene as needed including keeping skin clean and dry  - Evaluate need for skin moisturizer/barrier cream  - Collaborate with interdisciplinary team   - Patient/family teaching  - Consider wound care consult   Outcome: Progressing

## 2022-03-02 NOTE — CASE MANAGEMENT
Case Management Assessment & Discharge Planning Note    Patient name Harriet Olivares  Location /-43 MRN [id number]  : 1945 Date 3/2/2022       Current Admission Date: 2022  Current Admission Diagnosis:Wound cellulitis   Patient Active Problem List    Diagnosis Date Noted    Thrombocytosis 2022    Wound cellulitis 2021    Continuous opioid dependence ([facility] ) 2021    Severe malnutrition ([facility] ) 2021    Per[facility] disease [facility] 2021    Need for transfer to another facility 2021    Acute blood loss anemia 2021    Open wound of left lower leg 2021    Open wound of right lower leg 2021    Cellulitis of left foot 2021    Microcytic anemia 2021    Hammer toe 10/25/2019    Foot pain 10/25/2019    Encounter to establish care 10/25/2019    IFG (impaired fasting glucose) 2017    Senile osteoporosis 10/31/2017    Combined hyperlipidemia 2014    Essential (primary) hypertension 2014      LOS (days): 2  Geometric Mean LOS (GMLOS) (days): 3 20  Days to GMLOS:1 6     OBJECTIVE:    Risk of Unplanned Readmission Score: 14   Bundled Patient Payment:  (The pt is not a Bundle)     Current admission status: Inpatient       Preferred Pharmacy:   [address] [place] [address]  Phone: [phone] Fax: [phone]    Primary Care Provider: TELLY Bryant    Primary Insurance: [address] [address] [address] [facility] REP  Secondary Insurance:     ASSESSMENT:  [facility], [address] [place] [address]   Primary Phone: [phone] (Home)                    Obs Notice Signed:  (The Pt is not OBS)    Readmission Root Cause  30 Day Readmission: No    Patient Information  Admitted from[de-identified] Home  Mental Status: Alert  During Assessment patient was accompanied by: Not accompanied during assessment  Assessment information provided by[de-identified] Patient  Support Systems: Family members  [place] of Residence: [place] do you live in?: [address] entry access options   Select all that apply : Stairs  Number of steps to enter home : 4  Do the steps have railings?: Yes  Type of Current Residence: [facility]  In the last 12 months, was there a time when you were not able to pay the mortgage or rent on time?: No  In the last 12 months, how many places have you lived?: 1  In the last 12 months, was there a time when you did not have a steady place to sleep or slept in a shelter (including now)?: No  Homeless/housing insecurity resource given?: N/A  Living Arrangements: Other (Comment) (Lives with grand dtr and grand children)  Is patient a ?: No    Activities of Daily Living Prior to Admission  Functional Status: Independent  Completes ADLs independently?: Yes  Ambulates independently?: Yes  Does patient use assisted devices?: Yes  Assisted Devices (DME) used: Rollator  Does patient currently own DME?: Yes  What DME does the patient currently own?: Rollator  Does patient have a history of Outpatient Therapy (PT/OT)?: No  Does the patient have a history of Short-Term Rehab?: Yes ([facility])  Does patient have a history of [address]?: Yes ([facility])  Does patient currently have [address]?: No         Patient Information Continued  Income Source: SSI/SSD  Does patient have prescription coverage?: Yes  Within the past 12 months, you worried that your food would run out before you got the money to buy more : Never true  Within the past 12 months, the food you bought just didnt last and you didnt have money to get more : Never true  Food insecurity resource given?: N/A  Does patient receive dialysis treatments?: No  Does patient have a history of substance abuse?: No  Does patient have a history of Mental Health Diagnosis?: No    PHQ 2/9 Screening   Reviewed PHQ 2/9 Depression Screening Score?: Yes    Means of Transportation  Means of Transport to [facility][de-identified] Family transport  In the past 12 months, has lack of transportation kept you from medical appointments or from getting medications?: No  In the past 12 months, has lack of transportation kept you from meetings, work, or from getting things needed for daily living?: No  Was application for public transport provided?: N/A        DISCHARGE DETAILS:    Discharge planning discussed with[de-identified] pt  Freedom of Choice: Yes  Comments - Freedom of Choice:  The pt states that she would like nurse only assistance  CM contacted family/caregiver?: Yes  Were Treatment Team discharge recommendations reviewed with patient/caregiver?: Yes  Did patient/caregiver verbalize understanding of patient care needs?: Yes  Were patient/caregiver advised of the risks associated with not following Treatment Team discharge recommendations?: Yes    Contacts  Patient Contacts: Dima Nowak  Relationship to Patient[de-identified] Family  Contact Method: Phone  Phone Number: Merry President ([address]) [phone]  Reason/Outcome: Discharge [address]         Is the patient interested in [facility] at discharge?: Yes  [address] requested[de-identified] [address] Name[de-identified] Other  [facility] Provider[de-identified] PCP  [address] Needed[de-identified] Wound/Ostomy Care  Homebound Criteria Met[de-identified] Uses an Assist Device (i e  cane, walker, etc)  Supporting Clincal Findings[de-identified] Fatigues Easliy in Short Distances,Limited Endurance    DME Referral Provided  Referral made for DME?: No    Other Referral/Resources/Interventions Provided:  Interventions: [facility]  Referral Comments: Referrals pended for [facility] nursing only -pt does not want [facility] home care         Treatment Team Recommendation: Home with [address]  Discharge Destination Plan[de-identified] Home with [address]

## 2022-03-02 NOTE — CONSULTS
Vancomycin Assessment    Brain Jasson is a 67 y o  female who is currently receiving vancomycin 750 mg IV q12h for skin-soft tissue infection     Relevant clinical data and objective history reviewed:  Creatinine   Date Value Ref Range Status   03/02/2022 0 62 0 60 - 1 30 mg/dL Final     Comment:     Standardized to IDMS reference method   03/01/2022 0 66 0 60 - 1 30 mg/dL Final     Comment:     Standardized to IDMS reference method   02/28/2022 0 59 (L) 0 60 - 1 30 mg/dL Final     Comment:     Standardized to IDMS reference method     /60   Pulse 71   Temp 97 7 °F (36 5 °C)   Resp 17   Ht 5' 2" (1 575 m)   Wt 58 1 kg (128 lb 1 4 oz)   SpO2 96%   BMI 23 43 kg/m²   I/O last 3 completed shifts: In: 1728 3 [P O :1600; IV Piggyback:128 3]  Out: 1300 [Urine:1300]  Lab Results   Component Value Date/Time    BUN 11 03/02/2022 06:26 AM    WBC 9 55 03/02/2022 06:26 AM    HGB 9 8 (L) 03/02/2022 06:26 AM    HCT 36 1 03/02/2022 06:26 AM    MCV 72 (L) 03/02/2022 06:26 AM     (H) 03/02/2022 06:26 AM     Temp Readings from Last 3 Encounters:   03/02/22 97 7 °F (36 5 °C)   11/11/21 (!) 96 9 °F (36 1 °C) (Tympanic)   11/05/21 (!) 97 3 °F (36 3 °C)     Vancomycin Days of Therapy: 3    Assessment/Plan  The patient is currently on vancomycin utilizing scheduled dosing  Baseline risks associated with therapy include: concomitant nephrotoxic medications, advanced age, and dehydration  The patient is receiving 750 mg IV q12h with the most recent vancomycin level being at steady-state and therapeutic (18 2) based on a goal of 15-20 (appropriate for most indications) ; therefore, is clinically appropriate and dose will be continued   Pharmacy will continue to follow closely for s/sx of nephrotoxicity, infusion reactions, and appropriateness of therapy  Pharmacy will continue to follow the patients culture results and clinical progress daily      Moses Sánchez, Pharmacist

## 2022-03-02 NOTE — ASSESSMENT & PLAN NOTE
· Patient appears to have chronic elevation with possible acute increase reactive in the setting of acute infection  · Trend CBC periodically

## 2022-03-02 NOTE — ASSESSMENT & PLAN NOTE
Patient with history of chronic bilateral lower extremity wounds, with history of MRSA infection, was seen by outpatient wound care today who had concerns for bilateral lower extremity cellulitis  Patient reports having chronic large ulcers on each lower extremity, and reports increasing redness in both over the past 24 hours; with increased drainage and pain from the left wound  Denies fever, chill, recent sick contact, headache, visual disturbance, dizzy/lightheadedness, chest pain, palpitations, SOB, ABD pain, N/V/D, new numbness/tingling/weakness, or other symptom  /60   Pulse 71   Temp 97 7 °F (36 5 °C)   Resp 17   Ht 5' 2" (1 575 m)   Wt 58 1 kg (128 lb 1 4 oz)   SpO2 96%   BMI 23 43 kg/m²     · Has history of recurrent BL LE cellulitis w/MRSA infection (last d/c on 11/2021)  · Patient reports increasing redness in BL LE's over past 24h with increased drainage and pain in the left wound  · Seen by outpatient wound care today who had concerns of infection and recommended ED visit  · BL lower extremity large, wet, medial ulcers with surrounding erythema and warmth, without underlying fluctuance felt  · Afebrile;WBC 13 03; ANC 9 27  · XR R-foot performed in ED awaiting official read   · ED gave rocephin + vanco   · Procal and wound cultures ordered   · Continue rocephin + vanco overnight, trend WBC and f/u with pending infectious labs, localized wound care  · Wound care nursing consulted, consider ID consult if patient is not improving  However patient is improving quite well    03/02/2022-patient was seen at bedside today  Wound cultures are pending  WBC count is downtrending  MRSA culture is pending  Podiatry consult input appreciated  Arterial duplex scan reviewed from Care everywhere which seem to be unremarkable  Continue antibiotics with ceftriaxone and vancomycin for now  Patient's vital signs are stable    Discharge plan on 03/03/2022 depending on the culture results

## 2022-03-02 NOTE — ASSESSMENT & PLAN NOTE
· /69 HR88 on admission  · Patient reports not taking any home antihypertensive medications   · Monitor BP per unit protocol  · Remains stable

## 2022-03-02 NOTE — PROGRESS NOTES
[address] [facility]  Progress Note - Can Cord 1945, 67 y o  female MRN: [id number]  Unit/Bed#: -Alejandro Encounter: [id number]  Primary Care Provider: TELLY Workman   Date and time admitted to hospital: 2/28/2022  5:10 PM    * Wound cellulitis  Assessment & Plan  Patient with history of chronic bilateral lower extremity wounds, with history of MRSA infection, was seen by outpatient wound care today who had concerns for bilateral lower extremity cellulitis  Patient reports having chronic large ulcers on each lower extremity, and reports increasing redness in both over the past 24 hours; with increased drainage and pain from the left wound  Denies fever, chill, recent sick contact, headache, visual disturbance, dizzy/lightheadedness, chest pain, palpitations, SOB, ABD pain, N/V/D, new numbness/tingling/weakness, or other symptom  /60   Pulse 71   Temp 97 7 °F (36 5 °C)   Resp 17   Ht 5' 2" (1 575 m)   Wt 58 1 kg (128 lb 1 4 oz)   SpO2 96%   BMI 23 43 kg/m²     · Has history of recurrent BL LE cellulitis w/MRSA infection (last d/c on 11/2021)  · Patient reports increasing redness in BL LE's over past 24h with increased drainage and pain in the left wound  · Seen by outpatient wound care today who had concerns of infection and recommended ED visit  · BL lower extremity large, wet, medial ulcers with surrounding erythema and warmth, without underlying fluctuance felt  · Afebrile;WBC 13 03; ANC 9 27  · XR R-foot performed in ED awaiting official read   · ED gave rocephin + vanco   · Procal and wound cultures ordered   · Continue rocephin + vanco overnight, trend WBC and f/u with pending infectious labs, localized wound care  · Wound care nursing consulted, consider ID consult if patient is not improving  However patient is improving quite well    03/02/2022-patient was seen at bedside today  Wound cultures are pending  WBC count is downtrending  MRSA culture is pending  Podiatry consult input appreciated  Arterial duplex scan reviewed from Care everywhere which seem to be unremarkable  Continue antibiotics with ceftriaxone and vancomycin for now  Patient's vital signs are stable  Discharge plan on 03/03/2022 depending on the culture results     Thrombocytosis  Assessment & Plan  · Platelets 935  · Patient appears to have chronic elevation with possible acute increase reactive in the setting of acute infection  · Trend CBC periodically    Essential (primary) hypertension  Assessment & Plan  · /69 HR88 on admission  · Patient reports not taking any home antihypertensive medications (reports only taking home gabapentin and vitamin supplements currently)  · Monitor BP per unit protocol    Blood pressure 114/60, pulse 71, temperature 97 7 °F (36 5 °C), resp  rate 17, height 5' 2" (1 575 m), weight 58 1 kg (128 lb 1 4 oz), SpO2 96 %  Combined hyperlipidemia  Assessment & Plan  · Currently denies taking any statin medications  · Diet control to continue      TE Pharmacologic Prophylaxis: Enoxaparin (Lovenox)    Patient Centered Rounds: I have performed bedside rounds with nursing staff today  Discussions with Specialists or Other Care Team Provider:  Care team  Education and Discussions with Family / Patient:  Patient    Current Length of Stay: 2 day(s)  Current Patient Status: Inpatient     Certification Statement: The patient will continue to require additional inpatient hospital stay due to Wound cellulitis  Discharge Plan / Estimated Discharge Date:  1-2 days    Code Status: Level 1 - Full Code  ______________________________________________________________________________    Subjective:   Patient seen and examined by me  No chest pain, palpitations or diaphoresis  Patient does not have any high fever  States that the tingling in the legs is much better today  No foul-smelling discharge from the wounds      Objective:   Vitals: Blood pressure 114/60, pulse 71, temperature 97 7 °F (36 5 °C), resp  rate 17, height 5' 2" (1 575 m), weight 58 1 kg (128 lb 1 4 oz), SpO2 96 %  Physical Exam:   General appearance: alert, appears stated age and cooperative  Constitutional- looks a little weak  HEENT - atraumatic and normocephalic  Neck- supple  Skin - no fresh rash  Extremities no fresh focal deformities except for bilateral lower extremity wounds  Cardiovascular- S1-S2 heard  Respiratory- bilateral air entry present, no crackles or rhonchi  Skin - no fresh rash  Abdomen - normal bowel sounds present, no rebound tenderness  CNS- No fresh focal deficits  Psych- no acute psychosis     Additional Data:   Labs:  Results from last 7 days   Lab Units 03/02/22  0626 03/01/22  0433 02/28/22  1800   WBC Thousand/uL 9 55 11 03* 13 03*   HEMOGLOBIN g/dL 9 8* 9 2* 11 1*   HEMATOCRIT % 36 1 34 3* 40 9   MCV fL 72* 76* 72*   PLATELETS Thousands/uL 618* 654* 786*     Results from last 7 days   Lab Units 03/02/22  0626 03/01/22  0433 02/28/22  1800   SODIUM mmol/L 139 139 139   POTASSIUM mmol/L 4 4 4 2 4 0   CHLORIDE mmol/L 105 107 106   CO2 mmol/L 26 27 23   ANION GAP mmol/L 8 5 10   BUN mg/dL 11 14 14   CREATININE mg/dL 0 62 0 66 0 59*   CALCIUM mg/dL 8 8 8 1* 7 7*   ALBUMIN g/dL  --   --  2 6*   TOTAL BILIRUBIN mg/dL  --   --  0 50   ALK PHOS U/L  --   --  113   ALT U/L  --   --  15   AST U/L  --   --  30   EGFR ml/min/1 73sq m 87 85 88   GLUCOSE RANDOM mg/dL 97 94 72                  Results from last 7 days   Lab Units 03/01/22  0433 02/28/22  2245 02/28/22  1800   LACTIC ACID mmol/L  --   --  1 2   PROCALCITONIN ng/ml 0 16   < >  --     < > = values in this interval not displayed  * I Have Reviewed All Lab Data Listed Above  Cultures:   Results from last 7 days   Lab Units 03/01/22  1443 02/28/22  1800   BLOOD CULTURE   --  No Growth at 24 hrs  No Growth at 24 hrs     GRAM STAIN RESULT  Rare Gram negative rods*  No polys seen*  2+ Gram negative rods*  2+ Gram positive cocci in pairs*  No polys seen*  --              Imaging:  Imaging Reports Reviewed Today Include:   XR foot 3+ views RIGHT    Result Date: 3/1/2022  Impression: No acute osseous abnormality  Post surgical changes at the 1st metatarsophalangeal joint  Additional chronic appearing findings as noted above  Workstation performed: [id number]     Scheduled Meds:  Current Facility-Administered Medications   Medication Dose Route Frequency Provider Last Rate    acetaminophen  650 mg Oral Q6H PRN Stephanie Grace PA-C      calcium carbonate  1 tablet Oral Daily With Breakfast [place]      cefTRIAXone  1,000 mg Intravenous Q24H Stephanie Grace PA-C 1,000 mg (03/01/22 1709)    cyanocobalamin  1,000 mcg Oral Daily Balwinder [place] PAAv      enoxaparin  40 mg Subcutaneous Daily Stephanie [facility] [place]      gabapentin  600 mg Oral HS [place], [place]      morphine injection  2 mg Intravenous Q4H PRN [place], [place]      oxyCODONE  2 5 mg Oral Q4H PRN [place]      oxyCODONE  5 mg Oral Q4H PRN [place]      vancomycin  12 5 mg/kg Intravenous Q12H [place], [place] 750 mg (03/02/22 0834)       Christiano Hager MD  [facility]    ** Please Note: This note has been constructed using a voice recognition system   **

## 2022-03-03 VITALS
SYSTOLIC BLOOD PRESSURE: 152 MMHG | HEART RATE: 72 BPM | DIASTOLIC BLOOD PRESSURE: 88 MMHG | WEIGHT: 128.09 LBS | BODY MASS INDEX: 23.57 KG/M2 | TEMPERATURE: 98 F | RESPIRATION RATE: 19 BRPM | HEIGHT: 62 IN | OXYGEN SATURATION: 98 %

## 2022-03-03 LAB
ANION GAP SERPL CALCULATED.3IONS-SCNC: 8 MMOL/L (ref 4–13)
BUN SERPL-MCNC: 15 MG/DL (ref 5–25)
CALCIUM SERPL-MCNC: 8.8 MG/DL (ref 8.3–10.1)
CHLORIDE SERPL-SCNC: 104 MMOL/L (ref 100–108)
CO2 SERPL-SCNC: 27 MMOL/L (ref 21–32)
CREAT SERPL-MCNC: 0.62 MG/DL (ref 0.6–1.3)
GFR SERPL CREATININE-BSD FRML MDRD: 87 ML/MIN/1.73SQ M
GLUCOSE SERPL-MCNC: 93 MG/DL (ref 65–140)
MRSA NOSE QL CULT: NORMAL
POTASSIUM SERPL-SCNC: 4.7 MMOL/L (ref 3.5–5.3)
SODIUM SERPL-SCNC: 139 MMOL/L (ref 136–145)
VANCOMYCIN TROUGH SERPL-MCNC: 22.3 UG/ML (ref 10–20)

## 2022-03-03 PROCEDURE — 80048 BASIC METABOLIC PNL TOTAL CA: CPT | Performed by: INTERNAL MEDICINE

## 2022-03-03 PROCEDURE — 99232 SBSQ HOSP IP/OBS MODERATE 35: CPT | Performed by: INTERNAL MEDICINE

## 2022-03-03 PROCEDURE — 80202 ASSAY OF VANCOMYCIN: CPT | Performed by: INTERNAL MEDICINE

## 2022-03-03 RX ADMIN — VANCOMYCIN HYDROCHLORIDE 1500 MG: 5 INJECTION, POWDER, LYOPHILIZED, FOR SOLUTION INTRAVENOUS at 21:56

## 2022-03-03 RX ADMIN — GABAPENTIN 600 MG: 300 CAPSULE ORAL at 21:57

## 2022-03-03 RX ADMIN — CEFTRIAXONE SODIUM 1000 MG: 10 INJECTION, POWDER, FOR SOLUTION INTRAVENOUS at 17:26

## 2022-03-03 RX ADMIN — ACETAMINOPHEN 650 MG: 325 TABLET, FILM COATED ORAL at 16:41

## 2022-03-03 RX ADMIN — CALCIUM 1 TABLET: 500 TABLET ORAL at 08:50

## 2022-03-03 RX ADMIN — CYANOCOBALAMIN TAB 500 MCG 1000 MCG: 500 TAB at 08:50

## 2022-03-03 RX ADMIN — ENOXAPARIN SODIUM 40 MG: 40 INJECTION SUBCUTANEOUS at 08:53

## 2022-03-03 NOTE — PROGRESS NOTES
Vancomycin Assessment    Lynda Goyal is a 67 y o  female who is currently receiving vancomycin 750 mg IV q12h for skin-soft tissue infection     Relevant clinical data and objective history reviewed:  Creatinine   Date Value Ref Range Status   03/03/2022 0 62 0 60 - 1 30 mg/dL Final     Comment:     Standardized to IDMS reference method   03/02/2022 0 62 0 60 - 1 30 mg/dL Final     Comment:     Standardized to IDMS reference method   03/01/2022 0 66 0 60 - 1 30 mg/dL Final     Comment:     Standardized to IDMS reference method     /76   Pulse 68   Temp 98 °F (36 7 °C)   Resp 18   Ht 5' 2" (1 575 m)   Wt 58 1 kg (128 lb 1 4 oz)   SpO2 94%   BMI 23 43 kg/m²   I/O last 3 completed shifts: In: 1180 [P O :1180]  Out: 1600 [Urine:1600]  Lab Results   Component Value Date/Time    BUN 15 03/03/2022 04:33 AM    WBC 9 55 03/02/2022 06:26 AM    HGB 9 8 (L) 03/02/2022 06:26 AM    HCT 36 1 03/02/2022 06:26 AM    MCV 72 (L) 03/02/2022 06:26 AM     (H) 03/02/2022 06:26 AM     Temp Readings from Last 3 Encounters:   03/03/22 98 °F (36 7 °C)   11/11/21 (!) 96 9 °F (36 1 °C) (Tympanic)   11/05/21 (!) 97 3 °F (36 3 °C)     Vancomycin Days of Therapy: 4    Assessment/Plan  The patient is currently on vancomycin utilizing scheduled dosing  Baseline risks associated with therapy include: concomitant nephrotoxic medications and advanced age  The patient is receiving vancomycin 750 mg IV q12h with the most recent vancomycin level being at steady-state and supratherapeutic based on a goal of 15-20 (appropriate for most indications); therefore, after clinical evaluation will be changed to vancomycin 1500 mg IV q24h  Pharmacy will continue to follow closely for s/sx of nephrotoxicity, infusion reactions, and appropriateness of therapy  BMP and CBC will be ordered per protocol  Plan for trough as patient approaches steady state, prior to the 4th dose at approximately 3/6/22 at 2130   Pharmacy will continue to follow the patients culture results and clinical progress daily      Socorro Palumbo, Pharmacist

## 2022-03-03 NOTE — PLAN OF CARE
Problem: PAIN - ADULT  Goal: Verbalizes/displays adequate comfort level or baseline comfort level  Description: Interventions:  - Encourage patient to monitor pain and request assistance  - Assess pain using appropriate pain scale  - Administer analgesics based on type and severity of pain and evaluate response  - Implement non-pharmacological measures as appropriate and evaluate response  - Consider cultural and social influences on pain and pain management  - Notify physician/advanced practitioner if interventions unsuccessful or patient reports new pain  Outcome: Progressing     Problem: INFECTION - ADULT  Goal: Absence or prevention of progression during hospitalization  Description: INTERVENTIONS:  - Assess and monitor for signs and symptoms of infection  - Monitor lab/diagnostic results  - Monitor all insertion sites, i e  indwelling lines, tubes, and drains  - Monitor endotracheal if appropriate and nasal secretions for changes in amount and color  - [place] appropriate cooling/warming therapies per order  - Administer medications as ordered  - Instruct and encourage patient and family to use good hand hygiene technique  - Identify and instruct in appropriate isolation precautions for identified infection/condition  Outcome: Progressing     Problem: Knowledge Deficit  Goal: Patient/family/caregiver demonstrates understanding of disease process, treatment plan, medications, and discharge instructions  Description: Complete learning assessment and assess knowledge base    Interventions:  - Provide teaching at level of understanding  - Provide teaching via preferred learning methods  Outcome: Progressing

## 2022-03-03 NOTE — PLAN OF CARE
Problem: Potential for Falls  Goal: Patient will remain free of falls  Description: INTERVENTIONS:  - Educate patient/family on patient safety including physical limitations  - Instruct patient to call for assistance with activity   - Consult OT/PT to assist with strengthening/mobility   - Keep Call bell within reach  - Keep bed low and locked with side rails adjusted as appropriate  - Keep care items and personal belongings within reach  - Initiate and maintain comfort rounds  - Make Fall Risk Sign visible to staff  - Offer Toileting every 2 Hours, in advance of need  - Initiate/Maintain Bed alarm  - Obtain necessary fall risk management equipment: Bed  - Apply yellow socks and bracelet for high fall risk patients  - Consider moving patient to room near nurses station  3/2/2022 2237 by Aditya Monroe RN  Outcome: Progressing  3/2/2022 2234 by Aditya Monroe RN  Outcome: Progressing     Problem: MOBILITY - ADULT  Goal: Maintain or return to baseline ADL function  Description: INTERVENTIONS:  -  Assess patient's ability to carry out ADLs; assess patient's baseline for ADL function and identify physical deficits which impact ability to perform ADLs (bathing, care of mouth/teeth, toileting, grooming, dressing, etc )  - Assess/evaluate cause of self-care deficits   - Assess range of motion  - Assess patient's mobility; develop plan if impaired  - Assess patient's need for assistive devices and provide as appropriate  - Encourage maximum independence but intervene and supervise when necessary  - Involve family in performance of ADLs  - Assess for home care needs following discharge   - Consider OT consult to assist with ADL evaluation and planning for discharge  - Provide patient education as appropriate  3/2/2022 2237 by Aditya Monroe RN  Outcome: Progressing  3/2/2022 2234 by Aditya Monroe RN  Outcome: Progressing  Goal: Maintains/Returns to pre admission functional level  Description: INTERVENTIONS:  - Perform BMAT or MOVE assessment daily    - Set and communicate daily mobility goal to care team and patient/family/caregiver  - Collaborate with rehabilitation services on mobility goals if consulted  - Perform Range of Motion 4 times a day  - Reposition patient every 2 hours    - Dangle patient 4 times a day  - Stand patient 4 times a day  - Ambulate patient 3 times a day  - Out of bed to chair 3 times a day   - Out of bed for meals 3 times a day  - Out of bed for toileting  - Record patient progress and toleration of activity level   3/2/2022 2237 by Torey Hanson RN  Outcome: Progressing  3/2/2022 2234 by Torey Hanson RN  Outcome: Progressing     Problem: PAIN - ADULT  Goal: Verbalizes/displays adequate comfort level or baseline comfort level  Description: Interventions:  - Encourage patient to monitor pain and request assistance  - Assess pain using appropriate pain scale  - Administer analgesics based on type and severity of pain and evaluate response  - Implement non-pharmacological measures as appropriate and evaluate response  - Consider cultural and social influences on pain and pain management  - Notify physician/advanced practitioner if interventions unsuccessful or patient reports new pain  3/2/2022 2237 by Torey Hanson RN  Outcome: Progressing  3/2/2022 2234 by Torey Hanson RN  Outcome: Progressing     Problem: INFECTION - ADULT  Goal: Absence or prevention of progression during hospitalization  Description: INTERVENTIONS:  - Assess and monitor for signs and symptoms of infection  - Monitor lab/diagnostic results  - Monitor all insertion sites, i e  indwelling lines, tubes, and drains  - Monitor endotracheal if appropriate and nasal secretions for changes in amount and color  - [place] appropriate cooling/warming therapies per order  - Administer medications as ordered  - Instruct and encourage patient and family to use good hand hygiene technique  - Identify and instruct in appropriate isolation precautions for identified infection/condition  3/2/2022 2237 by Alessandro Ramirez RN  Outcome: Progressing  3/2/2022 2234 by Alessandro Ramirez RN  Outcome: Progressing  Goal: Absence of fever/infection during neutropenic period  Description: INTERVENTIONS:  - Monitor WBC    3/2/2022 2237 by Alessandro Ramirez RN  Outcome: Progressing  3/2/2022 2234 by Alessandro Ramirez RN  Outcome: Progressing     Problem: SAFETY ADULT  Goal: Patient will remain free of falls  Description: INTERVENTIONS:  - Educate patient/family on patient safety including physical limitations  - Instruct patient to call for assistance with activity   - Consult OT/PT to assist with strengthening/mobility   - Keep Call bell within reach  - Keep bed low and locked with side rails adjusted as appropriate  - Keep care items and personal belongings within reach  - Initiate and maintain comfort rounds  - Make Fall Risk Sign visible to staff  - Offer Toileting every 2 Hours, in advance of need  - Initiate/Maintain Bed alarm  - Obtain necessary fall risk management equipment: Bed Alarm  - Apply yellow socks and bracelet for high fall risk patients  - Consider moving patient to room near nurses station  3/2/2022 2237 by Alessandro Ramirez RN  Outcome: Progressing  3/2/2022 2234 by Alessandro Ramirez RN  Outcome: Progressing  Goal: Maintain or return to baseline ADL function  Description: INTERVENTIONS:  -  Assess patient's ability to carry out ADLs; assess patient's baseline for ADL function and identify physical deficits which impact ability to perform ADLs (bathing, care of mouth/teeth, toileting, grooming, dressing, etc )  - Assess/evaluate cause of self-care deficits   - Assess range of motion  - Assess patient's mobility; develop plan if impaired  - Assess patient's need for assistive devices and provide as appropriate  - Encourage maximum independence but intervene and supervise when necessary  - Involve family in performance of ADLs  - Assess for home care needs following discharge   - Consider OT consult to assist with ADL evaluation and planning for discharge  - Provide patient education as appropriate  3/2/2022 2237 by Kadie Wen RN  Outcome: Progressing  3/2/2022 2234 by Kadie Wen RN  Outcome: Progressing  Goal: Maintains/Returns to pre admission functional level  Description: INTERVENTIONS:  - Perform BMAT or MOVE assessment daily    - Set and communicate daily mobility goal to care team and patient/family/caregiver  - Collaborate with rehabilitation services on mobility goals if consulted  - Perform Range of Motion 3 times a day  - Reposition patient every 2 hours    - Dangle patient 4 times a day  - Stand patient 4 times a day  - Ambulate patient 3 times a day  - Out of bed to chair 3 times a day   - Out of bed for meals 3 times a day  - Out of bed for toileting  - Record patient progress and toleration of activity level   3/2/2022 2237 by Kadie Wen RN  Outcome: Progressing  3/2/2022 2234 by Kadie Wen RN  Outcome: Progressing     Problem: DISCHARGE PLANNING  Goal: Discharge to home or other facility with appropriate resources  Description: INTERVENTIONS:  - Identify barriers to discharge w/patient and caregiver  - Arrange for needed discharge resources and transportation as appropriate  - Identify discharge learning needs (meds, wound care, etc )  - Arrange for interpretive services to assist at discharge as needed  - Refer to Case Management Department for coordinating discharge planning if the patient needs post-hospital services based on physician/advanced practitioner order or complex needs related to functional status, cognitive ability, or social support system  3/2/2022 2237 by Kadie Wen RN  Outcome: Progressing  3/2/2022 2234 by Kadie Wen RN  Outcome: Progressing     Problem: Knowledge Deficit  Goal: Patient/family/caregiver demonstrates understanding of disease process, treatment plan, medications, and discharge instructions  Description: Complete learning assessment and assess knowledge base  Interventions:  - Provide teaching at level of understanding  - Provide teaching via preferred learning methods  3/2/2022 2237 by Cordelia Murray RN  Outcome: Progressing  3/2/2022 2234 by Cordelia Murray RN  Outcome: Progressing     Problem: Nutrition/Hydration-ADULT  Goal: Nutrient/Hydration intake appropriate for improving, restoring or maintaining nutritional needs  Description: Monitor and assess patient's nutrition/hydration status for malnutrition  Collaborate with interdisciplinary team and initiate plan and interventions as ordered  Monitor patient's weight and dietary intake as ordered or per policy  Utilize nutrition screening tool and intervene as necessary  Determine patient's food preferences and provide high-protein, high-caloric foods as appropriate       INTERVENTIONS:  - Monitor oral intake, urinary output, labs, and treatment plans  - Assess nutrition and hydration status and recommend course of action  - Evaluate amount of meals eaten  - Assist patient with eating if necessary   - Allow adequate time for meals  - Recommend/ encourage appropriate diets, oral nutritional supplements, and vitamin/mineral supplements  - Order, calculate, and assess calorie counts as needed  - Assess need for intravenous fluids  - Provide nutrition/hydration education as appropriate  - Include patient/family/caregiver in decisions related to nutrition  3/2/2022 2237 by Cordelia Murray RN  Outcome: Progressing  3/2/2022 2234 by Cordelia Murray RN  Outcome: Progressing     Problem: Prexisting or High Potential for Compromised Skin Integrity  Goal: Skin integrity is maintained or improved  Description: INTERVENTIONS:  - Identify patients at risk for skin breakdown  - Assess and monitor skin integrity  - Assess and monitor nutrition and hydration status  - Monitor labs   - Assess for incontinence   - Turn and reposition patient  - Assist with mobility/ambulation  - Relieve pressure over bony prominences  - Avoid friction and shearing  - Provide appropriate hygiene as needed including keeping skin clean and dry  - Evaluate need for skin moisturizer/barrier cream  - Collaborate with interdisciplinary team   - Patient/family teaching  - Consider wound care consult   3/2/2022 2237 by Jones Pablo RN  Outcome: Progressing  3/2/2022 2234 by Jones Pablo RN  Outcome: Progressing

## 2022-03-03 NOTE — PROGRESS NOTES
[address] [facility]  Progress Note - Monica Samson 1945, 67 y o  female MRN: [id number]  Unit/Bed#: -Alejandro Encounter: [id number]  Primary Care Provider: TELLY José   Date and time admitted to hospital: 2/28/2022  5:10 PM    * Wound cellulitis  Assessment & Plan  Patient with history of chronic bilateral lower extremity wounds, with history of MRSA infection, was seen by outpatient wound care today who had concerns for bilateral lower extremity cellulitis  Patient reports having chronic large ulcers on each lower extremity, and reports increasing redness in both over the past 24 hours; with increased drainage and pain from the left wound  Denies fever, chill, recent sick contact, headache, visual disturbance, dizzy/lightheadedness, chest pain, palpitations, SOB, ABD pain, N/V/D, new numbness/tingling/weakness, or other symptom  /60   Pulse 71   Temp 97 7 °F (36 5 °C)   Resp 17   Ht 5' 2" (1 575 m)   Wt 58 1 kg (128 lb 1 4 oz)   SpO2 96%   BMI 23 43 kg/m²     · Has history of recurrent BL LE cellulitis w/MRSA infection (last d/c on 11/2021)  · Patient reports increasing redness in BL LE's over past 24h with increased drainage and pain in the left wound  · Seen by outpatient wound care today who had concerns of infection and recommended ED visit  · BL lower extremity large, wet, medial ulcers with surrounding erythema and warmth, without underlying fluctuance felt  · Afebrile;WBC 13 03; ANC 9 27  · XR R-foot performed in ED awaiting official read   · ED gave rocephin + vanco   · Procal and wound cultures ordered   · Continue rocephin + vanco f/u wound cx; preliminary revealing staph and grp G strep   F/u sensitivities  · Wound care consult  · Status post arterial duplex    Thrombocytosis  Assessment & Plan  · Patient appears to have chronic elevation with possible acute increase reactive in the setting of acute infection  · Trend CBC periodically    Essential (primary) hypertension  Assessment & Plan  · /69 HR88 on admission  · Patient reports not taking any home antihypertensive medications   · Monitor BP per unit protocol  · Remains stable      Combined hyperlipidemia  Assessment & Plan  · Currently denies taking any statin medications  · Diet control to continue      VTE Pharmacologic Prophylaxis:   Pharmacologic: Enoxaparin (Lovenox)  Mechanical VTE Prophylaxis in Place: No    Patient Centered Rounds: I have performed bedside rounds with nursing staff today  Discussions with Specialists or Other Care Team Provider:    Education and Discussions with Family / Patient:  Patient    Time Spent for Care: 30 minutes  More than 50% of total time spent on counseling and coordination of care as described above  Current Length of Stay: 3 day(s)    Current Patient Status: Inpatient   Certification Statement: The patient will continue to require additional inpatient hospital stay due to Pending wound culture    Discharge Plan:  DC planning tomorrow    Code Status: Level 1 - Full Code      Subjective:   No acute events overnight  Patient is afebrile, nontoxic appearing  Objective:     Vitals:   Temp (24hrs), Av 2 °F (36 8 °C), Min:98 °F (36 7 °C), Max:98 4 °F (36 9 °C)    Temp:  [98 °F (36 7 °C)-98 4 °F (36 9 °C)] 98 °F (36 7 °C)  HR:  [68-83] 83  Resp:  [18-20] 18  BP: (110-122)/(62-76) 110/62  SpO2:  [94 %-97 %] 97 %  Body mass index is 23 43 kg/m²  Input and Output Summary (last 24 hours): Intake/Output Summary (Last 24 hours) at 3/3/2022 1753  Last data filed at 3/3/2022 1401  Gross per 24 hour   Intake 460 ml   Output 1600 ml   Net -1140 ml       Physical Exam:     Physical Exam  Cardiovascular:      Rate and Rhythm: Normal rate and regular rhythm  Pulses: Normal pulses  Heart sounds: Normal heart sounds  Pulmonary:      Effort: Pulmonary effort is normal  No respiratory distress  Breath sounds: Normal breath sounds  No wheezing or rales  Abdominal:      General: Abdomen is flat  Bowel sounds are normal  There is no distension  Palpations: Abdomen is soft  Tenderness: There is no abdominal tenderness  Musculoskeletal:         General: Normal range of motion  Cervical back: Normal range of motion and neck supple  Right lower leg: No edema  Left lower leg: No edema  Skin:     General: Skin is warm and dry  Comments: Lower extremity dressing in place   Neurological:      General: No focal deficit present  Mental Status: She is alert and oriented to person, place, and time  Mental status is at baseline  Cranial Nerves: No cranial nerve deficit  Motor: No weakness  Additional Data:     Labs:    Results from last 7 days   Lab Units 03/02/22  0626   WBC Thousand/uL 9 55   HEMOGLOBIN g/dL 9 8*   HEMATOCRIT % 36 1   PLATELETS Thousands/uL 618*   NEUTROS PCT % 60   LYMPHS PCT % 10*   MONOS PCT % 24*   EOS PCT % 3     Results from last 7 days   Lab Units 03/03/22  0433 03/01/22  0433 02/28/22  1800   SODIUM mmol/L 139   < > 139   POTASSIUM mmol/L 4 7   < > 4 0   CHLORIDE mmol/L 104   < > 106   CO2 mmol/L 27   < > 23   BUN mg/dL 15   < > 14   CREATININE mg/dL 0 62   < > 0 59*   ANION GAP mmol/L 8   < > 10   CALCIUM mg/dL 8 8   < > 7 7*   ALBUMIN g/dL  --   --  2 6*   TOTAL BILIRUBIN mg/dL  --   --  0 50   ALK PHOS U/L  --   --  113   ALT U/L  --   --  15   AST U/L  --   --  30   GLUCOSE RANDOM mg/dL 93   < > 72    < > = values in this interval not displayed  Results from last 7 days   Lab Units 03/01/22  0433 02/28/22  2245 02/28/22  1800   LACTIC ACID mmol/L  --   --  1 2   PROCALCITONIN ng/ml 0 16 0 15  --            * I Have Reviewed All Lab Data Listed Above  * Additional Pertinent Lab Tests Reviewed:  All Labs Within Last 24 Hours Reviewed    Imaging:    Imaging Reports Reviewed Today Include:   Imaging Personally Reviewed by Myself Includes:      Recent Cultures (last 7 days): Results from last 7 days   Lab Units 03/01/22  1443 02/28/22  1800   BLOOD CULTURE   --  No Growth at 48 hrs  No Growth at 48 hrs  GRAM STAIN RESULT  2+ Gram negative rods*  2+ Gram positive cocci in pairs*  No polys seen*  Rare Gram negative rods*  No polys seen*  --    WOUND CULTURE  4+ Growth of Beta Hemolytic Streptococcus Group G*  3+ Growth of Staphylococcus aureus*  2+ Growth of Staphylococcus aureus*  --        Last 24 Hours Medication List:   Current Facility-Administered Medications   Medication Dose Route Frequency Provider Last Rate    acetaminophen  650 mg Oral Q6H PRN Mag Scott PA-C      calcium carbonate  1 tablet Oral Daily With Breakfast [facility] LISA [place]      cefTRIAXone  1,000 mg Intravenous Q24H [facility] 1,000 [facility] (03/03/22 1726)    cyanocobalamin  1,000 mcg Oral Daily [facility]      enoxaparin  40 mg Subcutaneous Daily [place], [place]      gabapentin  600 mg Oral HS [place], [place]      morphine injection  2 mg Intravenous Q4H PRN [place], [place]      oxyCODONE  2 5 mg Oral Q4H PRN [place], [place]      oxyCODONE  5 mg Oral Q4H PRN [place], [place]      vancomycin  1,500 mg Intravenous Q24H Jasmyn Tse DO          Today, Patient Was Seen By: Jasmyn Tse DO    ** Please Note: Dictation voice to text software may have been used in the creation of this document   **

## 2022-03-03 NOTE — PROGRESS NOTES
Vancomycin IV Pharmacy-to-Dose Consultation    Trini Obregon is a 67 y o  female who is currently receiving Vancomycin IV with management by the Pharmacy Consult service  Assessment/Plan:  The patient was reviewed  Renal function is stable and no signs or symptoms of nephrotoxicity and/or infusion reactions were documented in the chart  Based on todays assessment, continue current vancomycin (day # 4) dosing of vancomycin 750 mg IV q12h, with a plan for trough to be drawn on 3/9/22   We will continue to follow the patients culture results and clinical progress daily      Diomedes Damian, Pharmacist

## 2022-03-03 NOTE — PLAN OF CARE
Problem: Potential for Falls  Goal: Patient will remain free of falls  Description: INTERVENTIONS:  - Educate patient/family on patient safety including physical limitations  - Instruct patient to call for assistance with activity   - Consult OT/PT to assist with strengthening/mobility   - Keep Call bell within reach  - Keep bed low and locked with side rails adjusted as appropriate  - Keep care items and personal belongings within reach  - Initiate and maintain comfort rounds  - Make Fall Risk Sign visible to staff  - Offer Toileting every 2 Hours, in advance of need  - Initiate/Maintain Bed alarm  - Obtain necessary fall risk management equipment: Bed Alarm  - Apply yellow socks and bracelet for high fall risk patients  - Consider moving patient to room near nurses station  Outcome: Progressing     Problem: MOBILITY - ADULT  Goal: Maintain or return to baseline ADL function  Description: INTERVENTIONS:  -  Assess patient's ability to carry out ADLs; assess patient's baseline for ADL function and identify physical deficits which impact ability to perform ADLs (bathing, care of mouth/teeth, toileting, grooming, dressing, etc )  - Assess/evaluate cause of self-care deficits   - Assess range of motion  - Assess patient's mobility; develop plan if impaired  - Assess patient's need for assistive devices and provide as appropriate  - Encourage maximum independence but intervene and supervise when necessary  - Involve family in performance of ADLs  - Assess for home care needs following discharge   - Consider OT consult to assist with ADL evaluation and planning for discharge  - Provide patient education as appropriate  Outcome: Progressing  Goal: Maintains/Returns to pre admission functional level  Description: INTERVENTIONS:  - Perform BMAT or MOVE assessment daily    - Set and communicate daily mobility goal to care team and patient/family/caregiver     - Collaborate with rehabilitation services on mobility goals if consulted  - Perform Range of Motion 4 times a day  - Reposition patient every 2 hours    - Dangle patient 4 times a day  - Stand patient 4 times a day  - Ambulate patient 3 times a day  - Out of bed to chair 3 times a day   - Out of bed for meals 3 times a day  - Out of bed for toileting  - Record patient progress and toleration of activity level   Outcome: Progressing     Problem: PAIN - ADULT  Goal: Verbalizes/displays adequate comfort level or baseline comfort level  Description: Interventions:  - Encourage patient to monitor pain and request assistance  - Assess pain using appropriate pain scale  - Administer analgesics based on type and severity of pain and evaluate response  - Implement non-pharmacological measures as appropriate and evaluate response  - Consider cultural and social influences on pain and pain management  - Notify physician/advanced practitioner if interventions unsuccessful or patient reports new pain  Outcome: Progressing     Problem: INFECTION - ADULT  Goal: Absence or prevention of progression during hospitalization  Description: INTERVENTIONS:  - Assess and monitor for signs and symptoms of infection  - Monitor lab/diagnostic results  - Monitor all insertion sites, i e  indwelling lines, tubes, and drains  - Monitor endotracheal if appropriate and nasal secretions for changes in amount and color  - [place] appropriate cooling/warming therapies per order  - Administer medications as ordered  - Instruct and encourage patient and family to use good hand hygiene technique  - Identify and instruct in appropriate isolation precautions for identified infection/condition  Outcome: Progressing  Goal: Absence of fever/infection during neutropenic period  Description: INTERVENTIONS:  - Monitor WBC    Outcome: Progressing     Problem: SAFETY ADULT  Goal: Patient will remain free of falls  Description: INTERVENTIONS:  - Educate patient/family on patient safety including physical limitations  - Instruct patient to call for assistance with activity   - Consult OT/PT to assist with strengthening/mobility   - Keep Call bell within reach  - Keep bed low and locked with side rails adjusted as appropriate  - Keep care items and personal belongings within reach  - Initiate and maintain comfort rounds  - Make Fall Risk Sign visible to staff  - Offer Toileting every 2 Hours, in advance of need  - Initiate/Maintain Bed alarm  - Obtain necessary fall risk management equipment: Bed Alarm  - Apply yellow socks and bracelet for high fall risk patients  - Consider moving patient to room near nurses station  Outcome: Progressing  Goal: Maintain or return to baseline ADL function  Description: INTERVENTIONS:  -  Assess patient's ability to carry out ADLs; assess patient's baseline for ADL function and identify physical deficits which impact ability to perform ADLs (bathing, care of mouth/teeth, toileting, grooming, dressing, etc )  - Assess/evaluate cause of self-care deficits   - Assess range of motion  - Assess patient's mobility; develop plan if impaired  - Assess patient's need for assistive devices and provide as appropriate  - Encourage maximum independence but intervene and supervise when necessary  - Involve family in performance of ADLs  - Assess for home care needs following discharge   - Consider OT consult to assist with ADL evaluation and planning for discharge  - Provide patient education as appropriate  Outcome: Progressing  Goal: Maintains/Returns to pre admission functional level  Description: INTERVENTIONS:  - Perform BMAT or MOVE assessment daily    - Set and communicate daily mobility goal to care team and patient/family/caregiver  - Collaborate with rehabilitation services on mobility goals if consulted  - Perform Range of Motion 4 times a day  - Reposition patient every 2 hours    - Dangle patient 4 times a day  - Stand patient 4 times a day  - Ambulate patient 3 times a day  - Out of bed to chair 3 times a day   - Out of bed for meals 3 times a day  - Out of bed for toileting  - Record patient progress and toleration of activity level   Outcome: Progressing     Problem: DISCHARGE PLANNING  Goal: Discharge to home or other facility with appropriate resources  Description: INTERVENTIONS:  - Identify barriers to discharge w/patient and caregiver  - Arrange for needed discharge resources and transportation as appropriate  - Identify discharge learning needs (meds, wound care, etc )  - Arrange for interpretive services to assist at discharge as needed  - Refer to Case Management Department for coordinating discharge planning if the patient needs post-hospital services based on physician/advanced practitioner order or complex needs related to functional status, cognitive ability, or social support system  Outcome: Progressing     Problem: Knowledge Deficit  Goal: Patient/family/caregiver demonstrates understanding of disease process, treatment plan, medications, and discharge instructions  Description: Complete learning assessment and assess knowledge base  Interventions:  - Provide teaching at level of understanding  - Provide teaching via preferred learning methods  Outcome: Progressing     Problem: Nutrition/Hydration-ADULT  Goal: Nutrient/Hydration intake appropriate for improving, restoring or maintaining nutritional needs  Description: Monitor and assess patient's nutrition/hydration status for malnutrition  Collaborate with interdisciplinary team and initiate plan and interventions as ordered  Monitor patient's weight and dietary intake as ordered or per policy  Utilize nutrition screening tool and intervene as necessary  Determine patient's food preferences and provide high-protein, high-caloric foods as appropriate       INTERVENTIONS:  - Monitor oral intake, urinary output, labs, and treatment plans  - Assess nutrition and hydration status and recommend course of action  - Evaluate amount of meals eaten  - Assist patient with eating if necessary   - Allow adequate time for meals  - Recommend/ encourage appropriate diets, oral nutritional supplements, and vitamin/mineral supplements  - Order, calculate, and assess calorie counts as needed  - Assess need for intravenous fluids  - Provide nutrition/hydration education as appropriate  - Include patient/family/caregiver in decisions related to nutrition  Outcome: Progressing     Problem: Prexisting or High Potential for Compromised Skin Integrity  Goal: Skin integrity is maintained or improved  Description: INTERVENTIONS:  - Identify patients at risk for skin breakdown  - Assess and monitor skin integrity  - Assess and monitor nutrition and hydration status  - Monitor labs   - Assess for incontinence   - Turn and reposition patient  - Assist with mobility/ambulation  - Relieve pressure over bony prominences  - Avoid friction and shearing  - Provide appropriate hygiene as needed including keeping skin clean and dry  - Evaluate need for skin moisturizer/barrier cream  - Collaborate with interdisciplinary team   - Patient/family teaching  - Consider wound care consult   Outcome: Progressing

## 2022-03-03 NOTE — CASE MANAGEMENT
Case Management Discharge Planning Note    Patient name Robbi Sher /-64 MRN [id number]  : 1945 Date 3/3/2022       Current Admission Date: 2022  Current Admission Diagnosis:Wound cellulitis   Patient Active Problem List    Diagnosis Date Noted    Thrombocytosis 2022    Wound cellulitis 2021    Continuous opioid dependence ([facility] [address] ) 2021    Severe malnutrition ([facility] [address] ) 2021    Peripheral vascular disease of extremity ([facility] ) 2021    Need for transfer to another facility 2021    Acute blood loss anemia 2021    Open wound of left lower leg 2021    Open wound of right lower leg 2021    Cellulitis of left foot 2021    Microcytic anemia 2021    Hammer toe 10/25/2019    Foot pain 10/25/2019    Encounter to establish care 10/25/2019    IFG (impaired fasting glucose) 2017    Senile osteoporosis 10/31/2017    Combined hyperlipidemia 2014    Essential (primary) hypertension 2014      LOS (days): 3  Geometric Mean LOS (GMLOS) (days): 3 20  Days to GMLOS:0 5     OBJECTIVE:  Risk of Unplanned Readmission Score: 14   Bundled Patient Payment:  (The pt is not a Bundle)     Current admission status: Inpatient   Preferred Pharmacy:   [address] [place] [address]  Phone: [phone] Fax: [phone]    Primary Care Provider: TELLY Ruiz    Primary Insurance: [address] [place] [address] REP  Secondary Insurance:     DISCHARGE DETAILS:    Discharge planning discussed with[de-identified] [facility] able to assist  pt agreeable  pt reports granddaughter is also able to help w wound care   cm to follow  Freedom of Choice: Yes                        [facility] Name[de-identified]  ([facility])

## 2022-03-04 LAB
BACTERIA WND AEROBE CULT: ABNORMAL
GRAM STN SPEC: ABNORMAL

## 2022-03-04 PROCEDURE — 99239 HOSP IP/OBS DSCHRG MGMT >30: CPT | Performed by: INTERNAL MEDICINE

## 2022-03-04 RX ORDER — SULFAMETHOXAZOLE AND TRIMETHOPRIM 800; 160 MG/1; MG/1
1 TABLET ORAL EVERY 12 HOURS SCHEDULED
Status: DISCONTINUED | OUTPATIENT
Start: 2022-03-04 | End: 2022-03-04 | Stop reason: HOSPADM

## 2022-03-04 RX ORDER — SULFAMETHOXAZOLE AND TRIMETHOPRIM 800; 160 MG/1; MG/1
1 TABLET ORAL EVERY 12 HOURS SCHEDULED
Qty: 9 TABLET | Refills: 0 | Status: SHIPPED | OUTPATIENT
Start: 2022-03-04 | End: 2022-03-09

## 2022-03-04 RX ADMIN — CALCIUM 1 TABLET: 500 TABLET ORAL at 08:19

## 2022-03-04 RX ADMIN — CYANOCOBALAMIN TAB 500 MCG 1000 MCG: 500 TAB at 08:19

## 2022-03-04 RX ADMIN — SULFAMETHOXAZOLE AND TRIMETHOPRIM 1 TABLET: 800; 160 TABLET ORAL at 10:38

## 2022-03-04 RX ADMIN — ENOXAPARIN SODIUM 40 MG: 40 INJECTION SUBCUTANEOUS at 08:19

## 2022-03-04 NOTE — PLAN OF CARE
Problem: Potential for Falls  Goal: Patient will remain free of falls  Description: INTERVENTIONS:  - Educate patient/family on patient safety including physical limitations  - Instruct patient to call for assistance with activity   - Consult OT/PT to assist with strengthening/mobility   - Keep Call bell within reach  - Keep bed low and locked with side rails adjusted as appropriate  - Keep care items and personal belongings within reach  - Initiate and maintain comfort rounds  - Make Fall Risk Sign visible to staff  - Offer Toileting every  Hours, in advance of need  - Initiate/Maintain alarm  - Obtain necessary fall risk management equipment:   - Apply yellow socks and bracelet for high fall risk patients  - Consider moving patient to room near nurses station  Outcome: Adequate for Discharge     Problem: MOBILITY - ADULT  Goal: Maintain or return to baseline ADL function  Description: INTERVENTIONS:  -  Assess patient's ability to carry out ADLs; assess patient's baseline for ADL function and identify physical deficits which impact ability to perform ADLs (bathing, care of mouth/teeth, toileting, grooming, dressing, etc )  - Assess/evaluate cause of self-care deficits   - Assess range of motion  - Assess patient's mobility; develop plan if impaired  - Assess patient's need for assistive devices and provide as appropriate  - Encourage maximum independence but intervene and supervise when necessary  - Involve family in performance of ADLs  - Assess for home care needs following discharge   - Consider OT consult to assist with ADL evaluation and planning for discharge  - Provide patient education as appropriate  Outcome: Adequate for Discharge  Goal: Maintains/Returns to pre admission functional level  Description: INTERVENTIONS:  - Perform BMAT or MOVE assessment daily    - Set and communicate daily mobility goal to care team and patient/family/caregiver     - Collaborate with rehabilitation services on mobility goals if consulted  - Perform Range of Motion  times a day  - Reposition patient every  hours    - Dangle patient  times a day  - Stand patient  times a day  - Ambulate patient  times a day  - Out of bed to chair  times a day   - Out of bed for meals  times a day  - Out of bed for toileting  - Record patient progress and toleration of activity level   Outcome: Adequate for Discharge     Problem: PAIN - ADULT  Goal: Verbalizes/displays adequate comfort level or baseline comfort level  Description: Interventions:  - Encourage patient to monitor pain and request assistance  - Assess pain using appropriate pain scale  - Administer analgesics based on type and severity of pain and evaluate response  - Implement non-pharmacological measures as appropriate and evaluate response  - Consider cultural and social influences on pain and pain management  - Notify physician/advanced practitioner if interventions unsuccessful or patient reports new pain  Outcome: Adequate for Discharge     Problem: INFECTION - ADULT  Goal: Absence or prevention of progression during hospitalization  Description: INTERVENTIONS:  - Assess and monitor for signs and symptoms of infection  - Monitor lab/diagnostic results  - Monitor all insertion sites, i e  indwelling lines, tubes, and drains  - Monitor endotracheal if appropriate and nasal secretions for changes in amount and color  - [place] appropriate cooling/warming therapies per order  - Administer medications as ordered  - Instruct and encourage patient and family to use good hand hygiene technique  - Identify and instruct in appropriate isolation precautions for identified infection/condition  Outcome: Adequate for Discharge  Goal: Absence of fever/infection during neutropenic period  Description: INTERVENTIONS:  - Monitor WBC    Outcome: Adequate for Discharge     Problem: SAFETY ADULT  Goal: Patient will remain free of falls  Description: INTERVENTIONS:  - Educate patient/family on patient safety including physical limitations  - Instruct patient to call for assistance with activity   - Consult OT/PT to assist with strengthening/mobility   - Keep Call bell within reach  - Keep bed low and locked with side rails adjusted as appropriate  - Keep care items and personal belongings within reach  - Initiate and maintain comfort rounds  - Make Fall Risk Sign visible to staff  - Offer Toileting every  Hours, in advance of need  - Initiate/Maintain alarm  - Obtain necessary fall risk management equipment:   - Apply yellow socks and bracelet for high fall risk patients  - Consider moving patient to room near nurses station  Outcome: Adequate for Discharge  Goal: Maintain or return to baseline ADL function  Description: INTERVENTIONS:  -  Assess patient's ability to carry out ADLs; assess patient's baseline for ADL function and identify physical deficits which impact ability to perform ADLs (bathing, care of mouth/teeth, toileting, grooming, dressing, etc )  - Assess/evaluate cause of self-care deficits   - Assess range of motion  - Assess patient's mobility; develop plan if impaired  - Assess patient's need for assistive devices and provide as appropriate  - Encourage maximum independence but intervene and supervise when necessary  - Involve family in performance of ADLs  - Assess for home care needs following discharge   - Consider OT consult to assist with ADL evaluation and planning for discharge  - Provide patient education as appropriate  Outcome: Adequate for Discharge  Goal: Maintains/Returns to pre admission functional level  Description: INTERVENTIONS:  - Perform BMAT or MOVE assessment daily    - Set and communicate daily mobility goal to care team and patient/family/caregiver  - Collaborate with rehabilitation services on mobility goals if consulted  - Perform Range of Motion  times a day  - Reposition patient every  hours    - Dangle patient  times a day  - Stand patient  times a day  - Ambulate patient  times a day  - Out of bed to chair  times a day   - Out of bed for meals  times a day  - Out of bed for toileting  - Record patient progress and toleration of activity level   Outcome: Adequate for Discharge     Problem: DISCHARGE PLANNING  Goal: Discharge to home or other facility with appropriate resources  Description: INTERVENTIONS:  - Identify barriers to discharge w/patient and caregiver  - Arrange for needed discharge resources and transportation as appropriate  - Identify discharge learning needs (meds, wound care, etc )  - Arrange for interpretive services to assist at discharge as needed  - Refer to Case Management Department for coordinating discharge planning if the patient needs post-hospital services based on physician/advanced practitioner order or complex needs related to functional status, cognitive ability, or social support system  Outcome: Adequate for Discharge     Problem: Knowledge Deficit  Goal: Patient/family/caregiver demonstrates understanding of disease process, treatment plan, medications, and discharge instructions  Description: Complete learning assessment and assess knowledge base  Interventions:  - Provide teaching at level of understanding  - Provide teaching via preferred learning methods  Outcome: Adequate for Discharge     Problem: Nutrition/Hydration-ADULT  Goal: Nutrient/Hydration intake appropriate for improving, restoring or maintaining nutritional needs  Description: Monitor and assess patient's nutrition/hydration status for malnutrition  Collaborate with interdisciplinary team and initiate plan and interventions as ordered  Monitor patient's weight and dietary intake as ordered or per policy  Utilize nutrition screening tool and intervene as necessary  Determine patient's food preferences and provide high-protein, high-caloric foods as appropriate       INTERVENTIONS:  - Monitor oral intake, urinary output, labs, and treatment plans  - Assess nutrition and hydration status and recommend course of action  - Evaluate amount of meals eaten  - Assist patient with eating if necessary   - Allow adequate time for meals  - Recommend/ encourage appropriate diets, oral nutritional supplements, and vitamin/mineral supplements  - Order, calculate, and assess calorie counts as needed  - Assess need for intravenous fluids  - Provide nutrition/hydration education as appropriate  - Include patient/family/caregiver in decisions related to nutrition  Outcome: Adequate for Discharge     Problem: Prexisting or High Potential for Compromised Skin Integrity  Goal: Skin integrity is maintained or improved  Description: INTERVENTIONS:  - Identify patients at risk for skin breakdown  - Assess and monitor skin integrity  - Assess and monitor nutrition and hydration status  - Monitor labs   - Assess for incontinence   - Turn and reposition patient  - Assist with mobility/ambulation  - Relieve pressure over bony prominences  - Avoid friction and shearing  - Provide appropriate hygiene as needed including keeping skin clean and dry  - Evaluate need for skin moisturizer/barrier cream  - Collaborate with interdisciplinary team   - Patient/family teaching  - Consider wound care consult   Outcome: Adequate for Discharge

## 2022-03-04 NOTE — DISCHARGE INSTRUCTIONS
Cellulitis   WHAT YOU NEED TO KNOW:   Cellulitis is a skin infection caused by bacteria  Cellulitis may go away on its own or you may need treatment  Your healthcare provider may draw a [place] around the outside edges of your cellulitis  If your cellulitis spreads, your healthcare provider will see it outside of the [place]  DISCHARGE INSTRUCTIONS:   Call 911 if:   · You have sudden trouble breathing or chest pain  Seek care immediately if:   · Your wound gets larger and more painful  · You feel a crackling under your skin when you touch it  · You have purple dots or bumps on your skin, or you see bleeding under your skin  · You have new swelling and pain in your legs  · The red, warm, swollen area gets larger  · You see red streaks coming from the infected area  Contact your healthcare provider if:   · You have a fever  · Your fever or pain does not go away or gets worse  · The area does not get smaller after 2 days of antibiotics  · Your skin is flaking or peeling off  · You have questions or concerns about your condition or care  Medicines:   · Antibiotics  help treat the bacterial infection  · NSAIDs , such as ibuprofen, help decrease swelling, pain, and fever  NSAIDs can cause stomach bleeding or kidney problems in certain people  If you take blood thinner medicine, always ask if NSAIDs are safe for you  Always read the medicine label and follow directions  Do not give these medicines to children under 10months of age without direction from your child's healthcare provider  · Acetaminophen  decreases pain and fever  It is available without a doctor's order  Ask how much to take and how often to take it  Follow directions  Read the labels of all other medicines you are using to see if they also contain acetaminophen, or ask your doctor or pharmacist  Acetaminophen can cause liver damage if not taken correctly   Do not use more than 4 grams (4,000 milligrams) total of acetaminophen in one day  · Take your medicine as directed  Contact your healthcare provider if you think your medicine is not helping or if you have side effects  Tell him or her if you are allergic to any medicine  Keep a list of the medicines, vitamins, and herbs you take  Include the amounts, and when and why you take them  Bring the list or the pill bottles to follow-up visits  Carry your medicine list with you in case of an emergency  Self-care:   · Elevate the area above the level of your heart  as often as you can  This will help decrease swelling and pain  Prop the area on pillows or blankets to keep it elevated comfortably  · Clean the area daily until the wound scabs over  Gently wash the area with soap and water  Pat dry  Use dressings as directed  · Place cool or warm, wet cloths on the area as directed  Use clean cloths and clean water  Leave it on the area until the cloth is room temperature  Pat the area dry with a clean, dry cloth  The cloths may help decrease pain  Prevent cellulitis:   · Do not scratch bug bites or areas of injury  You increase your risk for cellulitis by scratching these areas  · Do not share personal items, such as towels, clothing, and razors  · Clean exercise equipment  with germ-killing  before and after you use it  · Wash your hands often  Use soap and water  Wash your hands after you use the bathroom, change a child's diapers, or sneeze  Wash your hands before you prepare or eat food  Use lotion to prevent dry, cracked skin  · Wear pressure stockings as directed  You may be told to wear the stockings if you have peripheral edema  The stockings improve blood flow and decrease swelling  · Treat athlete's foot  This can help prevent the spread of a bacterial skin infection  Follow up with your healthcare provider within 3 days, or as directed:   Your healthcare provider will check if your cellulitis is getting better  You may need different medicine  Write down your questions so you remember to ask them during your visits  © Copyright [address] Information is for End User's use only and may not be sold, redistributed or otherwise used for commercial purposes  All illustrations and images included in CareNotes® are the copyrighted property of A D A M , Inc  or Tadeo Mancilla  The above information is an  only  It is not intended as medical advice for individual conditions or treatments  Talk to your doctor, nurse or pharmacist before following any medical regimen to see if it is safe and effective for you

## 2022-03-04 NOTE — NURSING NOTE
Patient discharge teaching done at this in time  Topics inculded wound care, diet, activity, medications and reason for admission  All questions answered  Patient verbalized understanding to AVS     Patient to personal vehicle via WC

## 2022-03-04 NOTE — PROGRESS NOTES
Vancomycin IV Pharmacy-to-Dose Consultation    Juan Trinh is a 67 y o  female who is currently receiving Vancomycin IV with management by the Pharmacy Consult service  Assessment/Plan:  The patient was reviewed  Renal function is stable and no signs or symptoms of nephrotoxicity and/or infusion reactions were documented in the chart  Based on todays assessment, continue current vancomycin (day # 5) dosing of 1500mg IV q24h, with a plan for trough to be drawn at 2130 on 3/6  We will continue to follow the patients culture results and clinical progress daily      Mindy Hsieh, Pharmacist

## 2022-03-04 NOTE — DISCHARGE SUMMARY
[facility]  Discharge- Macel Shevlin 1945, 67 y o  female MRN: [id number]  Unit/Bed#: -[facility]andro Encounter: [id number]  Primary Care Provider: TELLY Mendoza   Date and time admitted to hospital: 2/28/2022  5:10 PM    * Wound cellulitis  Assessment & Plan  · Has history of recurrent BL LE cellulitis w/MRSA infection (last d/c on 11/2021)  · Patient reports increasing redness in BL LE's over past 24h with increased drainage and pain in the left wound  · Seen by outpatient wound care today who had concerns of infection and recommended ED visit  · BL lower extremity large, wet, medial ulcers with surrounding erythema and warmth, without underlying fluctuance felt  · S/p XR no evidence of osteomyelitis  S/p LE duplex no DVT   · Pt was seen per podiatry, no surgical intervention  Contd wound care  · Patient was txt with ctx and vancomycin x 4 days  Cx polymicrobial revealing MRSA, achromabacter xylosoxidans sens to bactrim  Will d/c on bactrim to complete for another 5-6 days  · S/p Wound care consult  · Pt to d/c home with home care, visiting RN   She is follow up at wound care clinic in 1 week    Thrombocytosis  Assessment & Plan  · Chronic hx, acute increase likely reactive in setting of acute infection        Discharging Physician / Practitioner: Singh King DO  PCP: Hudson Lemus, [address] [place] [address]  Admission Date:   Admission Orders (From admission, onward)     Ordered        02/28/22 1920  Inpatient Admission  Once                      Discharge Date: 03/04/22    Medical Problems             Resolved Problems  Date Reviewed: 3/4/2022    None                Consultations During Hospital Stay:  · Podiatry  · Wound care    Procedures Performed:   · X-ray  · Lower extremity duplex, venous    Significant Findings / Test Results:   · See above    Incidental Findings:   ·     Test Results Pending at Discharge (will require follow up):   ·      Outpatient Tests Requested:  ·     Complications:  None    Reason for Admission:  Bilateral lower extremity erythema    Hospital Course:     HPI: Emy Loyd is a 67 y o  female with a PMH of MRSA colonization, BL chronic LE wounds, HTN, and HLP who presents with  Increasing BL lower extremity redness  Patient with history of chronic bilateral lower extremity wounds, with history of MRSA infection, was seen by outpatient wound care today who had concerns for bilateral lower extremity cellulitis  Patient reports having chronic large ulcers on each lower extremity, and reports increasing redness in both over the past 24 hours; with increased drainage and pain from the left wound  Denies fever, chill, recent sick contact, headache, visual disturbance, dizzy/lightheadedness, chest pain, palpitations, SOB, ABD pain, N/V/D, new numbness/tingling/weakness, or other symptom  All questions answered at the bedside the patient's satisfaction  Please see above list of diagnoses and related plan for additional information  Condition at Discharge: stable     Discharge Day Visit / Exam:     Subjective:  No acute events overnight  Feels well  Afebrile, non toxic appearing  Vitals: Blood Pressure: 152/88 (03/03/22 2300)  Pulse: 72 (03/03/22 2300)  Temperature: 98 °F (36 7 °C) (03/03/22 2300)  Temp Source: Axillary (03/03/22 2300)  Respirations: 19 (03/03/22 2300)  Height: 5' 2" (157 5 cm) (02/28/22 2119)  Weight - Scale: 58 1 kg (128 lb 1 4 oz) (02/28/22 2119)  SpO2: 98 % (03/03/22 2300)  Exam:   Physical Exam  Cardiovascular:      Rate and Rhythm: Normal rate and regular rhythm  Pulses: Normal pulses  Heart sounds: Normal heart sounds  No murmur heard  Pulmonary:      Effort: Pulmonary effort is normal  No respiratory distress  Breath sounds: Normal breath sounds  No wheezing or rales  Abdominal:      General: Abdomen is flat  Bowel sounds are normal  There is no distension  Palpations: Abdomen is soft  Tenderness: There is no abdominal tenderness  There is no guarding  Musculoskeletal:         General: Normal range of motion  Cervical back: Normal range of motion and neck supple  Right lower leg: No edema  Left lower leg: No edema  Skin:     General: Skin is warm and dry  Comments: Bilateral lower extremity dressing in place  Erythema improved   Neurological:      General: No focal deficit present  Mental Status: She is alert and oriented to person, place, and time  Mental status is at baseline  Cranial Nerves: No cranial nerve deficit  Motor: No weakness  Discussion with Family:  Plan of care discussed with patient at length  I also called her granddaughter, no answer left message    Discharge instructions/Information to patient and family:   See after visit summary for information provided to patient and family  Provisions for Follow-Up Care:  See after visit summary for information related to follow-up care and any pertinent home health orders  Disposition:     Home with VNA Services (Reminder: Complete face to face encounter)    For Discharges to [facility]:   · Not Applicable to this Patient - Not Applicable to this Patient    Planned Readmission:  No     Discharge Statement:  I spent 35 minutes discharging the patient  This time was spent on the day of discharge  I had direct contact with the patient on the day of discharge  Greater than 50% of the total time was spent examining patient, answering all patient questions, arranging and discussing plan of care with patient as well as directly providing post-discharge instructions  Additional time then spent on discharge activities  Discharge Medications:  See after visit summary for reconciled discharge medications provided to patient and family        ** Please Note: This note has been constructed using a voice recognition system **

## 2022-03-04 NOTE — ASSESSMENT & PLAN NOTE
· Has history of recurrent BL LE cellulitis w/MRSA infection (last d/c on 11/2021)  · Patient reports increasing redness in BL LE's over past 24h with increased drainage and pain in the left wound  · Seen by outpatient wound care today who had concerns of infection and recommended ED visit  · BL lower extremity large, wet, medial ulcers with surrounding erythema and warmth, without underlying fluctuance felt  · S/p XR no evidence of osteomyelitis  S/p LE duplex no DVT   · Pt was seen per podiatry, no surgical intervention  Contd wound care  · Patient was txt with ctx and vancomycin x 4 days  Cx polymicrobial revealing MRSA, achromabacter xylosoxidans sens to bactrim  Will d/c on bactrim to complete for another 5-6 days  · S/p Wound care consult  · Pt to d/c home with home care, visiting RN   She is follow up at wound care clinic in 1 week

## 2022-03-04 NOTE — CASE MANAGEMENT
Case Management Discharge Planning Note    Patient name Danna Johnsoniver  Location /-[facility] MRN [id number]  : 1945 Date 3/4/2022       Current Admission Date: 2022  Current Admission Diagnosis:Wound cellulitis   Patient Active Problem List    Diagnosis Date Noted    Thrombocytosis 2022    Wound cellulitis 2021    Continuous opioid dependence ([facility] ) 2021    Severe malnutrition ([facility] ) 2021    Peripheral vascular disease of extremity ([facility] [facility] ) 2021    Need for transfer to another facility 2021    Acute blood loss anemia 2021    Open wound of left lower leg 2021    Open wound of right lower leg 2021    Cellulitis of left foot 2021    Microcytic anemia 2021    Hammer toe 10/25/2019    Foot pain 10/25/2019    Encounter to establish care 10/25/2019    IFG (impaired fasting glucose) 2017    Senile osteoporosis 10/31/2017    Combined hyperlipidemia 2014    Essential (primary) hypertension 2014      LOS (days): 4  Geometric Mean LOS (GMLOS) (days): 3 20  Days to GMLOS:-0 5     OBJECTIVE:  Risk of Unplanned Readmission Score: 15   Bundled Patient Payment:  (The pt is not a Bundle)     Current admission status: Inpatient   Preferred Pharmacy:   [address] [place] [address]  Phone: [phone] Fax: [phone]    Primary Care Provider: TELLY Engle    Primary Insurance: [address] [place] [address]  Secondary Insurance:     DISCHARGE DETAILS:    Discharge planning discussed with[de-identified] IMM provided to pt  Freedom of Choice: Yes                                                                                IMM Given (Date):: 22  IMM Given to[de-identified] Patient

## 2022-03-06 LAB
BACTERIA BLD CULT: NORMAL
BACTERIA BLD CULT: NORMAL

## 2022-03-07 ENCOUNTER — TRANSITIONAL CARE MANAGEMENT (OUTPATIENT)
Dept: FAMILY MEDICINE CLINIC | Facility: CLINIC | Age: 77
End: 2022-03-07

## 2022-03-08 NOTE — UTILIZATION REVIEW
Notification of Discharge   This is a Notification of Discharge from our facility [address]  Please be advised that this patient has been discharge from our facility  Below you will find the admission and discharge date and time including the patients disposition  UTILIZATION REVIEW CONTACT:  Renetta Nj  Utilization   Network Utilization Review Department  Phone: [phone] x carefully listen to the prompts  All voicemails are confidential   Email: [email or web address]     PHYSICIAN ADVISORY SERVICES:  FOR MWQI-SE-AZZX REVIEW - MEDICAL NECESSITY DENIAL  Phone: [phone]  Fax: [phone]  Email: [email or web address]     PRESENTATION DATE: 2/28/2022  5:10 PM  OBERVATION ADMISSION DATE:   INPATIENT ADMISSION DATE: 2/28/22  7:20 PM   DISCHARGE DATE: 3/4/2022  2:10 PM  DISPOSITION: Home with [place] with [address] INFORMATION:  Send all requests for admission clinical reviews, approved or denied determinations and any other requests to dedicated fax number below belonging to the campus where the patient is receiving treatment   List of dedicated fax numbers:  [address] [place] [address] DENIALS (Administrative/Medical Necessity) [phone]   [address]  (Maternity/NICU/Pediatrics) [phone]   [facility] ([place], [place]) [phone]   [address] [facility] [phone]   [address] [place] [address] [phone]   [address] [place] [address],[address] [phone]   [facility]  [phone]   [address]  [address] [facility] [address] [facility] [phone]

## 2022-03-11 ENCOUNTER — OFFICE VISIT (OUTPATIENT)
Dept: WOUND CARE | Facility: CLINIC | Age: 77
End: 2022-03-11
Payer: COMMERCIAL

## 2022-03-11 VITALS
SYSTOLIC BLOOD PRESSURE: 133 MMHG | HEART RATE: 104 BPM | TEMPERATURE: 98.3 F | DIASTOLIC BLOOD PRESSURE: 69 MMHG | RESPIRATION RATE: 20 BRPM

## 2022-03-11 DIAGNOSIS — S81.801A OPEN WOUND OF RIGHT LOWER LEG, INITIAL ENCOUNTER: ICD-10-CM

## 2022-03-11 DIAGNOSIS — I73.9 PERIPHERAL VASCULAR DISEASE OF EXTREMITY (HCC): ICD-10-CM

## 2022-03-11 DIAGNOSIS — E43 SEVERE MALNUTRITION (HCC): ICD-10-CM

## 2022-03-11 DIAGNOSIS — S81.802A OPEN WOUND OF LEFT LOWER LEG, INITIAL ENCOUNTER: Primary | ICD-10-CM

## 2022-03-11 PROCEDURE — 11045 DBRDMT SUBQ TISS EACH ADDL: CPT | Performed by: ORTHOPAEDIC SURGERY

## 2022-03-11 PROCEDURE — 11042 DBRDMT SUBQ TIS 1ST 20SQCM/<: CPT | Performed by: ORTHOPAEDIC SURGERY

## 2022-03-11 PROCEDURE — 99213 OFFICE O/P EST LOW 20 MIN: CPT | Performed by: ORTHOPAEDIC SURGERY

## 2022-03-11 RX ORDER — LIDOCAINE HYDROCHLORIDE 40 MG/ML
5 SOLUTION TOPICAL ONCE
Status: COMPLETED | OUTPATIENT
Start: 2022-03-11 | End: 2022-03-11

## 2022-03-11 RX ADMIN — LIDOCAINE HYDROCHLORIDE 5 ML: 40 SOLUTION TOPICAL at 13:16

## 2022-03-11 NOTE — PROGRESS NOTES
Patient ID: Milka Arevalo is a 67 y o  female Date of Birth 1945       Chief Complaint   Patient presents with    Follow Up Wound Care Visit     bilateral legs cellulitis       Allergies:  Aspirin and Warfarin    Diagnosis:   Diagnosis ICD-10-CM Associated Orders   1  Open wound of left lower leg, initial encounter  S81 802A lidocaine (XYLOCAINE) 4 % topical solution 5 mL     Wound home care     Wound cleansing and dressings     Debridement   2  Open wound of right lower leg, initial encounter  S81 801A lidocaine (XYLOCAINE) 4 % topical solution 5 mL     Wound home care     Wound cleansing and dressings     Debridement   3  Peripheral vascular disease of extremity ([facility])  I73 9    4  Severe malnutrition ([facility] [address] )  E43         Assessment and Plan :   BLE Open wound slowly improving  No signs of infection today   Debrided as below   Continue wound management with Hibiclens soak as well as Maxsorb Ag, see wound orders below    Continue compression with Tubigrip   Do not wet wounds   Counseled on importance of frequent elevation of leg   Counseled on adequate protein intake, 3-4 servings per day   Followup in 1 week or call sooner with questions or concerns    Subjective:   Patient presents for followup of BLE wounds  Since last visit pt was admitted to hospital for BLE Cellulitis  X-rays with no evidence of osteomyelitis and LE duplex with no DVT  Pt was positive for MRSA, Group B Strep and Achromobacter xylosoxidans  Since then pt was treated with 2 courses of antibiotics: IV Vanco and transitioned to PO Bactrim for another 6 days  Pt   doxycycline and Augmentin  Pt has been using Silver alginate on the wound and ACE bandage for compression  Pt states she still has pain and some drainage but denies any fevers or chills        The following portions of the patient's history were reviewed and updated as appropriate:   Patient Active Problem List   Diagnosis    Hammer toe    Combined hyperlipidemia    Essential (primary) hypertension    Foot pain    IFG (impaired fasting glucose)    Senile osteoporosis    Encounter to establish care    Open wound of left lower leg    Open wound of right lower leg    Cellulitis of left foot    Microcytic anemia    Acute blood loss anemia    Severe malnutrition (HCC)    Peripheral vascular disease of extremity (HCC)    Need for transfer to another facility    Continuous opioid dependence ([facility] [address] )    Wound cellulitis    Thrombocytosis     Past Medical History:   Diagnosis Date    Hyperlipidemia     Hypertension     Osteoporosis     Shingles      Past Surgical History:   Procedure Laterality Date    CATARACT EXTRACTION Bilateral     FOOT SURGERY Right     reconstruction    TOTAL HIP ARTHROPLASTY Bilateral      Family History   Family history unknown: Yes     Social History     Socioeconomic History    Marital status:      Spouse name: None    Number of children: None    Years of education: None    Highest education level: None   Occupational History    None   Tobacco Use    Smoking status: Former Smoker    Smokeless tobacco: Never Used    Tobacco comment: quit 50 years ago   Vaping Use    Vaping Use: Never used   Substance and Sexual Activity    Alcohol use: Yes     Alcohol/week: 7 0 standard drinks     Types: 7 Glasses of wine per week    Drug use: Never    Sexual activity: None   Other Topics Concern    None   Social History Narrative    None     Social Determinants of Health     Financial Resource Strain: Not on file   Food Insecurity: No Food Insecurity    Worried About Running Out of Food in the Last Year: Never true    Marcio of Food in the Last Year: Never true   Transportation Needs: No Transportation Needs    Lack of Transportation (Medical): No    Lack of Transportation (Non-Medical):  No   Physical Activity: Not on file   Stress: Not on file   Social Connections: Not on file   Intimate Partner Violence: Not on file   Housing Stability: Low Risk     Unable to Pay for Housing in the Last Year: No    Number of Places Lived in the Last Year: 1    Unstable Housing in the Last Year: No       Current Outpatient Medications:     calcium carbonate-vitamin D (OSCAL-D) 500 mg-200 units per tablet, Take 1 tablet by mouth daily, Disp: , Rfl:     Cyanocobalamin (B-12) 1000 MCG CAPS, Take 1 capsule by mouth in the morning  , Disp: , Rfl:     gabapentin (NEURONTIN) 300 mg capsule, Take 2 capsules (600 mg total) by mouth daily at bedtime, Disp: 90 capsule, Rfl: 1    Incontinence Supply Disposable (BRIEFS OVERNIGHT MEDIUM) MISC, by Does not apply route 3 (three) times a day, Disp: 30 each, Rfl: 3    Wound Dressings (Adaptic Non-Adhering Dressing) PADS, Apply 30 each topically 2 (two) times a day, Disp: 30 each, Rfl: 0  No current facility-administered medications for this visit  Review of Systems   Constitutional: Negative for appetite change, chills, fatigue, fever and unexpected weight change  HENT: Negative for congestion, hearing loss and postnasal drip  Respiratory: Negative for cough and shortness of breath  Cardiovascular: Positive for leg swelling  Musculoskeletal: Positive for gait problem (wheelchair)  Skin: Positive for wound (BLE)  Negative for rash  Neurological: Negative for numbness  Hematological: Does not bruise/bleed easily  Psychiatric/Behavioral: Negative  Objective:  /69   Pulse 104   Temp 98 3 °F (36 8 °C)   Resp 20   Pain Score: 0-No pain     Physical Exam  Vitals reviewed  Constitutional:       General: She is not in acute distress  Appearance: Normal appearance  She is well-developed  She is obese  HENT:      Head: Normocephalic and atraumatic  Cardiovascular:      Rate and Rhythm: Normal rate  Pulmonary:      Effort: Pulmonary effort is normal    Musculoskeletal:         General: No deformity  Right lower leg: Edema present  Left lower leg: Edema present  Skin:     General: Skin is warm and dry  Findings: Wound (BLE) present  Comments: +Biofilm, slough, and pink granulation tissue  See wound assessment   Neurological:      General: No focal deficit present  Mental Status: She is alert and oriented to person, place, and time  Gait: Gait abnormal    Psychiatric:         Mood and Affect: Mood and affect normal          Behavior: Behavior normal  Behavior is cooperative  Wound 02/28/22 Leg Right;Medial (Active)   Wound Description Pink;Pale;Yellow 03/11/22 1311   Jessica-wound Assessment Pink;Dry;Fragile 03/11/22 1311   Wound Length (cm) 11 5 cm 03/11/22 1311   Wound Width (cm) 5 5 cm 03/11/22 1311   Wound Depth (cm) 0 1 cm 03/11/22 1311   Wound Surface Area (cm^2) 63 25 cm^2 03/11/22 1311   Wound Volume (cm^3) 6 325 cm^3 03/11/22 1311   Calculated Wound Volume (cm^3) 6 33 cm^3 03/11/22 1311   Change in Wound Size % -83 48 03/11/22 1311   Drainage Amount Moderate 03/11/22 1311   Drainage Description Serosanguineous 03/11/22 1311   Non-staged Wound Description Full thickness 03/11/22 1311   Dressing Status Intact 03/11/22 1311       Wound 02/28/22 Leg Left;Medial (Active)   Wound Description Pink;Slough; Yellow;Granulation tissue 03/11/22 1313   Jessica-wound Assessment Pink;Dry;Fragile 03/11/22 1313   Wound Length (cm) 14 8 cm 03/11/22 1313   Wound Width (cm) 8 5 cm 03/11/22 1313   Wound Depth (cm) 0 1 cm 03/11/22 1313   Wound Surface Area (cm^2) 125 8 cm^2 03/11/22 1313   Wound Volume (cm^3) 12 58 cm^3 03/11/22 1313   Calculated Wound Volume (cm^3) 12 58 cm^3 03/11/22 1313   Change in Wound Size % 18 31 03/11/22 1313   Drainage Amount Large 03/11/22 1313   Drainage Description Serosanguineous 03/11/22 1313   Non-staged Wound Description Full thickness 03/11/22 1313   Dressing Status Intact 03/11/22 1313         Debridement   Wound 02/28/22 Leg Right;Medial    Universal Protocol:  Consent: Verbal consent obtained   Written consent obtained  Risks and benefits: risks, benefits and alternatives were discussed  Consent given by: patient  Time out: Immediately prior to procedure a "time out" was called to verify the correct patient, procedure, equipment, support staff and site/side marked as required  Patient understanding: patient states understanding of the procedure being performed  Patient identity confirmed: verbally with patient      Performed by: PA  Debridement type: surgical  Level of debridement: subcutaneous tissue  Pain control: lidocaine 4%  Post-debridement measurements  Length (cm): 11 5  Width (cm): 5 5  Depth (cm): 0 2  Percent debrided: 100%  Surface Area (cm^2): 63 25  Area debrided (cm^2): 63 25  Volume (cm^3): 12 65  Tissue and other material debrided: subcutaneous tissue  Devitalized tissue debrided: biofilm, exudate, fibrin and slough  Instrument(s) utilized: curette and blade  Bleeding: small  Hemostasis obtained with: pressure  Procedural pain (0-10): 0  Post-procedural pain: 0   Response to treatment: procedure was tolerated well    Debridement   Universal Protocol:  Consent: Verbal consent obtained  Written consent obtained  Risks and benefits: risks, benefits and alternatives were discussed  Consent given by: patient  Time out: Immediately prior to procedure a "time out" was called to verify the correct patient, procedure, equipment, support staff and site/side marked as required    Patient understanding: patient states understanding of the procedure being performed  Patient identity confirmed: verbally with patient      Performed by: PA  Debridement type: surgical  Level of debridement: subcutaneous tissue  Pain control: lidocaine 4%  Post-debridement measurements  Length (cm): 14 8  Width (cm): 8 5  Depth (cm): 0 2  Percent debrided: 100%  Surface Area (cm^2): 125 8  Area debrided (cm^2): 125 8  Volume (cm^3): 25 16  Tissue and other material debrided: subcutaneous tissue  Devitalized tissue debrided: biofilm, exudate, fibrin and slough  Instrument(s) utilized: curette  Bleeding: small  Hemostasis obtained with: pressure  Procedural pain (0-10): 0  Post-procedural pain: 0   Response to treatment: procedure was tolerated well                   Results from last 6 Months   Lab Units 03/01/22  1443   WOUND CULTURE  3+ Growth of Methicillin Resistant Staphylococcus aureus*  3+ Growth of   4+ Growth of Beta Hemolytic Streptococcus Group G*  3+ Growth of Methicillin Resistant Staphylococcus aureus*  2+ Growth of Achromobacter xylosoxidans*  3+ Growth of          Wound Instructions:  Orders Placed This Encounter   Procedures    Wound home care     Continue home care nurse for wound care     Standing Status:   Future     Standing Expiration Date:   3/11/2023    Wound cleansing and dressings     Right and Left Leg wounds    Wash your hands with soap and water  Remove old dressing, discard into plastic bag and place in trash  Cleanse the wound with Hibiclens (Chlorhexadine wash) can get at your local pharmacy over the counter prior to applying a clean dressing  Do not use tissue or cotton balls  Do not scrub the wound  Pat dry using gauze  Shower no can not get legs wet in shower      Apply silver alginate to wound (maxsorb AG used at wound center today)   Cover with Absorptive dressing (optilock used today at wound center)  Secure with nathalie and tape    Place Spandigrip F for light compression     Change dressing every other day     Standing Status:   Future     Standing Expiration Date:   3/11/2023    Debridement     This order was created via procedure documentation    Debridement     This order was created via procedure documentation       Candice Gardiner PA-C, [facility]      Portions of the record may have been created with voice recognition software  Occasional wrong word or "sound alike" substitutions may have occurred due to the inherent limitations of voice recognition software   Read the chart carefully and recognize, using context, where substitutions have occurred

## 2022-03-11 NOTE — PATIENT INSTRUCTIONS
Orders Placed This Encounter   Procedures    Wound home care     Continue home care nurse for wound care     Standing Status:   Future     Standing Expiration Date:   3/11/2023    Wound cleansing and dressings     Right and Left Leg wounds    Wash your hands with soap and water  Remove old dressing, discard into plastic bag and place in trash  Cleanse the wound with Hibiclens (Chlorhexadine wash) can get at your local pharmacy over the counter prior to applying a clean dressing  Do not use tissue or cotton balls  Do not scrub the wound  Pat dry using gauze  Shower no can not get legs wet in shower      Apply silver alginate to wound (maxsorb AG used at wound center today)     Cover with Absorptive dressing (optilock used today at wound center)  Secure with nathalie and tape    Place Spandigrip F for light compression     Change dressing every other day     Standing Status:   Future     Standing Expiration Date:   3/11/2023

## 2022-03-11 NOTE — LETTER
[address]  [address] [facility] [address]  Phone#  [phone]  Fax#  [phone]    Patient:  Ellie Sanabria  YOB: 1945  Phone:  [phone]  Date of Visit:  3/11/2022    Orders Placed This Encounter   Procedures    Wound home care     Continue home care nurse for wound care     Standing Status:   Future     Standing Expiration Date:   3/11/2023    Wound cleansing and dressings     Right and Left Leg wounds    Wash your hands with soap and water  Remove old dressing, discard into plastic bag and place in trash  Cleanse the wound with Hibiclens (Chlorhexadine wash) can get at your local pharmacy over the counter prior to applying a clean dressing  Do not use tissue or cotton balls  Do not scrub the wound  Pat dry using gauze  Shower no can not get legs wet in shower      Apply silver alginate to wound (maxsorb AG used at wound center today)     Cover with Absorptive dressing (optilock used today at wound center)  Secure with nathalie and tape    Place Spandigrip F for light compression     Change dressing every other day     Standing Status:   Future     Standing Expiration Date:   3/11/2023         Electronically signed by Jamel Rivera PA-C

## 2022-03-16 ENCOUNTER — OFFICE VISIT (OUTPATIENT)
Dept: FAMILY MEDICINE CLINIC | Facility: CLINIC | Age: 77
End: 2022-03-16
Payer: COMMERCIAL

## 2022-03-16 VITALS
DIASTOLIC BLOOD PRESSURE: 70 MMHG | SYSTOLIC BLOOD PRESSURE: 130 MMHG | HEART RATE: 91 BPM | TEMPERATURE: 97.7 F | OXYGEN SATURATION: 98 %

## 2022-03-16 DIAGNOSIS — L97.922 ULCERS OF BOTH LOWER EXTREMITIES WITH FAT LAYER EXPOSED (HCC): Primary | ICD-10-CM

## 2022-03-16 DIAGNOSIS — L97.912 ULCERS OF BOTH LOWER EXTREMITIES WITH FAT LAYER EXPOSED (HCC): Primary | ICD-10-CM

## 2022-03-16 DIAGNOSIS — G89.29 OTHER CHRONIC PAIN: ICD-10-CM

## 2022-03-16 DIAGNOSIS — F11.20 CONTINUOUS OPIOID DEPENDENCE (HCC): ICD-10-CM

## 2022-03-16 DIAGNOSIS — D47.3 ESSENTIAL (HEMORRHAGIC) THROMBOCYTHEMIA (HCC): ICD-10-CM

## 2022-03-16 PROCEDURE — 99495 TRANSJ CARE MGMT MOD F2F 14D: CPT | Performed by: NURSE PRACTITIONER

## 2022-03-16 PROCEDURE — 1111F DSCHRG MED/CURRENT MED MERGE: CPT | Performed by: NURSE PRACTITIONER

## 2022-03-16 RX ORDER — GABAPENTIN 300 MG/1
600 CAPSULE ORAL
Qty: 90 CAPSULE | Refills: 1 | Status: SHIPPED | OUTPATIENT
Start: 2022-03-16 | End: 2022-05-13 | Stop reason: SDUPTHER

## 2022-03-16 NOTE — ASSESSMENT & PLAN NOTE
Continue with wound care  Continue keeping dressing clean dry  Keep legs elevated  Advised to do some foot exercises to maintain circulation

## 2022-03-16 NOTE — PROGRESS NOTES
Assessment/Plan:     Ulcers of both lower extremities with fat layer exposed ([facility] [address] )  Continue with wound care  Continue keeping dressing clean dry  Keep legs elevated  Advised to do some foot exercises to maintain circulation  Other chronic pain  Bilateral foot pain, spasms  Reports she has been taking the gabapentin at night and that has been helping  Medication refilled  Discussed nonpharmacological interventions for pain management  Problem List Items Addressed This Visit        Other    Continuous opioid dependence ([facility] [address] )    Ulcers of both lower extremities with fat layer exposed ([facility] [address] ) - Primary     Continue with wound care  Continue keeping dressing clean dry  Keep legs elevated  Advised to do some foot exercises to maintain circulation  Other chronic pain     Bilateral foot pain, spasms  Reports she has been taking the gabapentin at night and that has been helping  Medication refilled  Discussed nonpharmacological interventions for pain management  Relevant Medications    gabapentin (NEURONTIN) 300 mg capsule      Other Visit Diagnoses     Essential (hemorrhagic) thrombocythemia ([facility] [address] )                Subjective:      Patient ID: Sly Helton is a 67 y o  female  Patient is here for follow up after being admitted to the hospital for leg wound  Patient has been following by wound care  Has a dressing change by nurses  Leg is looking generally better  Mild swelling  Patient states the gabapentin has been helping with the pain and helping her get some rest   No other concerns        The following portions of the patient's history were reviewed and updated as appropriate: allergies, current medications, past family history, past medical history, past social history, past surgical history and problem list     Review of Systems      Objective:      /70   Pulse 91   Temp 97 7 °F (36 5 °C) (Tympanic)   SpO2 98%          Physical Exam  Constitutional: Appearance: Normal appearance  HENT:      Head: Normocephalic and atraumatic  Right Ear: Tympanic membrane normal       Left Ear: Tympanic membrane normal       Nose: Nose normal    Eyes:      Pupils: Pupils are equal, round, and reactive to light  Cardiovascular:      Rate and Rhythm: Normal rate and regular rhythm  Pulses: Normal pulses  Heart sounds: Normal heart sounds  Pulmonary:      Effort: Pulmonary effort is normal       Breath sounds: Normal breath sounds  Musculoskeletal:      Cervical back: Normal range of motion  Right lower leg: Edema (+1) present  Left lower leg: Edema present  Skin:     Findings: Erythema present  Comments: maximus foot wound  Dressing is intact  No drainage noted  Mild edema  No redness  Neurological:      General: No focal deficit present  Mental Status: She is alert and oriented to person, place, and time  Psychiatric:         Mood and Affect: Mood normal          Behavior: Behavior normal          Thought Content:  Thought content normal          Judgment: Judgment normal

## 2022-03-16 NOTE — ASSESSMENT & PLAN NOTE
Bilateral foot pain, spasms  Reports she has been taking the gabapentin at night and that has been helping  Medication refilled  Discussed nonpharmacological interventions for pain management

## 2022-03-17 ENCOUNTER — TELEPHONE (OUTPATIENT)
Dept: FAMILY MEDICINE CLINIC | Facility: CLINIC | Age: 77
End: 2022-03-17

## 2022-03-17 NOTE — TELEPHONE ENCOUNTER
Patients  Hugh Rob called to ask if you can order a bedside commode for the patient? The one she had is too old now and she would like a new one please   It needs to be faxed to [facility]

## 2022-03-18 ENCOUNTER — OFFICE VISIT (OUTPATIENT)
Dept: WOUND CARE | Facility: CLINIC | Age: 77
End: 2022-03-18
Payer: COMMERCIAL

## 2022-03-18 VITALS
RESPIRATION RATE: 18 BRPM | SYSTOLIC BLOOD PRESSURE: 165 MMHG | HEART RATE: 83 BPM | DIASTOLIC BLOOD PRESSURE: 76 MMHG | TEMPERATURE: 98.5 F

## 2022-03-18 DIAGNOSIS — I73.9 PERIPHERAL VASCULAR DISEASE OF EXTREMITY (HCC): ICD-10-CM

## 2022-03-18 DIAGNOSIS — E43 SEVERE MALNUTRITION (HCC): ICD-10-CM

## 2022-03-18 DIAGNOSIS — S81.802A OPEN WOUND OF LEFT LOWER LEG, INITIAL ENCOUNTER: Primary | ICD-10-CM

## 2022-03-18 DIAGNOSIS — S81.801A OPEN WOUND OF RIGHT LOWER LEG, INITIAL ENCOUNTER: ICD-10-CM

## 2022-03-18 PROCEDURE — 11045 DBRDMT SUBQ TISS EACH ADDL: CPT | Performed by: ORTHOPAEDIC SURGERY

## 2022-03-18 PROCEDURE — 11042 DBRDMT SUBQ TIS 1ST 20SQCM/<: CPT | Performed by: ORTHOPAEDIC SURGERY

## 2022-03-18 RX ORDER — LIDOCAINE HYDROCHLORIDE 40 MG/ML
5 SOLUTION TOPICAL ONCE
Status: COMPLETED | OUTPATIENT
Start: 2022-03-18 | End: 2022-03-18

## 2022-03-18 RX ADMIN — LIDOCAINE HYDROCHLORIDE 5 ML: 40 SOLUTION TOPICAL at 14:06

## 2022-03-18 NOTE — PATIENT INSTRUCTIONS
Orders Placed This Encounter   Procedures    Wound cleansing and dressings     Wound care Frequency changed to daily and  new Wound orders        Wound cleansing and dressings      Right and Left Leg wounds     Wash your hands with soap and water  Remove old dressing, discard into plastic bag and place in trash  Will do a dakins wet to dry   Do not use tissue or cotton balls  Do not scrub the wound   Pat dry using gauze      Shower no can not get legs wet in shower        Apply Dakins wet to dry gauze onto wound  ( DAKINS AT [employer]) cover with absorptive dressing   Secure with nathalie and tape     Place Spandigrip F for light compression      Change dressing every day            Wound home care      Continue home care nurse for wound care          Standing Status:   Future     Standing Expiration Date:   3/18/2023

## 2022-03-18 NOTE — LETTER
[address]  [address] [place] [address]  Phone#  [phone]  Fax#  [phone]    Patient:  Chuy Kemp  YOB: 1945  Phone:  [phone]  Date of Visit:  3/18/2022    Orders Placed This Encounter   Procedures    Wound cleansing and dressings     Wound care Frequency changed to daily and  new Wound orders        Wound cleansing and dressings      Right and Left Leg wounds     Wash your hands with soap and water  Remove old dressing, discard into plastic bag and place in trash  Will do a dakins wet to dry   Do not use tissue or cotton balls  Do not scrub the wound   Pat dry using gauze      Shower no can not get legs wet in shower        Apply Dakins wet to dry gauze onto wound  ( DAKINS AT [employer]) cover with absorptive dressing   Secure with nathalie and tape     Place Spandigrip F for light compression      Change dressing every day            Wound home care      Continue home care nurse for wound care          Standing Status:   Future     Standing Expiration Date:   3/18/2023         Electronically signed by Srini Up PA-C

## 2022-03-18 NOTE — PROGRESS NOTES
Patient ID: Meena Jerry is a 67 y o  female Date of Birth 1945       Chief Complaint   Patient presents with    Follow Up Wound Care Visit     open wound bilateral legs       Allergies:  Aspirin and Warfarin    Diagnosis:   Diagnosis ICD-10-CM Associated Orders   1  Open wound of left lower leg, initial encounter  S81 802A lidocaine (XYLOCAINE) 4 % topical solution 5 mL     Wound cleansing and dressings     Debridement   2  Open wound of right lower leg, initial encounter  S81 801A lidocaine (XYLOCAINE) 4 % topical solution 5 mL     Wound cleansing and dressings     Debridement   3  Peripheral vascular disease of extremity ([facility])  I73 9    4  Severe malnutrition ([facility] [address] )  E43         Assessment and Plan :  · BLE Open wounds with adherent slough and necrotic tissue  Pt was not managing wounds with Hibiclens soak nor changing wounds daily  · Debrided as below  · Changed wound management to daily Dakins wet to dry dressing changes, see wound orders below  · Continue compression with Tubigrip  · Do not wet wounds  · Counseled on importance of frequent elevation of leg  · Counseled on adequate protein intake, 3-4 servings per day  · Followup in 1 week or call sooner with questions or concerns      Subjective:   3/11/22: Patient presents for followup of BLE wounds  Since last visit pt was admitted to hospital for BLE Cellulitis  X-rays with no evidence of osteomyelitis and LE duplex with no DVT  Pt was positive for MRSA, Group B Strep and Achromobacter xylosoxidans  Since then pt was treated with 2 courses of antibiotics: IV Vanco and transitioned to PO Bactrim for another 6 days  Pt has been using Silver alginate on the wound and Tubi for compression  Pt admits she has not been changing the dressing daily nor has she been doing the Hibiclens soaks  Pt with persistent drainage  Denies any fevers, chills, N/V, CP or SOB          The following portions of the patient's history were reviewed and updated as appropriate:   Patient Active Problem List   Diagnosis    Hammer toe    Combined hyperlipidemia    Essential (primary) hypertension    Foot pain    IFG (impaired fasting glucose)    Senile osteoporosis    Encounter to establish care    Open wound of left lower leg    Open wound of right lower leg    Cellulitis of left foot    Microcytic anemia    Acute blood loss anemia    Severe malnutrition (HCC)    Peripheral vascular disease of extremity (HCC)    Need for transfer to another facility    Continuous opioid dependence ([facility] [address] )    Wound cellulitis    Thrombocytosis    Ulcers of both lower extremities with fat layer exposed ([facility] [address] )    Other chronic pain     Past Medical History:   Diagnosis Date    Hyperlipidemia     Hypertension     Osteoporosis     Shingles      Past Surgical History:   Procedure Laterality Date    CATARACT EXTRACTION Bilateral     FOOT SURGERY Right     reconstruction    TOTAL HIP ARTHROPLASTY Bilateral      Family History   Family history unknown: Yes     Social History     Socioeconomic History    Marital status:      Spouse name: None    Number of children: None    Years of education: None    Highest education level: None   Occupational History    None   Tobacco Use    Smoking status: Former Smoker    Smokeless tobacco: Never Used    Tobacco comment: quit 50 years ago   Vaping Use    Vaping Use: Never used   Substance and Sexual Activity    Alcohol use:  Yes     Alcohol/week: 7 0 standard drinks     Types: 7 Glasses of wine per week    Drug use: Never    Sexual activity: None   Other Topics Concern    None   Social History Narrative    None     Social Determinants of Health     Financial Resource Strain: Not on file   Food Insecurity: No Food Insecurity    Worried About Running Out of Food in the Last Year: Never true    Marcio of Food in the Last Year: Never true   Transportation Needs: No Transportation Needs    Lack of Transportation (Medical): No    Lack of Transportation (Non-Medical): No   Physical Activity: Not on file   Stress: Not on file   Social Connections: Not on file   Intimate Partner Violence: Not on file   Housing Stability: Low Risk     Unable to Pay for Housing in the Last Year: No    Number of Places Lived in the Last Year: 1    Unstable Housing in the Last Year: No       Current Outpatient Medications:     calcium carbonate-vitamin D (OSCAL-D) 500 mg-200 units per tablet, Take 1 tablet by mouth daily, Disp: , Rfl:     Cyanocobalamin (B-12) 1000 MCG CAPS, Take 1 capsule by mouth in the morning  , Disp: , Rfl:     gabapentin (NEURONTIN) 300 mg capsule, Take 2 capsules (600 mg total) by mouth daily at bedtime, Disp: 90 capsule, Rfl: 1    Incontinence Supply Disposable (BRIEFS OVERNIGHT MEDIUM) MISC, by Does not apply route 3 (three) times a day, Disp: 30 each, Rfl: 3    Misc  Devices (Commode Bedside/Back) MISC, Use 1 Device 4 (four) times a day as needed (ambulatory dysfunction), Disp: 1 each, Rfl: 0    Wound Dressings (Adaptic Non-Adhering Dressing) PADS, Apply 30 each topically 2 (two) times a day, Disp: 30 each, Rfl: 0  No current facility-administered medications for this visit  Review of Systems   Constitutional: Negative for appetite change, chills, fatigue, fever and unexpected weight change  HENT: Negative for congestion, hearing loss and postnasal drip  Respiratory: Negative for cough and shortness of breath  Cardiovascular: Positive for leg swelling  Musculoskeletal: Positive for gait problem (wheelchair)  Skin: Positive for wound (BLE)  Negative for rash  Neurological: Negative for numbness  Hematological: Does not bruise/bleed easily  Psychiatric/Behavioral: Negative  Objective:  /76   Pulse 83   Temp 98 5 °F (36 9 °C)   Resp 18   Pain Score: 0-No pain     Physical Exam  Vitals reviewed  Constitutional:       General: She is not in acute distress       Appearance: Normal appearance  She is well-developed  She is obese  HENT:      Head: Normocephalic and atraumatic  Cardiovascular:      Rate and Rhythm: Normal rate  Pulmonary:      Effort: Pulmonary effort is normal    Musculoskeletal:         General: No deformity  Right lower leg: Edema present  Left lower leg: Edema present  Skin:     General: Skin is warm and dry  Findings: Wound (BLE) present  Comments: +Biofilm, yellow adherent slough and necrotic tissue  See wound assessment   Neurological:      General: No focal deficit present  Mental Status: She is alert and oriented to person, place, and time  Gait: Gait abnormal    Psychiatric:         Mood and Affect: Mood and affect normal          Behavior: Behavior normal  Behavior is cooperative  Wound 02/28/22 Leg Right;Medial (Active)   Wound Image Images linked 03/18/22 1424   Wound Description Pink;Yellow 03/18/22 1404   Jessica-wound Assessment Pink;Dry;Fragile 03/18/22 1404   Wound Length (cm) 10 5 cm 03/18/22 1404   Wound Width (cm) 6 cm 03/18/22 1404   Wound Depth (cm) 0 1 cm 03/18/22 1404   Wound Surface Area (cm^2) 63 cm^2 03/18/22 1404   Wound Volume (cm^3) 6 3 cm^3 03/18/22 1404   Calculated Wound Volume (cm^3) 6 3 cm^3 03/18/22 1404   Change in Wound Size % -82 61 03/18/22 1404   Drainage Amount Moderate 03/18/22 1404   Drainage Description Serosanguineous 03/18/22 1404   Non-staged Wound Description Full thickness 03/18/22 1404   Dressing Status Intact 03/18/22 1404       Wound 02/28/22 Leg Left;Medial (Active)   Wound Image Images linked 03/18/22 1420   Wound Description Pink;Slough; Yellow; Tan 03/18/22 1403   Jessica-wound Assessment Pink;Dry;Fragile 03/18/22 1403   Wound Length (cm) 14 cm 03/18/22 1403   Wound Width (cm) 8 4 cm 03/18/22 1403   Wound Depth (cm) 0 1 cm 03/18/22 1403   Wound Surface Area (cm^2) 117 6 cm^2 03/18/22 1403   Wound Volume (cm^3) 11 76 cm^3 03/18/22 1403   Calculated Wound Volume (cm^3) 11 76 cm^3 03/18/22 1403   Change in Wound Size % 23 64 03/18/22 1403   Drainage Amount Large 03/18/22 1403   Drainage Description Serosanguineous; Dooley 03/18/22 1403   Non-staged Wound Description Full thickness 03/18/22 1403   Dressing Status Intact 03/18/22 1403       Debridement   Wound 02/28/22 Leg Right;[facility] [place]    Universal Protocol:  Consent: Verbal consent obtained  Written consent obtained  Risks and benefits: risks, benefits and alternatives were discussed  Consent given by: patient  Time out: Immediately prior to procedure a "time out" was called to verify the correct patient, procedure, equipment, support staff and site/side marked as required  Patient understanding: patient states understanding of the procedure being performed  Patient identity confirmed: verbally with patient      Performed by: PA  Debridement type: surgical  Level of debridement: subcutaneous tissue  Pain control: lidocaine 4%  Post-debridement measurements  Length (cm): 10 5  Width (cm): 6  Depth (cm): 0 2  Percent debrided: 100%  Surface Area (cm^2): 63  Area debrided (cm^2): 63  Volume (cm^3): 12 6  Tissue and other material debrided: subcutaneous tissue  Devitalized tissue debrided: biofilm, exudate, fibrin, necrotic debris and slough  Instrument(s) utilized: curette  Bleeding: small  Hemostasis obtained with: pressure  Procedural pain (0-10): 0  Post-procedural pain: 0   Response to treatment: procedure was tolerated well    Debridement   Wound 02/28/22 Leg Left;[facility] [place]    Universal Protocol:  Consent: Verbal consent obtained  Written consent obtained  Risks and benefits: risks, benefits and alternatives were discussed  Consent given by: patient  Time out: Immediately prior to procedure a "time out" was called to verify the correct patient, procedure, equipment, support staff and site/side marked as required    Patient understanding: patient states understanding of the procedure being performed  Patient identity confirmed: verbally with patient      Performed by: PA  Debridement type: surgical  Level of debridement: subcutaneous tissue  Pain control: lidocaine 4%  Post-debridement measurements  Length (cm): 14  Width (cm): 8 4  Depth (cm): 0 2  Percent debrided: 100%  Surface Area (cm^2): 117 6  Area debrided (cm^2): 117 6  Volume (cm^3): 23 52  Tissue and other material debrided: subcutaneous tissue  Devitalized tissue debrided: biofilm, exudate, fibrin, necrotic debris and slough  Instrument(s) utilized: curette  Bleeding: small  Hemostasis obtained with: pressure  Procedural pain (0-10): 0  Post-procedural pain: 0   Response to treatment: procedure was tolerated well                       Results from last 6 Months   Lab Units 03/01/22  1443   WOUND CULTURE  3+ Growth of Methicillin Resistant Staphylococcus aureus*  3+ Growth of   4+ Growth of Beta Hemolytic Streptococcus Group G*  3+ Growth of Methicillin Resistant Staphylococcus aureus*  2+ Growth of Achromobacter xylosoxidans*  3+ Growth of          Wound Instructions:  Orders Placed This Encounter   Procedures    Wound cleansing and dressings     Wound care Frequency changed to daily and  new Wound orders        Wound cleansing and dressings      Right and Left Leg wounds     Wash your hands with soap and water  Remove old dressing, discard into plastic bag and place in trash  Will do a dakins wet to dry   Do not use tissue or cotton balls  Do not scrub the wound   Pat dry using gauze      Shower no can not get legs wet in shower        Apply Dakins wet to dry gauze onto wound  ( DAKINS AT [facility]) cover with absorptive dressing   Secure with nathalie and tape     Place Spandigrip F for light compression      Change dressing every day            Wound home care      Continue home care nurse for wound care          Standing Status:   Future     Standing Expiration Date:   3/18/2023    Debridement     This order was created via procedure documentation    Debridement This order was created via procedure documentation       Solomon Montalvo PA-C, [facility]      Portions of the record may have been created with voice recognition software  Occasional wrong word or "sound alike" substitutions may have occurred due to the inherent limitations of voice recognition software  Read the chart carefully and recognize, using context, where substitutions have occurred

## 2022-03-22 ENCOUNTER — TELEPHONE (OUTPATIENT)
Dept: WOUND CARE | Facility: CLINIC | Age: 77
End: 2022-03-22

## 2022-03-22 NOTE — TELEPHONE ENCOUNTER
Returned message from [address] [place] [address] at [facility]  Spoke with Vivi Scot who stated Nilton Adkins did not have the Dakin's at her house due to not being able to afford it until tomorrow  The same dressings have been on since the Friday visit here  Instructed Heather to use Hibiclens and calcium alginate which is available in the house if dakins is still not available by next visit  Late Note-  Spoke with the patient and the grand daughter about the importance of obtaining the Dakin's solution as soon as possible  Grand daughter stated she would pick it up if its available and instructed her to use Hibiclens antibacterial soap prior to calcium alginate if the Dakin's can not be obtained  Stressed the importance of changinging the bandage to prevent infection  Called Gwendolyn Tabor yesterday at 1600 and called another script in

## 2022-03-24 NOTE — TELEPHONE ENCOUNTER
T/c from Eryn Samuel - (pts )     Requesting to fax recently placed order for a bedside commode for the patient to [facility], stating [facility] did not receive our order  Eryn Samuel is requesting [facility] and our practice  to f/u on this with pt  Order printed, faxed with face sheet to [facility]  Confirmation fax received  Will follow up on this tomorrow  Eryn Samuel # [phone]

## 2022-03-25 ENCOUNTER — TELEPHONE (OUTPATIENT)
Dept: WOUND CARE | Facility: CLINIC | Age: 77
End: 2022-03-25

## 2022-03-25 NOTE — TELEPHONE ENCOUNTER
T/C received from patient to cancel her appointment for today because of transportation  Patient stated she did not  the dakins solution since last week and she wasn't sure if she would be able to get it today either  Patient stated she is using Hibiclens and calcium alginate at home daily  Instructed the patient to  the dakin's solution ASAP to prevent her wounds from getting infected and possibly getting admitted back to the hospital  The patient verbalized understanding

## 2022-03-25 NOTE — TELEPHONE ENCOUNTER
Spoke to Elouise Baumgarten @ [facility] this morning, she confirmed our order was received last night and they will follow up with pt shortly  I gave them Leeann's phone # to make sure its been taking care of

## 2022-03-30 ENCOUNTER — TELEPHONE (OUTPATIENT)
Dept: FAMILY MEDICINE CLINIC | Facility: CLINIC | Age: 77
End: 2022-03-30

## 2022-03-30 NOTE — TELEPHONE ENCOUNTER
Please redo the order for the bedside commode  It was put under medications like it was suppose to be sent to the pharmacy    It needs to be done under other orders    Please fax to [facility] [phone]

## 2022-04-08 ENCOUNTER — OFFICE VISIT (OUTPATIENT)
Dept: WOUND CARE | Facility: CLINIC | Age: 77
End: 2022-04-08
Payer: COMMERCIAL

## 2022-04-08 VITALS
HEART RATE: 88 BPM | DIASTOLIC BLOOD PRESSURE: 86 MMHG | TEMPERATURE: 98.3 F | RESPIRATION RATE: 18 BRPM | SYSTOLIC BLOOD PRESSURE: 125 MMHG

## 2022-04-08 DIAGNOSIS — E43 SEVERE MALNUTRITION (HCC): ICD-10-CM

## 2022-04-08 DIAGNOSIS — S81.802A OPEN WOUND OF LEFT LOWER LEG, INITIAL ENCOUNTER: Primary | ICD-10-CM

## 2022-04-08 DIAGNOSIS — I73.9 PERIPHERAL VASCULAR DISEASE OF EXTREMITY (HCC): ICD-10-CM

## 2022-04-08 DIAGNOSIS — L03.115 BILATERAL LOWER LEG CELLULITIS: ICD-10-CM

## 2022-04-08 DIAGNOSIS — L03.116 BILATERAL LOWER LEG CELLULITIS: ICD-10-CM

## 2022-04-08 DIAGNOSIS — S81.801A OPEN WOUND OF RIGHT LOWER LEG, INITIAL ENCOUNTER: ICD-10-CM

## 2022-04-08 PROCEDURE — 11045 DBRDMT SUBQ TISS EACH ADDL: CPT | Performed by: ORTHOPAEDIC SURGERY

## 2022-04-08 PROCEDURE — 11042 DBRDMT SUBQ TIS 1ST 20SQCM/<: CPT | Performed by: ORTHOPAEDIC SURGERY

## 2022-04-08 RX ORDER — LIDOCAINE HYDROCHLORIDE 40 MG/ML
5 SOLUTION TOPICAL ONCE
Status: COMPLETED | OUTPATIENT
Start: 2022-04-08 | End: 2022-04-08

## 2022-04-08 RX ADMIN — LIDOCAINE HYDROCHLORIDE 5 ML: 40 SOLUTION TOPICAL at 09:16

## 2022-04-08 NOTE — PROGRESS NOTES
Patient ID: Kevin Mcginnis is a 67 y o  female Date of Birth 1945       Chief Complaint   Patient presents with    Follow Up Wound Care Visit     right and left lower leg wounds       Allergies:  Aspirin and Warfarin    Diagnosis:   Diagnosis ICD-10-CM Associated Orders   1  Open wound of left lower leg, initial encounter  S81 802A lidocaine (XYLOCAINE) 4 % topical solution 5 mL     Wound cleansing and dressings   2  Open wound of right lower leg, initial encounter  S81 801A lidocaine (XYLOCAINE) 4 % topical solution 5 mL     Wound cleansing and dressings   3  Peripheral vascular disease of extremity ([facility])  I73 9    4  Severe malnutrition ([facility] [address] )  E43    5  Bilateral lower leg cellulitis  L03 116     L03 115         Assessment and Plan :  · BLE Open wounds with adherent slough and necrotic tissue  Pt is not managing wounds as directed  · Debrided as below  · Please manage wounds with Hibiclens wash and daily Dakins wet to dry dressing changes, see wound orders below  · Continue compression with Tubigrip  · Do not wet wounds  · Counseled on importance of frequent elevation of leg  · Counseled on adequate protein intake, 3-4 servings per day  · Followup in 1 week or call sooner with questions or concerns      Subjective:   Patient presents for followup of BLE wounds  Pt admits she did not  the Dakins therefore the nurses have been doing wet to dry dressing changes with Hibiclens  The instructions were to wash with Hibiclens and do Dakins wet to dry  Pt also admits she has not been wearing Tubigrip for compression  Pt with persistent drainage  Denies any fevers, chills, N/V, CP or SOB          The following portions of the patient's history were reviewed and updated as appropriate:   Patient Active Problem List   Diagnosis    Hammer toe    Combined hyperlipidemia    Essential (primary) hypertension    Foot pain    IFG (impaired fasting glucose)    Senile osteoporosis    Encounter to establish care    Open wound of left lower leg    Open wound of right lower leg    Cellulitis of left foot    Microcytic anemia    Acute blood loss anemia    Severe malnutrition (HCC)    Peripheral vascular disease of extremity (HCC)    Need for transfer to another facility    [facility]    Wound cellulitis    Thrombocytosis    Ulcers of both lower extremities with fat layer exposed ([facility]    Other chronic pain     Past Medical History:   Diagnosis Date    Hyperlipidemia     Hypertension     Osteoporosis     Shingles      Past Surgical History:   Procedure Laterality Date    CATARACT EXTRACTION Bilateral     FOOT SURGERY Right     reconstruction    TOTAL HIP ARTHROPLASTY Bilateral      Family History   Family history unknown: Yes     Social History     Socioeconomic History    Marital status:      Spouse name: None    Number of children: None    Years of education: None    Highest education level: None   Occupational History    None   Tobacco Use    Smoking status: Former Smoker    Smokeless tobacco: Never Used    Tobacco comment: quit 50 years ago   Vaping Use    Vaping Use: Never used   Substance and Sexual Activity    Alcohol use: Yes     Alcohol/week: 7 0 standard drinks     Types: 7 Glasses of wine per week    Drug use: Never    Sexual activity: None   Other Topics Concern    None   Social History Narrative    None     Social Determinants of Health     Financial Resource Strain: Not on file   Food Insecurity: No Food Insecurity    Worried About Running Out of Food in the Last Year: Never true    Marcio of Food in the Last Year: Never true   Transportation Needs: No Transportation Needs    Lack of Transportation (Medical): No    Lack of Transportation (Non-Medical):  No   Physical Activity: Not on file   Stress: Not on file   Social Connections: Not on file   Intimate Partner Violence: Not on file   Housing Stability: Low Risk     Unable to Pay for Housing in the Last Year: No    Number of Places Lived in the Last Year: 1    Unstable Housing in the Last Year: No       Current Outpatient Medications:     calcium carbonate-vitamin D (OSCAL-D) 500 mg-200 units per tablet, Take 1 tablet by mouth daily, Disp: , Rfl:     Cyanocobalamin (B-12) 1000 MCG CAPS, Take 1 capsule by mouth in the morning  , Disp: , Rfl:     gabapentin (NEURONTIN) 300 mg capsule, Take 2 capsules (600 mg total) by mouth daily at bedtime, Disp: 90 capsule, Rfl: 1    Incontinence Supply Disposable (BRIEFS OVERNIGHT MEDIUM) MISC, by Does not apply route 3 (three) times a day, Disp: 30 each, Rfl: 3    Misc  Devices (Commode Bedside/Back) MISC, Use 1 Device 4 (four) times a day as needed (ambulatory dysfunction), Disp: 1 each, Rfl: 0    Wound Dressings (Adaptic Non-Adhering Dressing) PADS, Apply 30 each topically 2 (two) times a day, Disp: 30 each, Rfl: 0  No current facility-administered medications for this visit  Review of Systems   Constitutional: Negative for appetite change, chills, fatigue, fever and unexpected weight change  HENT: Negative for congestion, hearing loss and postnasal drip  Respiratory: Negative for cough and shortness of breath  Cardiovascular: Positive for leg swelling  Musculoskeletal: Positive for gait problem (wheelchair)  Skin: Positive for wound (BLE)  Negative for rash  Neurological: Negative for numbness  Hematological: Does not bruise/bleed easily  Psychiatric/Behavioral: Negative  Objective:  /86   Pulse 88   Temp 98 3 °F (36 8 °C)   Resp 18   Pain Score: 0-No pain     Physical Exam  Vitals reviewed  Constitutional:       General: She is not in acute distress  Appearance: Normal appearance  She is well-developed  She is obese  HENT:      Head: Normocephalic and atraumatic  Cardiovascular:      Rate and Rhythm: Normal rate     Pulmonary:      Effort: Pulmonary effort is normal    Musculoskeletal: General: No deformity  Right lower leg: Edema present  Left lower leg: Edema present  Skin:     General: Skin is warm and dry  Findings: Wound (BLE) present  Comments: yellow adherent slough, +moderate serous drainage and necrotic tissue  See wound assessment   Neurological:      General: No focal deficit present  Mental Status: She is alert and oriented to person, place, and time  Gait: Gait abnormal    Psychiatric:         Mood and Affect: Mood and affect normal          Behavior: Behavior normal  Behavior is cooperative  Wound 02/28/22 Leg Right;Medial (Active)   Wound Image Images linked 04/08/22 0925   Wound Description Pink;Yellow; Tan 04/08/22 0912   Jessica-wound Assessment Pink;Dry;Fragile 04/08/22 0912   Wound Length (cm) 10 5 cm 04/08/22 0912   Wound Width (cm) 5 8 cm 04/08/22 0912   Wound Depth (cm) 0 1 cm 04/08/22 0912   Wound Surface Area (cm^2) 60 9 cm^2 04/08/22 0912   Wound Volume (cm^3) 6 09 cm^3 04/08/22 0912   Calculated Wound Volume (cm^3) 6 09 cm^3 04/08/22 0912   Change in Wound Size % -76 52 04/08/22 0912   Drainage Amount Moderate 04/08/22 0912   Drainage Description Serosanguineous 04/08/22 0912   Non-staged Wound Description Full thickness 04/08/22 0912   Dressing Status Intact 04/08/22 0912       Wound 02/28/22 Leg Left;Medial (Active)   Wound Image Images linked 04/08/22 0930   Wound Description Yellow;Tan;Pink 04/08/22 0911   Jessica-wound Assessment Pink;Dry;Fragile 04/08/22 0911   Wound Length (cm) 13 cm 04/08/22 0911   Wound Width (cm) 8 4 cm 04/08/22 0911   Wound Depth (cm) 0 1 cm 04/08/22 0911   Wound Surface Area (cm^2) 109 2 cm^2 04/08/22 0911   Wound Volume (cm^3) 10 92 cm^3 04/08/22 0911   Calculated Wound Volume (cm^3) 10 92 cm^3 04/08/22 0911   Change in Wound Size % 29 09 04/08/22 0911   Drainage Amount Moderate 04/08/22 0911   Drainage Description Serosanguineous; Tan 04/08/22 0911   Non-staged Wound Description Full thickness 04/08/22 0911       Debridement   Wound 02/28/22 Leg Right;Medial    Universal Protocol:  Consent: Verbal consent obtained  Written consent obtained  Risks and benefits: risks, benefits and alternatives were discussed  Consent given by: patient  Time out: Immediately prior to procedure a "time out" was called to verify the correct patient, procedure, equipment, support staff and site/side marked as required  Patient understanding: patient states understanding of the procedure being performed  Patient identity confirmed: verbally with patient      Performed by: PA  Debridement type: surgical  Level of debridement: subcutaneous tissue  Pain control: lidocaine 4%  Post-debridement measurements  Length (cm): 10 5  Width (cm): 5 8  Depth (cm): 0 2  Percent debrided: 100%  Surface Area (cm^2): 60 9  Area debrided (cm^2): 60 9  Volume (cm^3): 12 18  Tissue and other material debrided: subcutaneous tissue  Devitalized tissue debrided: biofilm, exudate, fibrin, necrotic debris and slough  Instrument(s) utilized: curette and blade  Bleeding: small  Hemostasis obtained with: pressure  Procedural pain (0-10): 0  Post-procedural pain: 0   Response to treatment: procedure was tolerated well    Debridement   Wound 02/28/22 Leg Left;Medial    Universal Protocol:  Consent: Verbal consent obtained  Written consent obtained  Risks and benefits: risks, benefits and alternatives were discussed  Consent given by: patient  Time out: Immediately prior to procedure a "time out" was called to verify the correct patient, procedure, equipment, support staff and site/side marked as required    Patient understanding: patient states understanding of the procedure being performed  Patient identity confirmed: verbally with patient      Performed by: PA  Debridement type: surgical  Level of debridement: subcutaneous tissue  Pain control: lidocaine 4%  Post-debridement measurements  Length (cm): 13  Width (cm): 8 4  Depth (cm): 0 2  Percent debrided: 100%  Surface Area (cm^2): 109 2  Area debrided (cm^2): 109 2  Volume (cm^3): 21 84  Tissue and other material debrided: subcutaneous tissue  Devitalized tissue debrided: biofilm, exudate, fibrin, necrotic debris and slough  Instrument(s) utilized: curette and blade  Bleeding: small  Hemostasis obtained with: pressure  Procedural pain (0-10): 0  Post-procedural pain: 0   Response to treatment: procedure was tolerated well                 Results from last 6 Months   Lab Units 03/01/22  1443   WOUND CULTURE  3+ Growth of Methicillin Resistant Staphylococcus aureus*  3+ Growth of   4+ Growth of Beta Hemolytic Streptococcus Group G*  3+ Growth of Methicillin Resistant Staphylococcus aureus*  2+ Growth of Achromobacter xylosoxidans*  3+ Growth of        Wound Instructions:  Orders Placed This Encounter   Procedures    Wound cleansing and dressings     Wound cleansing and dressings                            Right and Left Leg wounds     Wash your hands with soap and water  Remove old dressing, discard into plastic bag and place in trash  Clean wound with Hibiclens scrub but do not leave on as a wet to dry dressing   Do not use tissue or cotton balls  Do not scrub the wound   Pat dry using gauze      Shower no can not get legs wet in shower     Apply Calmoseptine around liz wound (provided some sample packs today to bring home)  Apply Dakins wet to dry gauze onto wound  (GET DAKINS ASAP)  cover with absorptive dressing   Secure with nathalie and tape     Place Spandigrip F for light compression      Change dressing every day ,home care three times a week and family on non nursing days      Today at wound center applied maxsorb Ag for one time use then go to Dakins wet to dry tomorrow                  Wound home care                          Continue home care nurse for wound care 3x a week        Standing Status:   Future     Standing Expiration Date:   4/8/2023       Rangel Dowell PA-C, [facility]      Portions of the record may have been created with voice recognition software  Occasional wrong word or "sound alike" substitutions may have occurred due to the inherent limitations of voice recognition software  Read the chart carefully and recognize, using context, where substitutions have occurred

## 2022-04-12 ENCOUNTER — APPOINTMENT (EMERGENCY)
Dept: RADIOLOGY | Facility: HOSPITAL | Age: 77
End: 2022-04-12
Payer: COMMERCIAL

## 2022-04-12 ENCOUNTER — APPOINTMENT (EMERGENCY)
Dept: CT IMAGING | Facility: HOSPITAL | Age: 77
End: 2022-04-12
Payer: COMMERCIAL

## 2022-04-12 ENCOUNTER — HOSPITAL ENCOUNTER (EMERGENCY)
Facility: HOSPITAL | Age: 77
Discharge: HOME | End: 2022-04-12
Attending: EMERGENCY MEDICINE
Payer: COMMERCIAL

## 2022-04-12 VITALS
SYSTOLIC BLOOD PRESSURE: 137 MMHG | WEIGHT: 121.25 LBS | DIASTOLIC BLOOD PRESSURE: 76 MMHG | HEART RATE: 98 BPM | OXYGEN SATURATION: 98 % | TEMPERATURE: 98.3 F | BODY MASS INDEX: 22.18 KG/M2 | RESPIRATION RATE: 20 BRPM

## 2022-04-12 DIAGNOSIS — M79.604 PAIN OF RIGHT LOWER EXTREMITY: ICD-10-CM

## 2022-04-12 DIAGNOSIS — D72.829 LEUKOCYTOSIS, UNSPECIFIED TYPE: ICD-10-CM
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
## 2022-04-12 LAB
ALBUMIN SERPL BCP-MCNC: 3.1 G/DL (ref 3.5–5)
ALP SERPL-CCNC: 111 U/L (ref 46–116)
ALT SERPL W P-5'-P-CCNC: 11 U/L (ref 12–78)
ANION GAP SERPL CALCULATED.3IONS-SCNC: 10 MMOL/L (ref 4–13)
AST SERPL W P-5'-P-CCNC: 20 U/L (ref 5–45)
BASOPHILS # BLD AUTO: 0.06 THOUSANDS/ΜL (ref 0–0.1)
BASOPHILS NFR BLD AUTO: 0 % (ref 0–1)
BILIRUB SERPL-MCNC: 0.78 MG/DL (ref 0.2–1)
BUN SERPL-MCNC: 16 MG/DL (ref 5–25)
CALCIUM ALBUM COR SERPL-MCNC: 9.5 MG/DL (ref 8.3–10.1)
CALCIUM SERPL-MCNC: 8.8 MG/DL (ref 8.3–10.1)
CHLORIDE SERPL-SCNC: 105 MMOL/L (ref 100–108)
CO2 SERPL-SCNC: 26 MMOL/L (ref 21–32)
CREAT SERPL-MCNC: 0.71 MG/DL (ref 0.6–1.3)
EOSINOPHIL # BLD AUTO: 0.28 THOUSAND/ΜL (ref 0–0.61)
EOSINOPHIL NFR BLD AUTO: 2 % (ref 0–6)
ERYTHROCYTE [DISTWIDTH] IN BLOOD BY AUTOMATED COUNT: 19.9 % (ref 11.6–15.1)
GFR SERPL CREATININE-BSD FRML MDRD: 82 ML/MIN/1.73SQ M
GLUCOSE SERPL-MCNC: 100 MG/DL (ref 65–140)
HCT VFR BLD AUTO: 44.1 % (ref 34.8–46.1)
HGB BLD-MCNC: 12.1 G/DL (ref 11.5–15.4)
IMM GRANULOCYTES # BLD AUTO: 0.22 THOUSAND/UL (ref 0–0.2)
IMM GRANULOCYTES NFR BLD AUTO: 1 % (ref 0–2)
LYMPHOCYTES # BLD AUTO: 0.76 THOUSANDS/ΜL (ref 0.6–4.47)
LYMPHOCYTES NFR BLD AUTO: 5 % (ref 14–44)
MCH RBC QN AUTO: 20.4 PG (ref 26.8–34.3)
MCHC RBC AUTO-ENTMCNC: 27.4 G/DL (ref 31.4–37.4)
MCV RBC AUTO: 74 FL (ref 82–98)
MONOCYTES # BLD AUTO: 3.25 THOUSAND/ΜL (ref 0.17–1.22)
MONOCYTES NFR BLD AUTO: 20 % (ref 4–12)
NEUTROPHILS # BLD AUTO: 11.46 THOUSANDS/ΜL (ref 1.85–7.62)
NEUTS SEG NFR BLD AUTO: 72 % (ref 43–75)
NRBC BLD AUTO-RTO: 0 /100 WBCS
PLATELET # BLD AUTO: 644 THOUSANDS/UL (ref 149–390)
PMV BLD AUTO: 10.1 FL (ref 8.9–12.7)
POTASSIUM SERPL-SCNC: 4.6 MMOL/L (ref 3.5–5.3)
PROT SERPL-MCNC: 8.2 G/DL (ref 6.4–8.2)
RBC # BLD AUTO: 5.93 MILLION/UL (ref 3.81–5.12)
SODIUM SERPL-SCNC: 141 MMOL/L (ref 136–145)
WBC # BLD AUTO: 16.03 THOUSAND/UL (ref 4.31–10.16)

## 2022-04-12 PROCEDURE — 72170 X-RAY EXAM OF PELVIS: CPT

## 2022-04-12 PROCEDURE — 80053 COMPREHEN METABOLIC PANEL: CPT | Performed by: EMERGENCY MEDICINE

## 2022-04-12 PROCEDURE — 73552 X-RAY EXAM OF FEMUR 2/>: CPT

## 2022-04-12 PROCEDURE — 36415 COLL VENOUS BLD VENIPUNCTURE: CPT | Performed by: EMERGENCY MEDICINE

## 2022-04-12 PROCEDURE — 85025 COMPLETE CBC W/AUTO DIFF WBC: CPT | Performed by: EMERGENCY MEDICINE

## 2022-04-12 PROCEDURE — 99284 EMERGENCY DEPT VISIT MOD MDM: CPT

## 2022-04-12 PROCEDURE — 70450 CT HEAD/BRAIN W/O DYE: CPT

## 2022-04-12 PROCEDURE — 99284 EMERGENCY DEPT VISIT MOD MDM: CPT | Performed by: EMERGENCY MEDICINE

## 2022-04-12 PROCEDURE — 72125 CT NECK SPINE W/O DYE: CPT

## 2022-04-12 NOTE — ED NOTES
Per pt's granddaughter someone will be here in a few hours to pick her up     Mitchel Brunner, RN  04/12/22 0878

## 2022-04-12 NOTE — DISCHARGE INSTRUCTIONS
Please follow up with your family doctor and wound care  Return to the emergency department for any new worsening symptoms

## 2022-04-12 NOTE — ED PROVIDER NOTES
Pt Name: Reji Santana  MRN: [id number]  [place] 1945  Age/Sex: 67 y o  female  Date of evaluation: 4/12/2022  PCP: Quincy Oliveira, [address] [place] [address]    Chief Complaint   Patient presents with   Felda Isaias     pt reports falling last night striking her head, denies LOC, denies blood thinners    Numbness     pt reports increasing R leg numbness since last night         HPI and MDM    67 y o  female presenting with right leg pain  Patient states last night she fell out of her chair and hit her head on an air conditioner, and also fell on her right leg  Since then she has been having pain  No loss of consciousness  Granddaughter helped her up  She has had right mid femur pain since yesterday, states today she could not bear weight because of the pain  No nausea or vomiting, no visual changes  Patient denies any numbness to me  No obvious deformities, mild right mid thigh tenderness to palpation, neurovascularly intact  Patient has bilateral lower extremity wounds, she states she gets them dressed by Wound Care, does not want the dressing removed  Has not had any fevers, states the wounds have been healing well without any issues, no drainage  She has no complaints of her lower legs, does not have any pain in that region  She does have a nonspecific leukocytosis  Otherwise blood work is overall reassuring  Imaging results as below, no clinical signs of acute sinusitis  Patient states she only there is weight to transfer, otherwise uses a wheelchair  She is feeling better upon re-evaluation  She is agreeable with going back home  I advised follow-up with PCP and wound care  Return precautions discussed and patient verbalized understanding and is agreement with plan                Medications - No data to display      Past Medical and Surgical History    Past Medical History:   Diagnosis Date    Hyperlipidemia     Hypertension     Osteoporosis     Shingles        Past Surgical History: Procedure Laterality Date    CATARACT EXTRACTION Bilateral     FOOT SURGERY Right     reconstruction    TOTAL HIP ARTHROPLASTY Bilateral        Family History   Family history unknown: Yes       Social History     Tobacco Use    Smoking status: Former Smoker    Smokeless tobacco: Never Used    Tobacco comment: quit 50 years ago   Vaping Use    Vaping Use: Never used   Substance Use Topics    Alcohol use: Yes     Alcohol/week: 7 0 standard drinks     Types: 7 Glasses of wine per week    Drug use: Never           Allergies    Allergies   Allergen Reactions    Aspirin     Warfarin Other (See Comments)       Home Medications    Prior to Admission medications    Medication Sig Start Date End Date Taking? Authorizing Provider   calcium carbonate-vitamin D (OSCAL-D) 500 mg-200 units per tablet Take 1 tablet by mouth daily 10/1/21   Historical Provider, MD   Cyanocobalamin (B-12) 1000 MCG CAPS Take 1 capsule by mouth in the morning   9/8/21   Historical Provider, MD   gabapentin (NEURONTIN) 300 mg capsule Take 2 capsules (600 mg total) by mouth daily at bedtime 3/16/22   TELLY Toscano   Incontinence Supply Disposable (BRIEFS OVERNIGHT MEDIUM) MISC by Does not apply route 3 (three) times a day 10/24/19 11/30/21  TELLY Toscano   Misc  Devices (Commode Bedside/Back) MISC Use 1 Device 4 (four) times a day as needed (ambulatory dysfunction) 3/30/22   TELLY Toscano   Wound Dressings (Adaptic Non-Adhering Dressing) PADS Apply 30 each topically 2 (two) times a day 11/11/21   TELLY Toscano           Review of Systems    Review of Systems   Constitutional: Negative for chills and fever  HENT: Negative for rhinorrhea and sore throat  Eyes: Negative for pain and visual disturbance  Respiratory: Negative for cough and shortness of breath  Cardiovascular: Negative for chest pain and leg swelling  Gastrointestinal: Negative for abdominal pain, nausea and vomiting     Genitourinary: Negative for dysuria and hematuria  Musculoskeletal: Negative for back pain  Right mid thigh pain   Skin: Negative for rash and wound  Neurological: Negative for syncope and headaches  Physical Exam      ED Triage Vitals [04/12/22 1226]   Temperature Pulse Respirations Blood Pressure SpO2   98 3 °F (36 8 °C) 98 20 137/76 98 %      Temp Source Heart Rate Source Patient Position - Orthostatic VS BP Location FiO2 (%)   Oral Monitor Sitting Right arm --      Pain Score       --               Physical Exam  Constitutional:       General: She is not in acute distress  HENT:      Head: Normocephalic and atraumatic  Nose: Nose normal    Eyes:      Extraocular Movements: Extraocular movements intact  Pupils: Pupils are equal, round, and reactive to light  Cardiovascular:      Rate and Rhythm: Normal rate and regular rhythm  Pulmonary:      Effort: Pulmonary effort is normal  No respiratory distress  Abdominal:      General: There is no distension  Palpations: Abdomen is soft  Tenderness: There is no abdominal tenderness  Musculoskeletal:         General: No swelling  Cervical back: Normal range of motion and neck supple  Comments: Right mid thigh mild tenderness to palpation, no obvious deformity, compartments are soft   Skin:     General: Skin is warm  Comments: Bilateral lower extremity wounds with dressing   Neurological:      Mental Status: She is alert and oriented to person, place, and time  Mental status is at baseline                Diagnostic Results      Labs:    Results Reviewed     Procedure Component Value Units Date/Time    Comprehensive metabolic panel [[id number]]  (Abnormal) Collected: 04/12/22 1758    Lab Status: Final result Specimen: Blood from Arm, Left Updated: 04/12/22 1818     Sodium 141 mmol/L      Potassium 4 6 mmol/L      Chloride 105 mmol/L      CO2 26 mmol/L      ANION GAP 10 mmol/L      BUN 16 mg/dL      Creatinine 0 71 mg/dL      Glucose 100 mg/dL Calcium 8 8 mg/dL      Corrected Calcium 9 5 mg/dL      AST 20 U/L      ALT 11 U/L      Alkaline Phosphatase 111 U/L      Total Protein 8 2 g/dL      Albumin 3 1 g/dL      Total Bilirubin 0 78 mg/dL      eGFR 82 ml/min/1 73sq m     Narrative:      [place] guidelines for Chronic Kidney Disease (CKD):     Stage 1 with normal or high GFR (GFR > 90 mL/min/1 73 square meters)    Stage 2 Mild CKD (GFR = 60-89 mL/min/1 73 square meters)    Stage 3A Moderate CKD (GFR = 45-59 mL/min/1 73 square meters)    Stage 3B Moderate CKD (GFR = 30-44 mL/min/1 73 square meters)    Stage 4 Severe CKD (GFR = 15-29 mL/min/1 73 square meters)    Stage 5 End Stage CKD (GFR <15 mL/min/1 73 square meters)  Note: GFR calculation is accurate only with a steady state creatinine    CBC and differential [[id number]]  (Abnormal) Collected: 04/12/22 1758    Lab Status: Final result Specimen: Blood from Arm, Left Updated: 04/12/22 1802     WBC 16 03 Thousand/uL      RBC 5 93 Million/uL      Hemoglobin 12 1 g/dL      Hematocrit 44 1 %      MCV 74 fL      MCH 20 4 pg      MCHC 27 4 g/dL      RDW 19 9 %      MPV 10 1 fL      Platelets 682 Thousands/uL      nRBC 0 /100 WBCs      Neutrophils Relative 72 %      Immat GRANS % 1 %      Lymphocytes Relative 5 %      Monocytes Relative 20 %      Eosinophils Relative 2 %      Basophils Relative 0 %      Neutrophils Absolute 11 46 Thousands/µL      Immature Grans Absolute 0 22 Thousand/uL      Lymphocytes Absolute 0 76 Thousands/µL      Monocytes Absolute 3 25 Thousand/µL      Eosinophils Absolute 0 28 Thousand/µL      Basophils Absolute 0 06 Thousands/µL           All labs reviewed and utilized in the medical decision making process    Radiology:    XR femur 2 views RIGHT   Final Result   Intact total right hip arthroplasty      No acute osseous abnormality              Workstation performed: [id number]         XR pelvis ap only 1 or 2 vw   Final Result      No acute osseous abnormality  Workstation performed: [id number]         CT head without contrast   Final Result      No acute intracranial abnormality  Bilateral maxillary sinus air-fluid levels consistent with acute sinusitis  Workstation performed: [id number]         CT cervical spine without contrast   Final Result      No cervical spine fracture or traumatic malalignment  Workstation performed: [id number]             All radiology studies independently viewed by me and interpreted by the radiologist     Procedure    Procedures        FINAL IMPRESSION    Final diagnoses:   Fall, initial encounter   Pain of right lower extremity   Leukocytosis, unspecified type         DISPOSITION    Time reflects when diagnosis was documented in both MDM as applicable and the Disposition within this note     Time User Action Codes Description Comment    4/12/2022  6:44 PM [place] Passy Add [M88  SWRK] Fall, initial encounter     4/12/2022  6:44 PM Bam Passy Add [N94 495] Pain of right lower extremity     4/12/2022  6:45 PM [place] Passy Add [S19 694] Leukocytosis, unspecified type       ED Disposition     ED Disposition Condition Date/Time Comment    Discharge Stable Tue Apr 12, 2022  6:44 PM Drea Jade discharge to home/self care  Follow-up Information    None           PATIENT REFERRED TO:    No follow-up provider specified      DISCHARGE MEDICATIONS:    Discharge Medication List as of 4/12/2022  7:01 PM      CONTINUE these medications which have NOT CHANGED    Details   calcium carbonate-vitamin D (OSCAL-D) 500 mg-200 units per tablet Take 1 tablet by mouth daily, Starting Fri 10/1/2021, Historical Med      Cyanocobalamin (B-12) 1000 MCG CAPS Take 1 capsule by mouth in the morning  , Starting Wed 9/8/2021, Historical Med      gabapentin (NEURONTIN) 300 mg capsule Take 2 capsules (600 mg total) by mouth daily at bedtime, Starting Wed 3/16/2022, Normal      Incontinence Supply Disposable (BRIEFS OVERNIGHT MEDIUM) MISC by Does not apply route 3 (three) times a day, Starting Thu 10/24/2019, Until Tue 11/30/2021, Print      Misc  Devices (Commode Bedside/Back) MISC Use 1 Device 4 (four) times a day as needed (ambulatory dysfunction), Starting Wed 3/30/2022, Print      Wound Dressings (Adaptic Non-Adhering Dressing) PADS Apply 30 each topically 2 (two) times a day, Starting Thu 11/11/2021, Print             No discharge procedures on file  Jett Mckay DO        This note was partially completed using voice recognition technology, and was scanned for gross errors; however some errors may still exist  Please contact the author with any questions or requests for clarification        Jett Mckay DO  04/23/22 2816

## 2022-04-13 NOTE — ED NOTES
Call to [address] [place] [address] transport home, will call back with [address], RN  04/12/22 2023

## 2022-04-18 ENCOUNTER — TELEPHONE (OUTPATIENT)
Dept: WOUND CARE | Facility: CLINIC | Age: 77
End: 2022-04-18

## 2022-04-18 ENCOUNTER — TELEPHONE (OUTPATIENT)
Dept: FAMILY MEDICINE CLINIC | Facility: CLINIC | Age: 77
End: 2022-04-18

## 2022-04-18 NOTE — TELEPHONE ENCOUNTER
T/C to patient to reschedule appointment  Patient stated she will call back when she knows her grand daughter's schedule  Unable to move her leg after a fall last week also  [facility]  Last visit the nurse stated there were roaches in the woundcare supply box and I don't want her back  Asked patient is they are using the dakin's solution wet to dry as ordered  Still this has not been picked up  A dry dressing is being used   Discussed the importance of following the plan of care to stay out of the hospital

## 2022-04-18 NOTE — TELEPHONE ENCOUNTER
T/C to [facility] [place] [address] care at [phone]  Spoke with Svetlana Blevins who is the DON  Stated that the patient no longer wants nursing services

## 2022-04-18 NOTE — TELEPHONE ENCOUNTER
Marissa Price calling from [facility] [place]   To notify you the Chris Miner no longer wants their services

## 2022-05-13 DIAGNOSIS — G89.29 OTHER CHRONIC PAIN: ICD-10-CM

## 2022-05-13 RX ORDER — GABAPENTIN 300 MG/1
600 CAPSULE ORAL
Qty: 90 CAPSULE | Refills: 1 | Status: SHIPPED | OUTPATIENT
Start: 2022-05-13 | End: 2022-07-18 | Stop reason: SDUPTHER

## 2022-05-21 NOTE — PROGRESS NOTES
[facility]  Progress Note - Pink Batter 1945, 67 y o  female MRN: [id number]  Unit/Bed#: -Alejandro Encounter: [id number]  Primary Care Provider: TELLY David   Date and time admitted to hospital: 6/7/2021  7:43 PM    * Cellulitis of left foot  Assessment & Plan  · Patient presented with Fluid-filled blister that appears cloudy to left lateral ankle  States first noticed on day prior to admission  Erythema and warmth surrounding to mid foot and mid-thigh  Also has chronic ulceration to left medial ankle and right medial ankle/calf with surrounding erythema  · WBC 12 00  LA 1 3  Afebrile    Plan:  Currently slowly improving  Continue antibiotics, currently on cephazolin IV  Podiatry input appreciated - patient underwent incision and drainage of left small abscess of the heel  Waiting for cultures  Monitor clinically, temperature curve, white count    Open wound of right lower leg  Assessment & Plan  · Chronic ulceration to right medial ankle/calf x 1-1 5 years  · See AP above for LLE wound    Open wound of left lower leg  Assessment & Plan  · 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 1-1 5 years  States has been following with wound care clinic as outpt, but has been unable to go x 1 month 2/2 lack of transportation  (-) hx of DM  · Wound care consult  · CM consult to assist with wound care resources available as outpt/HH  · Wound care orders placed pending wound care nurse recommendations    Microcytic anemia  Assessment & Plan  · Hb decreased at 9 0 on admission  MCV 81  · No evidence of acute bleeding  · CBC and iron panel in am    Essential (primary) hypertension  Assessment & Plan  · BP adequately controlled currently  · States is no longer taking home dose Atenolol, HCTZ or Vastec because her BP was running too low   States sis not address this with PCP prior to discontinuing her meds  · Hydrochlorothiazide has been discontinued due to blood pressure on the lower side  · Monitor BP per unit protocol    VTE Pharmacologic Prophylaxis:   Pharmacologic: Enoxaparin (Lovenox)  Mechanical VTE Prophylaxis in Place: Yes    Patient Centered Rounds: I have performed bedside rounds with nursing staff today  Discussions with Specialists or Other Care Team Provider:  Noted notes from specialists    Education and Discussions with Family / Patient:  IV antibiotics    Time Spent for Care: 30 minutes  More than 50% of total time spent on counseling and coordination of care as described above  Current Length of Stay: 3 day(s)    Current Patient Status: Inpatient   Certification Statement: The patient will continue to require additional inpatient hospital stay due to Need for therapy    Discharge Plan:  Once stable    Code Status: Level 2 - DNAR: but accepts endotracheal intubation      Subjective:     Patient evaluated this morning  Denies any nausea vomiting, diarrhea constipation  She feels overall well  Tolerating antibiotics well  Cultures still pending  No other events reported  Objective:     Vitals:   Temp (24hrs), Av 3 °F (36 8 °C), Min:97 8 °F (36 6 °C), Max:99 °F (37 2 °C)    Temp:  [97 8 °F (36 6 °C)-99 °F (37 2 °C)] 97 8 °F (36 6 °C)  HR:  [75-85] 78  Resp:  [17-18] 18  BP: ()/(49-60) 104/60  SpO2:  [95 %-96 %] 96 %  Body mass index is 24 21 kg/m²  Input and Output Summary (last 24 hours): Intake/Output Summary (Last 24 hours) at 6/10/2021 1410  Last data filed at 6/10/2021 0816  Gross per 24 hour   Intake 2240 ml   Output 550 ml   Net 1690 ml       Physical Exam:     Physical Exam  Vitals signs and nursing note reviewed  Constitutional:       Appearance: Normal appearance  She is normal weight  Comments: Elderly female in bed, awake   HENT:      Head: Normocephalic and atraumatic  Right Ear: External ear normal       Left Ear: External ear normal       Nose: Nose normal  No congestion        Mouth/Throat:      Mouth: Mucous membranes are moist       Pharynx: Oropharynx is clear  No oropharyngeal exudate or posterior oropharyngeal erythema  Eyes:      General: No scleral icterus  Right eye: No discharge  Left eye: No discharge  Extraocular Movements: Extraocular movements intact  Conjunctiva/sclera: Conjunctivae normal       Pupils: Pupils are equal, round, and reactive to light  Neck:      Musculoskeletal: Normal range of motion and neck supple  No neck rigidity or muscular tenderness  Cardiovascular:      Rate and Rhythm: Normal rate and regular rhythm  Pulses: Normal pulses  Heart sounds: Normal heart sounds  No murmur  No friction rub  No gallop  Pulmonary:      Effort: Pulmonary effort is normal  No respiratory distress  Breath sounds: Normal breath sounds  No stridor  No wheezing, rhonchi or rales  Chest:      Chest wall: No tenderness  Abdominal:      General: Abdomen is flat  Bowel sounds are normal  There is no distension  Palpations: Abdomen is soft  There is no mass  Tenderness: There is no abdominal tenderness  There is no guarding or rebound  Musculoskeletal: Normal range of motion  General: No swelling, tenderness, deformity or signs of injury  Skin:     General: Skin is warm and dry  Capillary Refill: Capillary refill takes less than 2 seconds  Coloration: Skin is not jaundiced or pale  Findings: No bruising, erythema, lesion or rash  Comments: Wounds in bilateral lower extremities currently dressed, area of redness particularly in the left lower extremity since significantly improved at this point   Neurological:      General: No focal deficit present  Mental Status: She is alert and oriented to person, place, and time  Mental status is at baseline  Cranial Nerves: No cranial nerve deficit  Sensory: No sensory deficit  Motor: No weakness        Coordination: Coordination normal    Psychiatric:         Mood and Affect: Mood normal          Behavior: Behavior normal          Thought Content: Thought content normal          Judgment: Judgment normal            Additional Data:     Labs:    Results from last 7 days   Lab Units 06/10/21  0532 06/09/21  0439   WBC Thousand/uL 6 32 8 07   HEMOGLOBIN g/dL 7 8* 8 1*   HEMATOCRIT % 29 0* 29 7*   PLATELETS Thousands/uL 550* 586*   BANDS PCT % 1  --    NEUTROS PCT %  --  55   LYMPHS PCT %  --  14   LYMPHO PCT % 18  --    MONOS PCT %  --  26*   MONO PCT % 21*  --    EOS PCT % 3 3     Results from last 7 days   Lab Units 06/10/21  0532 06/09/21  0439   SODIUM mmol/L 141 141   POTASSIUM mmol/L 3 9 4 1   CHLORIDE mmol/L 105 105   CO2 mmol/L 26 27   BUN mg/dL 11 13   CREATININE mg/dL 0 65 0 71   ANION GAP mmol/L 10 9   CALCIUM mg/dL 8 2* 8 6   ALBUMIN g/dL  --  2 2*   TOTAL BILIRUBIN mg/dL  --  0 37   ALK PHOS U/L  --  90   ALT U/L  --  12   AST U/L  --  16   GLUCOSE RANDOM mg/dL 87 99                 Results from last 7 days   Lab Units 06/07/21 2051   LACTIC ACID mmol/L 1 3           * I Have Reviewed All Lab Data Listed Above  * Additional Pertinent Lab Tests Reviewed: Kandi [facility] Admission Reviewed      Recent Cultures (last 7 days):     Results from last 7 days   Lab Units 06/09/21  0822 06/07/21 2051   BLOOD CULTURE   --  No Growth at 48 hrs  No Growth at 48 hrs     GRAM STAIN RESULT  Rare Polys*  Rare Gram positive cocci in pairs*  --    WOUND CULTURE  2+ Growth of Staphylococcus aureus*  --        Last 24 Hours Medication List:   Current Facility-Administered Medications   Medication Dose Route Frequency Provider Last Rate    acetaminophen  650 mg Oral Q4H PRN Narendra Reilly PA-C      aluminum-magnesium hydroxide-simethicone  30 mL Oral Q6H PRN Narendra Reilly PA-C      atorvastatin  [facility]      calcium carbonate-vitamin D  1 tablet Oral Daily With Breakfast Narendra Reilly PA-C      cefazolin  1,000 mg Intravenous Q8H Pretty Jackson PA-C 1,000 mg (06/10/21 1334)    cyanocobalamin  1,000 mcg Oral Daily Pretty Jackson PA-C      enoxaparin  40 mg Subcutaneous Daily [place] Ramirez      ferrous gluconate  324 mg Oral BID AC Meghan Eubanks MD      gabapentin  600 mg Oral HS Pretty Jackson, [place]      HYDROmorphone  0 2 mg Intravenous Q3H PRN Pretty Jackson PA-C      melatonin  3 mg Oral HS [place] Nena Ramierz      multi-electrolyte  50 mL/hr Intravenous Continuous Meghan Eubanks MD 50 mL/hr (06/10/21 0817)    ondansetron  4 mg Intravenous Q6H PRN Pretty Jackson PA-C      oxyCODONE  2 5 mg Oral Q4H PRN Pretty Jackson PA-C      oxyCODONE  5 mg Oral Q4H PRN Pretty Jackson PA-C          Today, Patient Was Seen By: Meghan Eubanks MD    ** Please Note: Dictation voice to text software may have been used in the creation of this document   ** [facility]

## 2022-06-01 ENCOUNTER — TELEPHONE (OUTPATIENT)
Dept: FAMILY MEDICINE CLINIC | Facility: CLINIC | Age: 77
End: 2022-06-01

## 2022-06-01 NOTE — TELEPHONE ENCOUNTER
Pt left a message in [employer] requesting for antibiotic because she has Cellulitis on the leg and begging to be infected  She also stated that she does not have money and transportation to go to the Emergency Department

## 2022-06-02 NOTE — TELEPHONE ENCOUNTER
I spoke with the pt she said her niece will call and schedule a virtual exam upon her niece availability

## 2022-07-05 ENCOUNTER — TELEPHONE (OUTPATIENT)
Dept: FAMILY MEDICINE CLINIC | Facility: CLINIC | Age: 77
End: 2022-07-05

## 2022-07-05 NOTE — TELEPHONE ENCOUNTER
Kade Bell is scheduled for a virtual visit tomorrow  Needs in-person  I need to assess her wounds   Thanks

## 2022-07-05 NOTE — TELEPHONE ENCOUNTER
Patients home nurse called to let you know she tried to go an evaluation, but patient declined PT services so she was discharged

## 2022-07-07 ENCOUNTER — TELEPHONE (OUTPATIENT)
Dept: FAMILY MEDICINE CLINIC | Facility: CLINIC | Age: 77
End: 2022-07-07

## 2022-07-18 DIAGNOSIS — G89.29 OTHER CHRONIC PAIN: ICD-10-CM

## 2022-07-19 RX ORDER — GABAPENTIN 300 MG/1
600 CAPSULE ORAL
Qty: 90 CAPSULE | Refills: 1 | Status: SHIPPED | OUTPATIENT
Start: 2022-07-19 | End: 2022-09-01 | Stop reason: SDUPTHER

## 2022-09-01 ENCOUNTER — TELEPHONE (OUTPATIENT)
Dept: FAMILY MEDICINE CLINIC | Facility: CLINIC | Age: 77
End: 2022-09-01

## 2022-09-01 DIAGNOSIS — G89.29 OTHER CHRONIC PAIN: ICD-10-CM

## 2022-09-01 RX ORDER — GABAPENTIN 300 MG/1
600 CAPSULE ORAL
Qty: 90 CAPSULE | Refills: 1 | Status: SHIPPED | OUTPATIENT
Start: 2022-09-01 | End: 2022-12-09

## 2022-09-01 NOTE — TELEPHONE ENCOUNTER
Spoke with patient and she is changing to [facility] as of right now she cant come in for a er f/u because she has no vehicle   She did ask for a refill on her gabapentin

## 2022-09-01 NOTE — TELEPHONE ENCOUNTER
Called patient to follow up with her regarding her ER visit on 8/31/22  Patient will not be following [facility] to her new location and will be selecting a new PCP at [facility]

## 2022-09-06 ENCOUNTER — TELEPHONE (OUTPATIENT)
Dept: FAMILY MEDICINE CLINIC | Facility: CLINIC | Age: 77
End: 2022-09-06

## 2022-09-06 DIAGNOSIS — L03.116 CELLULITIS OF LEFT FOOT: Primary | ICD-10-CM

## 2022-09-06 NOTE — TELEPHONE ENCOUNTER
Patient called stating Edvin Grady used to send in [place] for her ulcers on her leg and wanted to know if you could send them in as well?  I did advise her that since you never prescribed these you may need an appointment but she requested I sent a message to you first

## 2022-09-09 NOTE — TELEPHONE ENCOUNTER
Called patient  She is aware  She made an appointment for the end of September  She currently has no transportation

## 2022-09-12 DIAGNOSIS — L03.116 CELLULITIS OF LEFT FOOT: ICD-10-CM

## 2022-09-26 RX ORDER — CEPHALEXIN 500 MG/1
CAPSULE ORAL
COMMUNITY
Start: 2022-08-31

## 2022-09-26 RX ORDER — SULFAMETHOXAZOLE AND TRIMETHOPRIM 800; 160 MG/1; MG/1
TABLET ORAL
COMMUNITY
Start: 2022-08-31

## 2022-09-26 RX ORDER — DOXYCYCLINE HYCLATE 100 MG
TABLET ORAL
COMMUNITY
Start: 2022-06-28

## 2022-09-26 RX ORDER — AMMONIUM LACTATE 12 G/100G
1 LOTION TOPICAL 2 TIMES DAILY
COMMUNITY
Start: 2022-06-28

## 2022-09-26 RX ORDER — ENOXAPARIN SODIUM 100 MG/ML
INJECTION SUBCUTANEOUS
COMMUNITY

## 2022-12-09 DIAGNOSIS — G89.29 OTHER CHRONIC PAIN: ICD-10-CM

## 2022-12-09 RX ORDER — GABAPENTIN 300 MG/1
600 CAPSULE ORAL
Qty: 60 CAPSULE | Refills: 2 | Status: SHIPPED | OUTPATIENT
Start: 2022-12-09

## 2023-02-20 DIAGNOSIS — G89.29 OTHER CHRONIC PAIN: ICD-10-CM

## 2023-02-20 RX ORDER — GABAPENTIN 300 MG/1
CAPSULE ORAL
Qty: 60 CAPSULE | Refills: 2 | Status: SHIPPED | OUTPATIENT
Start: 2023-02-20

## 2023-02-21 ENCOUNTER — OFFICE VISIT (OUTPATIENT)
Dept: WOUND CARE | Facility: CLINIC | Age: 78
End: 2023-02-21

## 2023-02-21 VITALS
TEMPERATURE: 98.5 F | RESPIRATION RATE: 20 BRPM | DIASTOLIC BLOOD PRESSURE: 66 MMHG | SYSTOLIC BLOOD PRESSURE: 124 MMHG | HEART RATE: 86 BPM

## 2023-02-21 DIAGNOSIS — L97.929 CHRONIC VENOUS HYPERTENSION (IDIOPATHIC) WITH ULCER AND INFLAMMATION OF BILATERAL LOWER EXTREMITY (HCC): ICD-10-CM

## 2023-02-21 DIAGNOSIS — L97.922 NON-PRESSURE CHRONIC ULCER OF LEFT LOWER LEG WITH FAT LAYER EXPOSED (HCC): ICD-10-CM

## 2023-02-21 DIAGNOSIS — L97.919 CHRONIC VENOUS HYPERTENSION (IDIOPATHIC) WITH ULCER AND INFLAMMATION OF BILATERAL LOWER EXTREMITY (HCC): ICD-10-CM

## 2023-02-21 DIAGNOSIS — L97.912 NON-PRESSURE CHRONIC ULCER OF RIGHT LOWER LEG, WITH FAT LAYER EXPOSED (HCC): Primary | ICD-10-CM

## 2023-02-21 DIAGNOSIS — I73.9 PERIPHERAL VASCULAR DISEASE OF EXTREMITY (HCC): ICD-10-CM

## 2023-02-21 DIAGNOSIS — I87.333 CHRONIC VENOUS HYPERTENSION (IDIOPATHIC) WITH ULCER AND INFLAMMATION OF BILATERAL LOWER EXTREMITY (HCC): ICD-10-CM

## 2023-02-21 RX ORDER — SODIUM HYPOCHLORITE 2.5 MG/ML
1 SOLUTION TOPICAL DAILY
Qty: 473 ML | Refills: 2 | Status: SHIPPED | OUTPATIENT
Start: 2023-02-21

## 2023-02-21 RX ORDER — LIDOCAINE HYDROCHLORIDE 40 MG/ML
5 SOLUTION TOPICAL ONCE
Status: COMPLETED | OUTPATIENT
Start: 2023-02-21 | End: 2023-02-21

## 2023-02-21 RX ADMIN — LIDOCAINE HYDROCHLORIDE 5 ML: 40 SOLUTION TOPICAL at 14:24

## 2023-02-21 NOTE — LETTER
[address]  [facility] [address]  Phone#  [phone]  Fax#  [phone]    Patient:  Milka Arevalo  YOB: 1945  Phone:  [phone]  Date of Visit:  2/21/2023    Orders Placed This Encounter   Procedures   • Wound cleansing and dressings     Left and Right Leg    Wash your hands with soap and water  Remove old dressing, discard into plastic bag and place in trash  Cleanse the wound with 10 minute soak of Dakins (Dakins sent to pharmacy at [facility]) prior to applying a clean dressing  Do not use tissue or cotton balls  Do not scrub the wound  Pat dry using gauze  Shower no spomge bath only    Apply calmoseptine or zinc to liz wound  Apply silver alginate  to the  wound    Cover with ABD pad  Secure with nathalie and tape  Change dressing daily      Continue home care for wound care, wound care is now daily so grand daughter can do on non nursing days but home care need to order enough supplies for daily care    Need to follow up with the infectious disease MD     Standing Status:   Future     Standing Expiration Date:   2/21/2024         Electronically signed by Ranjit Mcqeuen PA-C

## 2023-02-21 NOTE — PATIENT INSTRUCTIONS
Orders Placed This Encounter   Procedures    Wound cleansing and dressings     Left and Right Leg    Wash your hands with soap and water  Remove old dressing, discard into plastic bag and place in trash  Cleanse the wound with 10 minute soak of Dakins (Dakins sent to pharmacy at [facility]) prior to applying a clean dressing  Do not use tissue or cotton balls  Do not scrub the wound  Pat dry using gauze  Shower no spomge bath only    Apply calmoseptine or zinc to liz wound  Apply silver alginate  to the  wound    Cover with ABD pad  Secure with nathalie and tape  Change dressing daily      Continue home care for wound care, wound care is now daily so grand daughter can do on non nursing days but home care need to order enough supplies for daily care    Need to follow up with the infectious disease MD     Standing Status:   Future     Standing Expiration Date:   2/21/2024

## 2023-02-21 NOTE — PROGRESS NOTES
Patient ID: Marin Garcia is a 65 y o  female Date of Birth 1945       Chief Complaint   Patient presents with   • New Patient Visit     Bilateral leg wound       Allergies:  Aspirin and Warfarin    Diagnosis:   Diagnosis ICD-10-CM Associated Orders   1  Non-pressure chronic ulcer of right lower leg, with fat layer exposed ([facility] )  L97 912 Debridement      2  Chronic venous hypertension (idiopathic) with ulcer and inflammation of bilateral lower extremity ([facility])  I87 333 sodium hypochlorite (DAKIN'S HALF-STRENGTH) external solution    L97 919 lidocaine (XYLOCAINE) 4 % topical solution 5 mL    L97 929 Wound cleansing and dressings     VAS reflux lower limb venous duplex study with reflux assessment, complete bilateral     VAS lower limb arterial duplex, complete bilateral     Debridement     Debridement      3  Non-pressure chronic ulcer of left lower leg with fat layer exposed ([facility] )  L97 922 sodium hypochlorite (DAKIN'S HALF-STRENGTH) external solution     lidocaine (XYLOCAINE) 4 % topical solution 5 mL     Wound cleansing and dressings     VAS reflux lower limb venous duplex study with reflux assessment, complete bilateral     VAS lower limb arterial duplex, complete bilateral     Debridement      4  Peripheral vascular disease of extremity ([facility])  I73 9            Assessment and Plan :  • BLE chronic venous ulcers with yellow adherent slough and green drainage  • Debrided as below  • Wound management with 10-15 min Dakins soak then Opticell Ag daily dressing changes, see wound orders below    • Compression with Tubigrip  • Do not wet wounds  • Counseled on importance of frequent elevation of leg  • F/u with Infectious Disease  • Arterial and venous studies ordered  F/u results    • Counseled on adequate protein intake, 3-4 servings per day  • Followup in 1 week or call sooner with questions or concerns       Subjective:   2/21: Patient presents for followup evaluation of BLE venous ulcers   Pt was last seen at our office on 4/8/22  Since then pt has had multiple hospitalizations for BLE cellulitis with last admission on 12/6/22  Patient's wound cultures were positive for Proteus and treated with Augmentin and Bactrim  Has been having dressings changed every other day with Silver alginate and c/o green drainage Pt denies any sob, fatigue, N/V, CP, fever or chills  Pt is accompanied by his granddaughter who is actively involved in her care  The following portions of the patient's history were reviewed and updated as appropriate:   Patient Active Problem List   Diagnosis   • Hammer toe   • Combined hyperlipidemia   • Essential (primary) hypertension   • Foot pain   • IFG (impaired fasting glucose)   • Senile osteoporosis   • Encounter to establish care   • Open wound of left lower leg   • Open wound of right lower leg   • Cellulitis of left foot   • Microcytic anemia   • Acute blood loss anemia   • Severe malnutrition (HCC)   • Peripheral vascular disease of extremity (HCC)   • Need for transfer to another facility   • Continuous opioid dependence (HCC)   • Wound cellulitis   • Thrombocytosis   • Ulcers of both lower extremities with fat layer exposed ([address] )   • Other chronic pain     Past Medical History:   Diagnosis Date   • Hyperlipidemia    • Hypertension    • Osteoporosis    • Shingles      Past Surgical History:   Procedure Laterality Date   • CATARACT EXTRACTION Bilateral    • FOOT SURGERY Right     reconstruction   • TOTAL HIP ARTHROPLASTY Bilateral      Family History   Family history unknown: Yes     Social History     Socioeconomic History   • Marital status:       Spouse name: None   • Number of children: None   • Years of education: None   • Highest education level: None   Occupational History   • None   Tobacco Use   • Smoking status: Former   • Smokeless tobacco: Never   • Tobacco comments:     quit 50 years ago   Vaping Use   • Vaping Use: Never used   Substance and Sexual Activity   • Alcohol use: Yes     Alcohol/week: 7 0 standard drinks     Types: 7 Glasses of wine per week   • Drug use: Never   • Sexual activity: None   Other Topics Concern   • None   Social History Narrative   • None     Social Determinants of Health     Financial Resource Strain: Not on file   Food Insecurity: No Food Insecurity   • Worried About Running Out of Food in the Last Year: Never true   • Ran Out of Food in the Last Year: Never true   Transportation Needs: No Transportation Needs   • Lack of Transportation (Medical): No   • Lack of Transportation (Non-Medical): No   Physical Activity: Not on file   Stress: Not on file   Social Connections: Not on file   Intimate Partner Violence: Not on file   Housing Stability: Low Risk    • Unable to Pay for Housing in the Last Year: No   • Number of Places Lived in the Last Year: 1   • Unstable Housing in the Last Year: No       Current Outpatient Medications:   •  sodium hypochlorite (DAKIN'S HALF-STRENGTH) external solution, Apply 1 application topically daily Soak gauze and pack into wounds daily  , Disp: 473 mL, Rfl: 2  •  ammonium lactate (LAC-HYDRIN) 12 % lotion, Apply 1 application topically 2 (two) times a day To affected area, Disp: , Rfl:   •  calcium carbonate-vitamin D (OSCAL-D) 500 mg-200 units per tablet, Take 1 tablet by mouth daily, Disp: , Rfl:   •  cephalexin (KEFLEX) 500 mg capsule, TAKE 1 CAPSULE BY MOUTH FOUR TIMES A DAY FOR 7 DAYS, Disp: , Rfl:   •  collagenase (SANTYL) ointment, Apply topically daily, Disp: 90 g, Rfl: 1  •  Cyanocobalamin (B-12) 1000 MCG CAPS, Take 1 capsule by mouth in the morning  , Disp: , Rfl:   •  doxycycline hyclate (VIBRA-TABS) 100 mg tablet, TAKE 1 TABLET BY MOUTH TWICE A DAY FOR 7 DAYS, Disp: , Rfl:   •  enoxaparin (LOVENOX) 40 mg/0 4 mL, enoxaparin 40 mg/0 4 mL subcutaneous syringe, Disp: , Rfl:   •  gabapentin (NEURONTIN) 300 mg capsule, TAKE 2 CAPSULES BY MOUTH EVERY DAY AT BEDTIME, Disp: 60 capsule, Rfl: 2  •  Incontinence Supply Disposable (BRIEFS OVERNIGHT MEDIUM) MISC, by Does not apply route 3 (three) times a day, Disp: 30 each, Rfl: 3  •  Misc  Devices (Commode Bedside/Back) MISC, Use 1 Device 4 (four) times a day as needed (ambulatory dysfunction), Disp: 1 each, Rfl: 0  •  sulfamethoxazole-trimethoprim (BACTRIM DS) 800-160 mg per tablet, TAKE 1 TABLET BY MOUTH TWICE A DAY FOR 7 DAYS, Disp: , Rfl:   •  Wound Dressings (Adaptic Non-Adhering Dressing) PADS, Apply 30 each topically 2 (two) times a day, Disp: 30 each, Rfl: 0  No current facility-administered medications for this visit  Review of Systems   Constitutional: Negative for appetite change, chills, fatigue, fever and unexpected weight change  HENT: Negative for congestion, hearing loss and postnasal drip  Respiratory: Negative for cough and shortness of breath  Cardiovascular: Positive for leg swelling  Musculoskeletal: Positive for gait problem (wheelchair)  Skin: Positive for wound (BLE)  Negative for rash  Neurological: Negative for numbness  Hematological: Does not bruise/bleed easily  Psychiatric/Behavioral: Negative  Objective:  /66   Pulse 86   Temp 98 5 °F (36 9 °C)   Resp 20   Pain Score:   2     Physical Exam  Vitals reviewed  Constitutional:       General: She is not in acute distress  Appearance: Normal appearance  She is well-developed  She is obese  HENT:      Head: Normocephalic and atraumatic  Cardiovascular:      Rate and Rhythm: Normal rate  Pulmonary:      Effort: Pulmonary effort is normal    Musculoskeletal:         General: No deformity  Right lower leg: Edema present  Left lower leg: Edema present  Skin:     General: Skin is warm and dry  Findings: Wound (BLE) present  Comments: +moderate green drainage, yellow adherent slough on pink granulated tissue  See wound assessment   Neurological:      General: No focal deficit present        Mental Status: She is alert and oriented to person, place, and time  Gait: Gait abnormal    Psychiatric:         Mood and Affect: Mood and affect normal          Behavior: Behavior normal  Behavior is cooperative  Wound 02/21/23 Other (comment) Pretibial Left (Active)   Wound Description Yellow;Pink;Granulation tissue 02/21/23 1417   Jessica-wound Assessment Pink 02/21/23 1417   Wound Length (cm) 18 cm 02/21/23 1417   Wound Width (cm) 13 cm 02/21/23 1417   Wound Depth (cm) 0 2 cm 02/21/23 1417   Wound Surface Area (cm^2) 234 cm^2 02/21/23 1417   Wound Volume (cm^3) 46 8 cm^3 02/21/23 1417   Calculated Wound Volume (cm^3) 46 8 cm^3 02/21/23 1417   Drainage Amount Copious 02/21/23 1417   Drainage Description Green;Serosanguineous 02/21/23 1417   Non-staged Wound Description Full thickness 02/21/23 1417   Dressing Status Intact 02/21/23 1417       Wound 02/21/23 Other (comment) Pretibial Right (Active)   Wound Description Yellow;Pink;Granulation tissue 02/21/23 1417   Jessica-wound Assessment Pink 02/21/23 1417   Wound Length (cm) 13 5 cm 02/21/23 1417   Wound Width (cm) 8 6 cm 02/21/23 1417   Wound Depth (cm) 0 2 cm 02/21/23 1417   Wound Surface Area (cm^2) 116 1 cm^2 02/21/23 1417   Wound Volume (cm^3) 23 22 cm^3 02/21/23 1417   Calculated Wound Volume (cm^3) 23 22 cm^3 02/21/23 1417   Drainage Amount Copious 02/21/23 1417   Drainage Description Serosanguineous;Green 02/21/23 1417   Non-staged Wound Description Full thickness 02/21/23 1417   Dressing Status Intact 02/21/23 1417     Debridement   Wound 02/21/23 Other (comment) Pretibial Left    Universal Protocol:  Consent: Verbal consent obtained  Written consent obtained  Risks and benefits: risks, benefits and alternatives were discussed  Consent given by: patient  Time out: Immediately prior to procedure a "time out" was called to verify the correct patient, procedure, equipment, support staff and site/side marked as required    Patient understanding: patient states understanding of the procedure being performed  Patient identity confirmed: verbally with patient      Performed by: PA  Debridement type: surgical  Level of debridement: subcutaneous tissue  Pain control: lidocaine 4%  Post-debridement measurements  Length (cm): 18  Width (cm): 13  Depth (cm): 0 3  Percent debrided: 75%  Surface Area (cm^2): 234  Area debrided (cm^2): 175 5  Volume (cm^3): 70 2  Tissue and other material debrided: subcutaneous tissue  Devitalized tissue debrided: biofilm, exudate, fibrin and slough  Instrument(s) utilized: blade  Bleeding: small  Hemostasis obtained with: pressure  Procedural pain (0-10): 0  Post-procedural pain: 0   Response to treatment: procedure was tolerated well    Debridement   Wound 02/21/23 Other (comment) Pretibial Right    Universal Protocol:  Consent: Verbal consent obtained  Written consent obtained  Risks and benefits: risks, benefits and alternatives were discussed  Consent given by: patient  Time out: Immediately prior to procedure a "time out" was called to verify the correct patient, procedure, equipment, support staff and site/side marked as required    Patient understanding: patient states understanding of the procedure being performed  Patient identity confirmed: verbally with patient      Performed by: PA  Debridement type: surgical  Level of debridement: subcutaneous tissue  Pain control: lidocaine 4%  Post-debridement measurements  Length (cm): 13 5  Width (cm): 8 6  Depth (cm): 0 3  Percent debrided: 100%  Surface Area (cm^2): 116 1  Area debrided (cm^2): 116 1  Volume (cm^3): 34 83  Tissue and other material debrided: subcutaneous tissue  Devitalized tissue debrided: biofilm, exudate, fibrin and slough  Instrument(s) utilized: blade  Bleeding: small  Hemostasis obtained with: pressure  Procedural pain (0-10): 0  Post-procedural pain: 0   Response to treatment: procedure was tolerated well                     Wound Instructions:  Orders Placed This Encounter   Procedures   • Wound cleansing and dressings     Left and Right Leg    Wash your hands with soap and water  Remove old dressing, discard into plastic bag and place in trash  Cleanse the wound with 10 minute soak of Dakins (Dakins sent to pharmacy at [facility]) prior to applying a clean dressing  Do not use tissue or cotton balls  Do not scrub the wound  Pat dry using gauze  Shower no spomge bath only    Apply calmoseptine or zinc to liz wound  Apply silver alginate  to the  wound  Cover with ABD pad  Secure with nathalie and tape  Change dressing daily      Continue home care for wound care, wound care is now daily so grand daughter can do on non nursing days but home care need to order enough supplies for daily care    Need to follow up with the infectious disease MD     Standing Status:   Future     Standing Expiration Date:   2/21/2024   • Debridement     This order was created via procedure documentation   • Debridement     This order was created via procedure documentation       Chau Sadler PA-C, [facility]      Portions of the record may have been created with voice recognition software  Occasional wrong word or "sound alike" substitutions may have occurred due to the inherent limitations of voice recognition software  Read the chart carefully and recognize, using context, where substitutions have occurred

## 2023-05-18 DIAGNOSIS — I87.333 CHRONIC VENOUS HYPERTENSION (IDIOPATHIC) WITH ULCER AND INFLAMMATION OF BILATERAL LOWER EXTREMITY (HCC): ICD-10-CM

## 2023-05-18 DIAGNOSIS — L97.929 CHRONIC VENOUS HYPERTENSION (IDIOPATHIC) WITH ULCER AND INFLAMMATION OF BILATERAL LOWER EXTREMITY (HCC): ICD-10-CM

## 2023-05-18 DIAGNOSIS — L97.922 NON-PRESSURE CHRONIC ULCER OF LEFT LOWER LEG WITH FAT LAYER EXPOSED (HCC): ICD-10-CM

## 2023-05-18 DIAGNOSIS — L97.919 CHRONIC VENOUS HYPERTENSION (IDIOPATHIC) WITH ULCER AND INFLAMMATION OF BILATERAL LOWER EXTREMITY (HCC): ICD-10-CM

## 2023-05-22 RX ORDER — SODIUM HYPOCHLORITE 2.5 MG/ML
SOLUTION TOPICAL
Qty: 473 ML | Refills: 2 | Status: SHIPPED | OUTPATIENT
Start: 2023-05-22

## 2023-05-30 ENCOUNTER — TELEPHONE (OUTPATIENT)
Dept: FAMILY MEDICINE CLINIC | Facility: CLINIC | Age: 78
End: 2023-05-30

## 2023-05-30 NOTE — TELEPHONE ENCOUNTER
Called patient to let her kknow Gabapentin was refused and to schedule an appt  [facility] notified her it was ready to be picked up

## 2023-11-22 ENCOUNTER — TELEPHONE (OUTPATIENT)
Dept: FAMILY MEDICINE CLINIC | Facility: CLINIC | Age: 78
End: 2023-11-22

## 2023-11-22 DIAGNOSIS — E78.2 COMBINED HYPERLIPIDEMIA: Primary | ICD-10-CM

## 2023-11-22 RX ORDER — ATORVASTATIN CALCIUM 20 MG/1
20 TABLET, FILM COATED ORAL DAILY
Qty: 30 TABLET | Refills: 0 | Status: SHIPPED | OUTPATIENT
Start: 2023-11-22

## 2023-11-22 RX ORDER — ATORVASTATIN CALCIUM 20 MG/1
20 TABLET, FILM COATED ORAL DAILY
COMMUNITY
End: 2023-11-22 | Stop reason: SDUPTHER

## 2023-11-22 NOTE — TELEPHONE ENCOUNTER
I have not seen her, she is on Dr. Nhi Weeks schedule and she has been seen at [phone]. Do we have any nursing home discharge medications?

## 2023-11-22 NOTE — TELEPHONE ENCOUNTER
Spoke w 3 Neftali Ames rescheduled her grandmother's f/u appt from 11/22/2023 for 12/13/2023 due to transportation conflict. Also pt req refill of atorvastatin 20 mg - rx'd when pt got out of a nursing home - pt out of meds - not on active meds list - Sig: i po qhs: Your instructions are to take one pill, by mouth, at bedtime.         Please advise

## 2023-11-22 NOTE — TELEPHONE ENCOUNTER
We do have a scanned document in pt's chart dated 10/24/23. This appears to be the last note from [facility]. On pt's med list in that note Atorvastatin 20MG 1 PO QHS is listed.

## 2023-11-22 NOTE — TELEPHONE ENCOUNTER
Gloria Slaughter called - wanted to let us know that pt is also out of sodium  bicarbonate 650 mg - something what she usually gets OTC. Will do so this time. Gloria Slaughter notified - atorvastatin has been sent. Phone # in pts chart / Donna Hollingsworth info updated.    E-Prescribing Status: Receipt confirmed by pharmacy (11/22/2023 10:15 AM EST)

## 2024-03-12 ENCOUNTER — TELEPHONE (OUTPATIENT)
Dept: FAMILY MEDICINE CLINIC | Facility: CLINIC | Age: 79
End: 2024-03-12

## 2024-03-12 NOTE — TELEPHONE ENCOUNTER
She was admitted to [facility] for a massive toe infection so she had to reschedule her appointment with Dr. Rojas. Can we refill her gabapentin and atorvastatin in the meantime? She is scheduled for 4/3/24.

## 2024-04-03 ENCOUNTER — TELEPHONE (OUTPATIENT)
Dept: FAMILY MEDICINE CLINIC | Facility: CLINIC | Age: 79
End: 2024-04-03

## 2024-04-03 NOTE — TELEPHONE ENCOUNTER
Patient has cancelled, no-showed and rescheduled many times.  Per Juwan, patient was called and a VM was left regarding the issue.  Patient can't be rescheduled again.

## 2024-06-14 NOTE — ASSESSMENT & PLAN NOTE
Gabapentin ordered, education provided 
Intake completed  Labs ordered  Referral made to vascular surgery and Wound Care  Stressed the importance of managing wound so it does not deteriorate 
Patient has more than right leg  Reports that it hurts a lot  Gabapentin ordered to help with that and also help with post herpetic neurolagia    Tramadol at night
[facility]

## 2024-07-26 ENCOUNTER — TELEPHONE (OUTPATIENT)
Dept: FAMILY MEDICINE CLINIC | Facility: CLINIC | Age: 79
End: 2024-07-26

## 2024-07-26 NOTE — TELEPHONE ENCOUNTER
Pts chart reviewed - disregard this request.     Request for OT starting on 6/9/24.    Pt is no longer under our care, with this note docs faxed back to # [phone], confirmation fax received.   fax put into shredder.   Please disregard additional fax requests

## 2024-09-18 ENCOUNTER — TELEPHONE (OUTPATIENT)
Age: 79
End: 2024-09-18

## 2024-09-18 NOTE — TELEPHONE ENCOUNTER
Alfred from [facility] called in to update PCP that patient has drainage from wound on left lower extremity. [facility] is requesting antibiotics be sent in for patient.